# Patient Record
Sex: FEMALE | Race: BLACK OR AFRICAN AMERICAN | NOT HISPANIC OR LATINO | Employment: FULL TIME | URBAN - METROPOLITAN AREA
[De-identification: names, ages, dates, MRNs, and addresses within clinical notes are randomized per-mention and may not be internally consistent; named-entity substitution may affect disease eponyms.]

---

## 2019-12-13 ENCOUNTER — HISTORICAL (OUTPATIENT)
Dept: FAMILY MEDICINE | Facility: CLINIC | Age: 49
End: 2019-12-13

## 2019-12-13 LAB
ABS NEUT (OLG): 2.95 X10(3)/MCL (ref 2.1–9.2)
ALBUMIN SERPL-MCNC: 3.5 GM/DL (ref 3.4–5)
ALBUMIN/GLOB SERPL: 1 RATIO (ref 1.1–2)
ALP SERPL-CCNC: 78 UNIT/L (ref 45–117)
ALT SERPL-CCNC: 17 UNIT/L (ref 12–78)
APPEARANCE, UA: ABNORMAL
AST SERPL-CCNC: 11 UNIT/L (ref 15–37)
BACTERIA #/AREA URNS AUTO: ABNORMAL /HPF
BASOPHILS # BLD AUTO: 0 X10(3)/MCL (ref 0–0.2)
BASOPHILS NFR BLD AUTO: 1 %
BILIRUB SERPL-MCNC: 0.4 MG/DL (ref 0.2–1)
BILIRUB UR QL STRIP: NEGATIVE
BILIRUBIN DIRECT+TOT PNL SERPL-MCNC: <0.1 MG/DL (ref 0–0.2)
BILIRUBIN DIRECT+TOT PNL SERPL-MCNC: >0.3 MG/DL
BUN SERPL-MCNC: 43 MG/DL (ref 7–18)
CALCIUM SERPL-MCNC: 8.5 MG/DL (ref 8.5–10.1)
CHLORIDE SERPL-SCNC: 106 MMOL/L (ref 98–107)
CHOLEST SERPL-MCNC: 150 MG/DL
CHOLEST/HDLC SERPL: 2.8 {RATIO} (ref 0–4.4)
CO2 SERPL-SCNC: 30 MMOL/L (ref 21–32)
COLOR UR: ABNORMAL
CREAT SERPL-MCNC: 1.9 MG/DL (ref 0.6–1.3)
EOSINOPHIL # BLD AUTO: 0.3 X10(3)/MCL (ref 0–0.9)
EOSINOPHIL NFR BLD AUTO: 6 %
ERYTHROCYTE [DISTWIDTH] IN BLOOD BY AUTOMATED COUNT: 13 % (ref 11.5–14.5)
EST. AVERAGE GLUCOSE BLD GHB EST-MCNC: 120 MG/DL
GLOBULIN SER-MCNC: 3.5 GM/ML (ref 2.3–3.5)
GLUCOSE (UA): NEGATIVE
GLUCOSE SERPL-MCNC: 84 MG/DL (ref 74–106)
HBA1C MFR BLD: 5.8 % (ref 4.2–6.3)
HCT VFR BLD AUTO: 42 % (ref 35–46)
HDLC SERPL-MCNC: 53 MG/DL (ref 40–59)
HGB BLD-MCNC: 13 GM/DL (ref 12–16)
HGB UR QL STRIP: NEGATIVE
HYALINE CASTS #/AREA URNS LPF: ABNORMAL /LPF
IMM GRANULOCYTES # BLD AUTO: 0.02 10*3/UL
IMM GRANULOCYTES NFR BLD AUTO: 0 %
KETONES UR QL STRIP: NEGATIVE
LDLC SERPL CALC-MCNC: 77 MG/DL
LEUKOCYTE ESTERASE UR QL STRIP: 25 LEU/UL
LYMPHOCYTES # BLD AUTO: 0.9 X10(3)/MCL (ref 0.6–4.6)
LYMPHOCYTES NFR BLD AUTO: 18 %
MCH RBC QN AUTO: 26.9 PG (ref 26–34)
MCHC RBC AUTO-ENTMCNC: 31 GM/DL (ref 31–37)
MCV RBC AUTO: 86.8 FL (ref 80–100)
MONOCYTES # BLD AUTO: 0.6 X10(3)/MCL (ref 0.1–1.3)
MONOCYTES NFR BLD AUTO: 12 %
NEUTROPHILS # BLD AUTO: 2.95 X10(3)/MCL (ref 2.1–9.2)
NEUTROPHILS NFR BLD AUTO: 62 %
NITRITE UR QL STRIP: NEGATIVE
PH UR STRIP: 6.5 [PH] (ref 4.5–8)
PLATELET # BLD AUTO: 265 X10(3)/MCL (ref 130–400)
PMV BLD AUTO: 10.2 FL (ref 7.4–10.4)
POTASSIUM SERPL-SCNC: 4 MMOL/L (ref 3.5–5.1)
PROT SERPL-MCNC: 7 GM/DL (ref 6.4–8.2)
PROT UR QL STRIP: 30 MG/DL
RBC # BLD AUTO: 4.84 X10(6)/MCL (ref 4–5.2)
RBC #/AREA URNS AUTO: ABNORMAL /HPF
SODIUM SERPL-SCNC: 140 MMOL/L (ref 136–145)
SP GR UR STRIP: 1.01 (ref 1–1.03)
SQUAMOUS #/AREA URNS LPF: >100 /LPF
T4 FREE SERPL-MCNC: 1.13 NG/DL (ref 0.76–1.46)
TRIGL SERPL-MCNC: 99 MG/DL
TSH SERPL-ACNC: 2.73 MIU/L (ref 0.36–3.74)
UROBILINOGEN UR STRIP-ACNC: NORMAL
VLDLC SERPL CALC-MCNC: 20 MG/DL
WBC # SPEC AUTO: 4.8 X10(3)/MCL (ref 4.5–11)
WBC #/AREA URNS AUTO: ABNORMAL /HPF

## 2020-01-14 ENCOUNTER — HISTORICAL (OUTPATIENT)
Dept: RADIOLOGY | Facility: HOSPITAL | Age: 50
End: 2020-01-14

## 2020-01-14 LAB
ALBUMIN SERPL-MCNC: 3.6 GM/DL (ref 3.4–5)
ALBUMIN/GLOB SERPL: 1 RATIO (ref 1.1–2)
ALP SERPL-CCNC: 100 UNIT/L (ref 45–117)
ALT SERPL-CCNC: 16 UNIT/L (ref 12–78)
APPEARANCE, UA: CLEAR
AST SERPL-CCNC: 12 UNIT/L (ref 15–37)
BACTERIA #/AREA URNS AUTO: ABNORMAL /HPF
BILIRUB SERPL-MCNC: 0.3 MG/DL (ref 0.2–1)
BILIRUB UR QL STRIP: NEGATIVE
BILIRUBIN DIRECT+TOT PNL SERPL-MCNC: <0.1 MG/DL (ref 0–0.2)
BILIRUBIN DIRECT+TOT PNL SERPL-MCNC: ABNORMAL MG/DL
BUN SERPL-MCNC: 31 MG/DL (ref 7–18)
CALCIUM SERPL-MCNC: 8.8 MG/DL (ref 8.5–10.1)
CHLORIDE SERPL-SCNC: 106 MMOL/L (ref 98–107)
CO2 SERPL-SCNC: 31 MMOL/L (ref 21–32)
COLOR UR: ABNORMAL
CREAT SERPL-MCNC: 1.7 MG/DL (ref 0.6–1.3)
CREAT UR-MCNC: 80 MG/DL
GLOBULIN SER-MCNC: 3.6 GM/ML (ref 2.3–3.5)
GLUCOSE (UA): NEGATIVE
GLUCOSE SERPL-MCNC: 82 MG/DL (ref 74–106)
HGB UR QL STRIP: 0.03 MG/DL
HYALINE CASTS #/AREA URNS LPF: ABNORMAL /LPF
KETONES UR QL STRIP: NEGATIVE
LEUKOCYTE ESTERASE UR QL STRIP: NEGATIVE
NITRITE UR QL STRIP: NEGATIVE
PH UR STRIP: 5.5 [PH] (ref 4.5–8)
POTASSIUM SERPL-SCNC: 4.6 MMOL/L (ref 3.5–5.1)
PROT SERPL-MCNC: 7.2 GM/DL (ref 6.4–8.2)
PROT UR QL STRIP: 70 MG/DL
PROT UR STRIP-MCNC: 74.7 MG/DL
PROT/CREAT UR-RTO: 933.8 MG/GM
RBC #/AREA URNS AUTO: ABNORMAL /HPF
SODIUM SERPL-SCNC: 139 MMOL/L (ref 136–145)
SP GR UR STRIP: 1.01 (ref 1–1.03)
SQUAMOUS #/AREA URNS LPF: ABNORMAL /LPF
URATE SERPL-MCNC: 7.6 MG/DL (ref 2.6–6)
UROBILINOGEN UR STRIP-ACNC: NORMAL
WBC #/AREA URNS AUTO: ABNORMAL /HPF

## 2020-03-03 ENCOUNTER — HISTORICAL (OUTPATIENT)
Dept: RADIOLOGY | Facility: HOSPITAL | Age: 50
End: 2020-03-03

## 2020-06-04 ENCOUNTER — HISTORICAL (OUTPATIENT)
Dept: LAB | Facility: HOSPITAL | Age: 50
End: 2020-06-04

## 2020-06-04 LAB
ABS NEUT (OLG): 2.69 X10(3)/MCL (ref 2.1–9.2)
ALBUMIN SERPL-MCNC: 3.8 GM/DL (ref 3.4–5)
ALBUMIN/GLOB SERPL: 1 RATIO (ref 1.1–2)
ALP SERPL-CCNC: 99 UNIT/L (ref 45–117)
ALT SERPL-CCNC: 17 UNIT/L (ref 12–78)
APPEARANCE, UA: CLEAR
AST SERPL-CCNC: 13 UNIT/L (ref 15–37)
BACTERIA #/AREA URNS AUTO: ABNORMAL /HPF
BASOPHILS # BLD AUTO: 0 X10(3)/MCL (ref 0–0.2)
BASOPHILS NFR BLD AUTO: 1 %
BILIRUB SERPL-MCNC: 0.3 MG/DL (ref 0.2–1)
BILIRUB UR QL STRIP: NEGATIVE
BILIRUBIN DIRECT+TOT PNL SERPL-MCNC: <0.1 MG/DL (ref 0–0.2)
BILIRUBIN DIRECT+TOT PNL SERPL-MCNC: ABNORMAL MG/DL
BUN SERPL-MCNC: 32 MG/DL (ref 7–18)
CALCIUM SERPL-MCNC: 9.2 MG/DL (ref 8.5–10.1)
CHLORIDE SERPL-SCNC: 110 MMOL/L (ref 98–107)
CHOLEST SERPL-MCNC: 200 MG/DL
CHOLEST/HDLC SERPL: 3.7 {RATIO} (ref 0–4.4)
CO2 SERPL-SCNC: 28 MMOL/L (ref 21–32)
COLOR UR: ABNORMAL
CREAT SERPL-MCNC: 1.7 MG/DL (ref 0.6–1.3)
CREAT UR-MCNC: 96 MG/DL
EOSINOPHIL # BLD AUTO: 0.2 X10(3)/MCL (ref 0–0.9)
EOSINOPHIL NFR BLD AUTO: 5 %
ERYTHROCYTE [DISTWIDTH] IN BLOOD BY AUTOMATED COUNT: 13 % (ref 11.5–14.5)
EST. AVERAGE GLUCOSE BLD GHB EST-MCNC: 100 MG/DL
GLOBULIN SER-MCNC: 4 GM/ML (ref 2.3–3.5)
GLUCOSE (UA): NEGATIVE
GLUCOSE SERPL-MCNC: 78 MG/DL (ref 74–106)
HAV IGM SERPL QL IA: NONREACTIVE
HBA1C MFR BLD: 5.1 % (ref 4.2–6.3)
HBV CORE IGM SERPL QL IA: NONREACTIVE
HBV SURFACE AG SERPL QL IA: NONREACTIVE
HCT VFR BLD AUTO: 44.2 % (ref 35–46)
HCV AB SERPL QL IA: NONREACTIVE
HDLC SERPL-MCNC: 54 MG/DL (ref 40–59)
HGB BLD-MCNC: 13.8 GM/DL (ref 12–16)
HGB UR QL STRIP: 0.06 MG/DL
HIV 1+2 AB+HIV1 P24 AG SERPL QL IA: NONREACTIVE
HYALINE CASTS #/AREA URNS LPF: ABNORMAL /LPF
IMM GRANULOCYTES # BLD AUTO: 0.01 10*3/UL
IMM GRANULOCYTES NFR BLD AUTO: 0 %
KETONES UR QL STRIP: NEGATIVE
LDLC SERPL CALC-MCNC: 113 MG/DL
LEUKOCYTE ESTERASE UR QL STRIP: NEGATIVE
LYMPHOCYTES # BLD AUTO: 1.1 X10(3)/MCL (ref 0.6–4.6)
LYMPHOCYTES NFR BLD AUTO: 24 %
MCH RBC QN AUTO: 27.1 PG (ref 26–34)
MCHC RBC AUTO-ENTMCNC: 31.2 GM/DL (ref 31–37)
MCV RBC AUTO: 86.7 FL (ref 80–100)
MONOCYTES # BLD AUTO: 0.4 X10(3)/MCL (ref 0.1–1.3)
MONOCYTES NFR BLD AUTO: 9 %
NEUTROPHILS # BLD AUTO: 2.69 X10(3)/MCL (ref 2.1–9.2)
NEUTROPHILS NFR BLD AUTO: 60 %
NITRITE UR QL STRIP: NEGATIVE
PH UR STRIP: 6 [PH] (ref 4.5–8)
PLATELET # BLD AUTO: 287 X10(3)/MCL (ref 130–400)
PMV BLD AUTO: 11.1 FL (ref 7.4–10.4)
POTASSIUM SERPL-SCNC: 4.4 MMOL/L (ref 3.5–5.1)
PROT SERPL-MCNC: 7.8 GM/DL (ref 6.4–8.2)
PROT UR QL STRIP: 200 MG/DL
PROT UR STRIP-MCNC: 185.2 MG/DL
PROT/CREAT UR-RTO: 1929.2 MG/GM
RBC # BLD AUTO: 5.1 X10(6)/MCL (ref 4–5.2)
RBC #/AREA URNS AUTO: ABNORMAL /HPF
SERINE PROT 3-ARUP: 6 AU/ML
SODIUM SERPL-SCNC: 142 MMOL/L (ref 136–145)
SP GR UR STRIP: 1.01 (ref 1–1.03)
SQUAMOUS #/AREA URNS LPF: ABNORMAL /LPF
TRIGL SERPL-MCNC: 164 MG/DL
URATE SERPL-MCNC: 6.6 MG/DL (ref 2.6–6)
UROBILINOGEN UR STRIP-ACNC: NORMAL
VLDLC SERPL CALC-MCNC: 33 MG/DL
WBC # SPEC AUTO: 4.4 X10(3)/MCL (ref 4.5–11)
WBC #/AREA URNS AUTO: ABNORMAL /HPF

## 2020-06-07 LAB — FINAL CULTURE: NORMAL

## 2020-08-06 ENCOUNTER — HISTORICAL (OUTPATIENT)
Dept: ADMINISTRATIVE | Facility: HOSPITAL | Age: 50
End: 2020-08-06

## 2020-08-06 LAB
ABS NEUT (OLG): 2.15 X10(3)/MCL (ref 2.1–9.2)
ALBUMIN SERPL-MCNC: 3.9 GM/DL (ref 3.4–5)
ALBUMIN/GLOB SERPL: 1 RATIO (ref 1.1–2)
ALP SERPL-CCNC: 96 UNIT/L (ref 45–117)
ALT SERPL-CCNC: 21 UNIT/L (ref 12–78)
AST SERPL-CCNC: 18 UNIT/L (ref 15–37)
BASOPHILS # BLD AUTO: 0 X10(3)/MCL (ref 0–0.2)
BASOPHILS NFR BLD AUTO: 1 %
BILIRUB SERPL-MCNC: 0.4 MG/DL (ref 0.2–1)
BILIRUBIN DIRECT+TOT PNL SERPL-MCNC: 0.1 MG/DL (ref 0–0.2)
BILIRUBIN DIRECT+TOT PNL SERPL-MCNC: 0.3 MG/DL
BUN SERPL-MCNC: 41 MG/DL (ref 7–18)
CALCIUM SERPL-MCNC: 9.4 MG/DL (ref 8.5–10.1)
CHLORIDE SERPL-SCNC: 111 MMOL/L (ref 98–107)
CO2 SERPL-SCNC: 27 MMOL/L (ref 21–32)
CREAT SERPL-MCNC: 2.1 MG/DL (ref 0.6–1.3)
CRP SERPL-MCNC: <0.3 MG/DL
EOSINOPHIL # BLD AUTO: 0.2 X10(3)/MCL (ref 0–0.9)
EOSINOPHIL NFR BLD AUTO: 6 %
ERYTHROCYTE [DISTWIDTH] IN BLOOD BY AUTOMATED COUNT: 13.1 % (ref 11.5–14.5)
ERYTHROCYTE [SEDIMENTATION RATE] IN BLOOD: 7 MM/HR (ref 0–20)
GLOBULIN SER-MCNC: 3.9 GM/ML (ref 2.3–3.5)
GLUCOSE SERPL-MCNC: 85 MG/DL (ref 74–106)
HCT VFR BLD AUTO: 43.7 % (ref 35–46)
HGB BLD-MCNC: 13.7 GM/DL (ref 12–16)
IMM GRANULOCYTES # BLD AUTO: 0.01 10*3/UL
IMM GRANULOCYTES NFR BLD AUTO: 0 %
LYMPHOCYTES # BLD AUTO: 1.1 X10(3)/MCL (ref 0.6–4.6)
LYMPHOCYTES NFR BLD AUTO: 28 %
MCH RBC QN AUTO: 27.2 PG (ref 26–34)
MCHC RBC AUTO-ENTMCNC: 31.4 GM/DL (ref 31–37)
MCV RBC AUTO: 86.7 FL (ref 80–100)
MONOCYTES # BLD AUTO: 0.3 X10(3)/MCL (ref 0.1–1.3)
MONOCYTES NFR BLD AUTO: 8 %
NEUTROPHILS # BLD AUTO: 2.15 X10(3)/MCL (ref 2.1–9.2)
NEUTROPHILS NFR BLD AUTO: 56 %
PLATELET # BLD AUTO: 244 X10(3)/MCL (ref 130–400)
PMV BLD AUTO: 10.7 FL (ref 7.4–10.4)
POTASSIUM SERPL-SCNC: 4.9 MMOL/L (ref 3.5–5.1)
PROT SERPL-MCNC: 7.8 GM/DL (ref 6.4–8.2)
RBC # BLD AUTO: 5.04 X10(6)/MCL (ref 4–5.2)
SODIUM SERPL-SCNC: 142 MMOL/L (ref 136–145)
TSH SERPL-ACNC: 3.53 MIU/L (ref 0.36–3.74)
URATE SERPL-MCNC: 5.3 MG/DL (ref 2.6–6)
WBC # SPEC AUTO: 3.8 X10(3)/MCL (ref 4.5–11)

## 2020-08-12 ENCOUNTER — HISTORICAL (OUTPATIENT)
Dept: RADIOLOGY | Facility: HOSPITAL | Age: 50
End: 2020-08-12

## 2020-08-25 ENCOUNTER — HISTORICAL (OUTPATIENT)
Dept: ADMINISTRATIVE | Facility: HOSPITAL | Age: 50
End: 2020-08-25

## 2020-08-25 LAB
ALBUMIN SERPL-MCNC: 4.1 GM/DL (ref 3.4–5)
ALBUMIN/GLOB SERPL: 1.1 RATIO (ref 1.1–2)
ALP SERPL-CCNC: 105 UNIT/L (ref 45–117)
ALT SERPL-CCNC: 20 UNIT/L (ref 12–78)
AST SERPL-CCNC: 16 UNIT/L (ref 15–37)
BILIRUB SERPL-MCNC: 0.4 MG/DL (ref 0.2–1)
BILIRUBIN DIRECT+TOT PNL SERPL-MCNC: 0.2 MG/DL
BILIRUBIN DIRECT+TOT PNL SERPL-MCNC: 0.2 MG/DL (ref 0–0.2)
BUN SERPL-MCNC: 24 MG/DL (ref 7–18)
CALCIUM SERPL-MCNC: 9.5 MG/DL (ref 8.5–10.1)
CHLORIDE SERPL-SCNC: 109 MMOL/L (ref 98–107)
CO2 SERPL-SCNC: 28 MMOL/L (ref 21–32)
CREAT SERPL-MCNC: 1.8 MG/DL (ref 0.6–1.3)
GLOBULIN SER-MCNC: 3.9 GM/ML (ref 2.3–3.5)
GLUCOSE SERPL-MCNC: 83 MG/DL (ref 74–106)
POTASSIUM SERPL-SCNC: 4.5 MMOL/L (ref 3.5–5.1)
PROT SERPL-MCNC: 8 GM/DL (ref 6.4–8.2)
SODIUM SERPL-SCNC: 143 MMOL/L (ref 136–145)
URATE SERPL-MCNC: 7.3 MG/DL (ref 2.6–6)

## 2020-10-19 ENCOUNTER — HISTORICAL (OUTPATIENT)
Dept: FAMILY MEDICINE | Facility: CLINIC | Age: 50
End: 2020-10-19

## 2020-10-19 LAB
CHOLEST SERPL-MCNC: 160 MG/DL
CHOLEST/HDLC SERPL: 3.2 {RATIO} (ref 0–4.4)
HDLC SERPL-MCNC: 50 MG/DL (ref 40–59)
LDLC SERPL CALC-MCNC: 87 MG/DL
TRIGL SERPL-MCNC: 117 MG/DL
VLDLC SERPL CALC-MCNC: 23 MG/DL

## 2020-12-28 ENCOUNTER — HISTORICAL (OUTPATIENT)
Dept: ADMINISTRATIVE | Facility: HOSPITAL | Age: 50
End: 2020-12-28

## 2020-12-28 LAB
ABS NEUT (OLG): 1.92 X10(3)/MCL (ref 2.1–9.2)
ALBUMIN SERPL-MCNC: 4 GM/DL (ref 3.5–5)
ALBUMIN/GLOB SERPL: 1.2 RATIO (ref 1.1–2)
ALP SERPL-CCNC: 87 UNIT/L (ref 40–150)
ALT SERPL-CCNC: 14 UNIT/L (ref 0–55)
APPEARANCE, UA: CLEAR
AST SERPL-CCNC: 15 UNIT/L (ref 5–34)
BACTERIA #/AREA URNS AUTO: ABNORMAL /HPF
BASOPHILS # BLD AUTO: 0 X10(3)/MCL (ref 0–0.2)
BASOPHILS NFR BLD AUTO: 1 %
BILIRUB SERPL-MCNC: 0.3 MG/DL
BILIRUB UR QL STRIP: NEGATIVE
BILIRUBIN DIRECT+TOT PNL SERPL-MCNC: 0.1 MG/DL (ref 0–0.5)
BILIRUBIN DIRECT+TOT PNL SERPL-MCNC: 0.2 MG/DL (ref 0–0.8)
BUN SERPL-MCNC: 35 MG/DL (ref 9.8–20.1)
CALCIUM SERPL-MCNC: 9.4 MG/DL (ref 8.4–10.2)
CHLORIDE SERPL-SCNC: 106 MMOL/L (ref 98–107)
CO2 SERPL-SCNC: 27 MMOL/L (ref 22–29)
COLOR UR: ABNORMAL
CREAT SERPL-MCNC: 1.89 MG/DL (ref 0.55–1.02)
CREAT UR-MCNC: 103.8 MG/DL (ref 45–106)
DEPRECATED CALCIDIOL+CALCIFEROL SERPL-MC: 54.1 NG/ML (ref 30–80)
EOSINOPHIL # BLD AUTO: 0.2 X10(3)/MCL (ref 0–0.9)
EOSINOPHIL NFR BLD AUTO: 7 %
ERYTHROCYTE [DISTWIDTH] IN BLOOD BY AUTOMATED COUNT: 12.6 % (ref 11.5–14.5)
EST CREAT CLEARANCE SER (OHS): 52.76 ML/MIN
GLOBULIN SER-MCNC: 3.3 GM/DL (ref 2.4–3.5)
GLUCOSE (UA): NEGATIVE
GLUCOSE SERPL-MCNC: 81 MG/DL (ref 74–100)
HCT VFR BLD AUTO: 44.2 % (ref 35–46)
HGB BLD-MCNC: 13.7 GM/DL (ref 12–16)
HGB UR QL STRIP: 0.03 MG/DL
HYALINE CASTS #/AREA URNS LPF: ABNORMAL /LPF
KETONES UR QL STRIP: NEGATIVE
LEUKOCYTE ESTERASE UR QL STRIP: NEGATIVE
LYMPHOCYTES # BLD AUTO: 1 X10(3)/MCL (ref 0.6–4.6)
LYMPHOCYTES NFR BLD AUTO: 28 %
MAGNESIUM SERPL-MCNC: 2.35 MG/DL (ref 1.6–2.6)
MCH RBC QN AUTO: 26.9 PG (ref 26–34)
MCHC RBC AUTO-ENTMCNC: 31 GM/DL (ref 31–37)
MCV RBC AUTO: 86.7 FL (ref 80–100)
MONOCYTES # BLD AUTO: 0.4 X10(3)/MCL (ref 0.1–1.3)
MONOCYTES NFR BLD AUTO: 12 %
NEUTROPHILS # BLD AUTO: 1.92 X10(3)/MCL (ref 2.1–9.2)
NEUTROPHILS NFR BLD AUTO: 52 %
NITRITE UR QL STRIP: NEGATIVE
PH UR STRIP: 5.5 [PH] (ref 4.5–8)
PHOSPHATE SERPL-MCNC: 2.9 MG/DL (ref 2.3–4.7)
PLATELET # BLD AUTO: 297 X10(3)/MCL (ref 130–400)
PMV BLD AUTO: 11.4 FL (ref 7.4–10.4)
POTASSIUM SERPL-SCNC: 4 MMOL/L (ref 3.5–5.1)
PROT SERPL-MCNC: 7.3 GM/DL (ref 6.4–8.3)
PROT UR QL STRIP: 100 MG/DL
PROT UR STRIP-MCNC: 96.9 MG/DL
PROT/CREAT UR-RTO: 933.5 MG/GM
PTH-INTACT SERPL-MCNC: 117.1 PG/ML (ref 18.4–80.1)
RBC # BLD AUTO: 5.1 X10(6)/MCL (ref 4–5.2)
RBC #/AREA URNS AUTO: ABNORMAL /HPF
SODIUM SERPL-SCNC: 143 MMOL/L (ref 136–145)
SP GR UR STRIP: 1.01 (ref 1–1.03)
SQUAMOUS #/AREA URNS LPF: ABNORMAL /LPF
URATE SERPL-MCNC: 9.5 MG/DL (ref 2.6–6)
UROBILINOGEN UR STRIP-ACNC: NORMAL
WBC # SPEC AUTO: 3.7 X10(3)/MCL (ref 4.5–11)
WBC #/AREA URNS AUTO: ABNORMAL /HPF

## 2021-06-16 ENCOUNTER — HISTORICAL (OUTPATIENT)
Dept: FAMILY MEDICINE | Facility: CLINIC | Age: 51
End: 2021-06-16

## 2021-06-16 LAB
ABS NEUT (OLG): 2.75 X10(3)/MCL (ref 2.1–9.2)
ALBUMIN SERPL-MCNC: 3.9 GM/DL (ref 3.5–5)
ALBUMIN/GLOB SERPL: 1.3 RATIO (ref 1.1–2)
ALP SERPL-CCNC: 101 UNIT/L (ref 40–150)
ALT SERPL-CCNC: 10 UNIT/L (ref 0–55)
APPEARANCE, UA: CLEAR
AST SERPL-CCNC: 16 UNIT/L (ref 5–34)
BACTERIA #/AREA URNS AUTO: ABNORMAL /HPF
BASOPHILS # BLD AUTO: 0 X10(3)/MCL (ref 0–0.2)
BASOPHILS NFR BLD AUTO: 1 %
BILIRUB SERPL-MCNC: 0.3 MG/DL
BILIRUB UR QL STRIP: NEGATIVE
BILIRUBIN DIRECT+TOT PNL SERPL-MCNC: 0.1 MG/DL (ref 0–0.5)
BILIRUBIN DIRECT+TOT PNL SERPL-MCNC: 0.2 MG/DL (ref 0–0.8)
BUN SERPL-MCNC: 27.4 MG/DL (ref 9.8–20.1)
CALCIUM SERPL-MCNC: 9.6 MG/DL (ref 8.4–10.2)
CHLORIDE SERPL-SCNC: 111 MMOL/L (ref 98–107)
CHOLEST SERPL-MCNC: 149 MG/DL
CHOLEST/HDLC SERPL: 4 {RATIO} (ref 0–5)
CO2 SERPL-SCNC: 26 MMOL/L (ref 22–29)
COLOR UR: ABNORMAL
CREAT SERPL-MCNC: 1.61 MG/DL (ref 0.55–1.02)
CREAT UR-MCNC: 65.7 MG/DL (ref 45–106)
EOSINOPHIL # BLD AUTO: 0.4 X10(3)/MCL (ref 0–0.9)
EOSINOPHIL NFR BLD AUTO: 8 %
ERYTHROCYTE [DISTWIDTH] IN BLOOD BY AUTOMATED COUNT: 14 % (ref 11.5–14.5)
GLOBULIN SER-MCNC: 3 GM/DL (ref 2.4–3.5)
GLUCOSE (UA): NEGATIVE
GLUCOSE SERPL-MCNC: 88 MG/DL (ref 74–100)
HCT VFR BLD AUTO: 41.2 % (ref 35–46)
HDLC SERPL-MCNC: 34 MG/DL (ref 35–60)
HGB BLD-MCNC: 12.9 GM/DL (ref 12–16)
HGB UR QL STRIP: NEGATIVE
HYALINE CASTS #/AREA URNS LPF: ABNORMAL /LPF
IMM GRANULOCYTES # BLD AUTO: 0.01 10*3/UL
IMM GRANULOCYTES NFR BLD AUTO: 0 %
KETONES UR QL STRIP: NEGATIVE
LDLC SERPL CALC-MCNC: 93 MG/DL (ref 50–140)
LEUKOCYTE ESTERASE UR QL STRIP: NEGATIVE
LYMPHOCYTES # BLD AUTO: 1 X10(3)/MCL (ref 0.6–4.6)
LYMPHOCYTES NFR BLD AUTO: 22 %
MCH RBC QN AUTO: 27.3 PG (ref 26–34)
MCHC RBC AUTO-ENTMCNC: 31.3 GM/DL (ref 31–37)
MCV RBC AUTO: 87.1 FL (ref 80–100)
MONOCYTES # BLD AUTO: 0.4 X10(3)/MCL (ref 0.1–1.3)
MONOCYTES NFR BLD AUTO: 10 %
NEUTROPHILS # BLD AUTO: 2.75 X10(3)/MCL (ref 2.1–9.2)
NEUTROPHILS NFR BLD AUTO: 59 %
NITRITE UR QL STRIP: NEGATIVE
NRBC BLD AUTO-RTO: 0 % (ref 0–0.2)
PH UR STRIP: 5.5 [PH] (ref 4.5–8)
PLATELET # BLD AUTO: 254 X10(3)/MCL (ref 130–400)
PMV BLD AUTO: 11.3 FL (ref 7.4–10.4)
POTASSIUM SERPL-SCNC: 4.4 MMOL/L (ref 3.5–5.1)
PROT SERPL-MCNC: 6.9 GM/DL (ref 6.4–8.3)
PROT UR QL STRIP: 30 MG/DL
PROT UR STRIP-MCNC: 49 MG/DL
PROT/CREAT UR-RTO: 745.8 MG/GM CR
RBC # BLD AUTO: 4.73 X10(6)/MCL (ref 4–5.2)
RBC #/AREA URNS AUTO: ABNORMAL /HPF
SODIUM SERPL-SCNC: 142 MMOL/L (ref 136–145)
SP GR UR STRIP: 1.01 (ref 1–1.03)
SQUAMOUS #/AREA URNS LPF: ABNORMAL /LPF
TRIGL SERPL-MCNC: 111 MG/DL (ref 37–140)
TSH SERPL-ACNC: 2.53 UIU/ML (ref 0.35–4.94)
UROBILINOGEN UR STRIP-ACNC: NORMAL
VLDLC SERPL CALC-MCNC: 22 MG/DL
WBC # SPEC AUTO: 4.6 X10(3)/MCL (ref 4.5–11)
WBC #/AREA URNS AUTO: ABNORMAL /HPF

## 2021-12-15 ENCOUNTER — HISTORICAL (OUTPATIENT)
Dept: RADIOLOGY | Facility: HOSPITAL | Age: 51
End: 2021-12-15

## 2022-04-10 ENCOUNTER — HISTORICAL (OUTPATIENT)
Dept: ADMINISTRATIVE | Facility: HOSPITAL | Age: 52
End: 2022-04-10
Payer: COMMERCIAL

## 2022-04-26 VITALS
BODY MASS INDEX: 29.98 KG/M2 | HEIGHT: 69 IN | SYSTOLIC BLOOD PRESSURE: 145 MMHG | OXYGEN SATURATION: 99 % | WEIGHT: 202.38 LBS | DIASTOLIC BLOOD PRESSURE: 88 MMHG

## 2022-05-03 NOTE — HISTORICAL [facility] CERNER
This is a historical note converted from Louise. Formatting and pictures may have been removed.  Please reference Louise for original formatting and attached multimedia. Chief Complaint  HTN, CKD, MIN. PLANTAR FASCIITIS, MUSCLE STRAIN  History of Present Illness  Pt is a 50?year old Female?with a? past medical history of HTN, CKD 3, ?new onset cardiac murmur (12/12/19), and LBBB. Pt state plantar fascitis has improved with exercises, ice therapy, medrol dose pack?and Tylenol and hand pain has also improved with pain level 3/10. Pt states she has little tingling in the left shoulder scapular area but overall much better. Discussed labs from 8/6/2020 with the patient. ?Patient has been referred to more physical therapy for plantar fasciitis and hand?pain?and she has been referred to podiatrist?Dr. Victoria?for plantar fasciitis.Pt had MRI today for left sided weakness. MRI results pending. Pt states she no longer has the left sided weakness.  ?   HTN: Adheres to low sodium diet and medications-? diltiazem ER?180 mg po BID and losartan 50 mg po BID? BP is elevated on last visit and today. Renal had tried to discontinue clonidine abruptly in 6/2020 for hydralazine but pt states this was unsuccessful (see CKD3 below). ?Patient states?since?this episode, her blood pressure has?consistently been elevated. CERNER Message sent to MD on 8/24/2020 by [facility] Nephrology NP Bhavna stating she is starting the patient on amlodipine 5 mg and? furosemide 20 mg po daily with the discontinuation of triamterene which was stolen along with all of patients other medications and is expensive.? Pt has not taken either of the medications nor has she taken the triamterene.?She is tapering off clonidine 0.1 mg taking 1/2 tab po BID and hydralazine 10 mg po BID on week 2 of clonidine taper. BP ranges  -136/ 72 -84;?  -145/ 76-90 ( outlier  and ), and bedtime? 111-134/ 71-82.??She denies?blurred vision, headache,?chest pain,  shortness of breath, dyspnea on exertion, PND, peripheral edema?or orthopnea. Pt was advised to continue the taper and start furosemide because of peripheral edema until notified by [facility] LPN about the use of amlodipine. Message was sent to [facility] Nephrology NP Bhavna during the visit with options to start furosemide and continue the taper with titration on of hydralazine (if needed). Awaiting a response.  ?  Cardiac murmur: Pt is asymptomatic. ?Reviewed and discussed with patient the?results of her SANDIE?with left ventricular ejection fraction of 50%. ?Referral for [facility]?is in pending acceptance?status.?  ?   CKD 3:??GFR (8/6/2020): 32 with a BUN/creatinine 41/2.0. Reviewed note nephrology note from 6/5/2020: Nephrology tried to discontinue clonidine as patient was not tolerating therapy well and hydralazine 25 mg by mouth twice a day was added to control hypertension because patients chronic kidney disease stage III is due to hypertensive nephropathy.? Patient states this was unsuccessful.? Clonidine had been stopped abruptly per patient and hydralazine added which produced severe headaches and after 3 days of headaches, the clonidine was restarted and hydralazine was discontinued.  ?  ?   Pt starts PT for planar fascitis?on tomorrow. Tylenol prn pain . Pt has purchased new shoes with arch support. She is also doing stretching exercises and Ice bottle exercises to alleviate plantar fascitis.  ?   Patient denies chest pain, shortness of breath,?dyspnea on exertion, palpitations, peripheral edema,?abdominal pain, nausea, vomiting,?diarrhea,?constipation, fatigue, fever, chills,?dysuria,? hematuria, melena,?or hematochezia.  ?  ?  Review of Systems  GENERAL:?Negative for abnormal weight loss or weight gain, fatigue, fever, or chills. Negative except for stated in HPI.  HEENT:??Negative for head trauma,?blurred vision, rhinorrhea, ?nasal congestion, sore throat or hearing deficit. Negative except for stated in  HPI.  CARDIOVASCULAR: Negative for? chest pain, palpitations, VILLASENOR, PND, orthopnea, peripheral edema or syncope. Negative except for stated in HPI.  RESPIRATORY: Negative for SOB, VILLASENOR, cough or?wheezing.? Negative except for stated in HPI.  GASTROINTESTINAL: Negative for abdominal pain, nausea,??vomiting, diarrhea, constipation, heartburn, ?hematochezia? or melena.? Negative except for stated in HPI.  GENITOURINARY: Negative for dysuria, hematuria, urinary frequency, urinary urgency, urinary hesitancy or flank pain. Negative except for stated in HPI.  ENDOCRINE: Negative for fatigue, heat intolerance, cold intolerance, polyuria, polydipsia, or polyphagia.? Negative except for stated in HPI.  PSYCHOLOGICAL: Negative for depression, anxiety, suicidal or homicidal ideations, hallucinations??or?florin.? Negative except for?stated in HPI.  MUSCULOSKELETAL: .? Negative except for?stated in HPI.  INTEGUMENT: Negative for rash?or lesions. Negative except for stated in HPI.  NEUROLOGY: Negative for headaches, dizziness,??vertigo?or gait disturbances. Negative except for stated in HPI.  ?  ?  Physical Exam  Vitals & Measurements  T:?36.7? ?C (Oral)? HR:?71(Peripheral)? RR:?18? BP:?132/84? SpO2:?96%?  HT:?175.00?cm? WT:?93.800?kg? BMI:?30.63?  GENERAL:?Alert and oriented to person, place, time and situation,?NAD  HEENT: NCAT, Pupils equally round and reactive to light accommodation,?normal sclera, ?moist mucous membranes,?oropharynx normal,?normal nasal turbinates/ nares,??TM clear bilaterally, neck?supple,?thyroid normal,?no lymphadenopathy.  CARDIOVASCULAR:?Regular rateand?rhythm,? S1 and S2 normal;?no murmurs, no rubs, or no gallops; no carotid bruit.  RESPIRATORY:??Lungs clear to auscultation bilaterally;?no wheezes,?no rhonchi,?or? no crackles  ABDOMEN: Soft/ Nontender/ Nondistended; Bowel Sounds x4 - normoactive; no abdominal pulsatile mass, no masses, no hernias  EXTREMITY:? No clubbing, no cyanosis and?1+ to 2 +  peripheral edema; Dorsalis pedis and tibial ?pulses 2+  MUSCULOSKELETAL: FROM of Upper and Lower extremities; Strength 5/5 of Upper and Lower extremities  PSYCHOLOGICAL: Good judgement and insight; normal affect and mood.  NEUROLOGICAL: Normal gait and ?normal sensation;?no focal deficits; Cranial Nerves 2 -12 grossly intact  ?  Assessment/Plan  1.?HTN - Hypertension?I10  Chronic issue/ uncontrolled  Goal BP? for renal patient <130/80.? National Goal <140/90.  Continue to?monitor twice a day (AM & PM) BP and bring in log at next visit  Continue low sodium eating habit of ?less than 2 grams/ day/Dash eating habit  Encourage exercise for 30 minutes 5 days/ week (total 150 minutes/week)  Continue clonidine taper plan::  Weeks 1 through 2: Start clonidine 0.1 mg taking half a tab p.o. twice daily and start hydralazine 10 mg p.o. twice daily (first portion of taper started today)  Weeks 3-4: Start clonidine 0.1 mg taking half a tab p.o. on Mondays, Wednesdays, Fridays, Saturdays and increase hydralazine to 25 mg p.o. twice daily (if needed).  Start furosemide 20 mg po daily (per [facility])  -Discontinue Triamterene 50 mg po BID ( was discontinued prior to last visit)  Continue current medication:  - diltiazem ER?180 mg po BID.  -losartan 50 mg po BID  RTC in 2 weeks to monitor BP on taper and? for any side effects of clonidine withdrawal  Pt to call if BP>140/90 consistently for side effects of clonidine withdrawal occur  Patient expressed verbal understanding and agreement with the above.?  Refills given  Refills given- hydralazine, clonidine, losartan, clonidine  Ordered:  cloNIDine, See Instructions, Clonidine 0.1 mg taper: Weeks 1 -2: Take 0.5 tab po BID Weeks 3-4: Take 0.5 tabs po M,W,F, Saturday, # 30 tab(s), 0 Refill(s), Pharmacy: [id number], 175, cm, Height/Length Dosing, 08/25/20 11:30:00 CDT, 93.8, kg, Weight...  diltiazem, 180 mg = 1 cap(s), Oral, BID, # 180 cap(s), 3 Refill(s), Pharmacy:  [id number], 175, cm, Height/Length Dosing, 08/25/20 11:30:00 CDT, 93.8, kg, Weight Dosing, 08/25/20 11:30:00 CDT  losartan, 50 mg = 1 tab(s), Oral, BID, # 180 tab(s), 2 Refill(s), Pharmacy: [facility], 175, cm, Height/Length Dosing, 08/25/20 11:30:00 CDT, 93.8, kg, Weight Dosing, 08/25/20 11:30:00 CDT  ?  2.?Cardiac murmur, unspecified?R01.1  With?left bundle branch block  Chronic issue/ stable  Pt is asymptomatic  Continue BP control (BP <130/80) and ?LDL<100  EKG: Sinus bradycardia with LBBB; 48 BPM 12/26/19  TTE (1/14/2020: LVEF approximately 50%, trace mitral regurgitation, trace aortic regurgitation present, trace tricuspid regurgitation, trace pulmonic regurgitation; normal left and right atrium size; normal right ventricular size with preserved RV function.  Cardiology referral submitted 12/26/2019?and currently in pending status.  ?  3.?Chronic kidney disease (CKD), stage III (moderate)?N18.3  Chronic issue/slightly worsening  CKD is likely hypertensive nephropathy- will attempt better control of HTN  GFR (8/6/2020): 32 with a BUN/creatinine 41/2.0  Follow low sodium diet (less than 2 grams a day)  Control high blood pressure ( goal BP < 130/80, please record BP at home every day and bring log to next office visit)  Exercise at least 30 minutes a day, 5 days a week.  Maintain healthy weight.  Stay well hydrated with water  Do not take NSAIDs (Ibuprofen, Naproxen, Aleve, Advil, Toradol, Mobic), may take only Tylenol as needed for pain/headaches.  Keep?follow up appointment?with?[facility]  ??????  ?  4.?Plantar fasciitis?M72.2  New problem/ improving  Discussed with patient plantar fasciitis, conservative treatment, and medication treatment  Patient has stage?III kidney disease and NSAIDs are contraindicated.  Demonstrated and?discussed plantar?fasciitis exercises  Continue Tylenol 1 g p.o. 3 times daily as needed pain  Ambulatory referral for podiatry and physical therapy(submitted  8/12/2020 for previous visit). PT starts 8/26/2020.  Labs ordered see below  ?   5. Gout- refill given for allopurinol.Chronic issue/ stable  ?  Orders:  allopurinol, 100 mg = 1 tab(s), Oral, Daily, X 90 day(s), # 90 tab(s), 2 Refill(s), Pharmacy: [facility], 175, cm, Height/Length Dosing, 08/25/20 11:30:00 CDT, 93.8, kg, Weight Dosing, 08/25/20 11:30:00 CDT  furosemide, 20 mg = 1 tab(s), Oral, Daily, as needed ofr swelling, # 90 tab(s), 0 Refill(s), Pharmacy: [facility], 175, cm, Height/Length Dosing, 08/25/20 11:30:00 CDT, 93.8, kg, Weight Dosing, 08/25/20 11:30:00 CDT  hydrALAZINE, 10 mg = 1 tab(s), Oral, BID, # 60 tab(s), 1 Refill(s), Pharmacy: [id number], 175, cm, Height/Length Dosing, 08/25/20 11:30:00 CDT, 93.8, kg, Weight Dosing, 08/25/20 11:30:00 CDT  Occupational Therapy Eval and Treat OP, 08/26/20 10:00:00 CDT, ADL, Stop date 08/26/20 10:00:00 CDT   Problem List/Past Medical History  Ongoing  Cardiac murmur, unspecified  Chronic kidney disease (CKD), stage III (moderate)  Essential hypertension  HTN - Hypertension  Obesity  Obesity  Plantar fasciitis  Historical  Pregnant  Pregnant  Procedure/Surgical History  Tonsillotomy (1984)  Bone spur  bone spur  Tubal ligation   Medications  allopurinol 100 mg oral tablet, 100 mg= 1 tab(s), Oral, Daily, 2 refills  amlodipine 5 mg oral tablet, 5 mg= 1 tab(s), Oral, Daily  cloniDINE 0.1 mg oral tablet, See Instructions  diltiazem 180 mg/24 hours oral CAPsule, extended release, 180 mg= 1 cap(s), Oral, BID, 3 refills  furosemide 20 mg oral tablet, 20 mg= 1 tab(s), Oral, Daily  hydrALAZINE 10 mg oral tablet, 10 mg= 1 tab(s), Oral, BID, 1 refills  ketorolac, 60 mg= 2 mL, IM, Once,? ?Not taking  losartan 50 mg oral tablet, 50 mg= 1 tab(s), Oral, BID, 2 refills  traMADol, 50 mg= 1 tab(s), Oral, Once  Allergies  No Known Allergies  Social History  Abuse/Neglect  No, No, Yes, 08/25/2020  Alcohol  Current, Wine, 1-2 times per year, Alcohol use  interferes with work or home: No. Others hurt by drinking: No. Household alcohol concerns: No., 06/05/2020  Employment/School  Employed, 01/31/2020  Exercise  Exercise duration: 0., 08/06/2020  Home/Environment  Lives with Children., 12/12/2019    Never in , 08/06/2020  Nutrition/Health  Regular, Good, 12/12/2019  Sexual  Sexually active: No., 12/12/2019  Spiritual/Cultural  Hoahaoism, 01/31/2020  Substance Use  Never, 06/05/2020  Tobacco  Former smoker, quit more than 30 days ago, No, 08/25/2020  Family History  Hypertension.: Mother and Father.  Renal failure syndrome.: Brother.  Stroke: Mother and Father.  Immunizations  Vaccine Date Status   tetanus/diphtheria/pertussis, acel(Tdap) 12/12/2019 Given   Health Maintenance  Health Maintenance  ???Pending?(in the next year)  ??? ??Due?  ??? ? ? ?Cervical Cancer Screening due??08/25/20??and every?  ??? ? ? ?Colorectal Screening due??08/25/20??and every?  ??? ? ? ?Influenza Vaccine due??08/25/20??and every?  ??? ? ? ?Zoster Vaccine due??08/25/20??and every?  ??? ??Due In Future?  ??? ? ? ?Hypertension Management-Education not due until??12/12/20??and every 1??year(s)  ??? ? ? ?Obesity Screening not due until??01/01/21??and every 1??year(s)  ??? ? ? ?Alcohol Misuse Screening not due until??01/02/21??and every 1??year(s)  ??? ? ? ?Diabetes Screening not due until??08/06/21??and every 1??year(s)  ??? ? ? ?Hypertension Management-BMP not due until??08/06/21??and every 1??year(s)  ???Satisfied?(in the past 1 year)  ??? ??Satisfied?  ??? ? ? ?ADL Screening on??08/25/20.??Satisfied by Heather Byrne LPN  ??? ? ? ?Alcohol Misuse Screening on??08/06/20.??Satisfied by Heather Byrne LPN  ??? ? ? ?Blood Pressure Screening on??08/25/20.??Satisfied by Heather Byrne LPN  ??? ? ? ?Body Mass Index Check on??08/25/20.??Satisfied by Heather Byrne LPN  ??? ? ? ?Breast Cancer Screening on??09/13/19.??Satisfied by Grazyna López  ??? ? ? ?Depression Screening  on??08/25/20.??Satisfied by Heather Byrne LPN  ??? ? ? ?Diabetes Screening on??08/06/20.??Satisfied by Tony Bradley Jr.  ??? ? ? ?Hypertension Management-Blood Pressure on??08/25/20.??Satisfied by Heather Byrne LPN  ??? ? ? ?Lipid Screening on??06/04/20.??Satisfied by Maryana Arechiga  ??? ? ? ?Obesity Screening on??08/25/20.??Satisfied by Heather Byrne LPN  ??? ? ? ?Tetanus Vaccine on??12/12/19.??Satisfied by Maria E Reyes LPN  ??? ??Refused?  ??? ? ? ?Zoster Vaccine on??08/25/20.??Recorded by Heather Byrne LPN??Reason: Patient Refuses  ?

## 2022-05-03 NOTE — HISTORICAL [facility] CERNER
This is a historical note converted from Louise. Formatting and pictures may have been removed.  Please reference Louise for original formatting and attached multimedia. Chief Complaint  Pain feet L > R  Hands stiffen, burn, swollen  Pain R shoulder  History of Present Illness  Pt is a??49 Years?old?Female?with a? past medical history of HTN, CKD 3, and new onset cardiac murmur (12/12/19).  ?  Presented to the ED on 7/26/2020 with complaints of left heel pain/foot swollen on plantar surfaces.? Patient was prescribed tramadol by ER and diagnosed with hypertension in in her fasciitis.? X-ray (7/26/2020) depicted no acute fracture or dislocation identified and heel spur.  ?  Acute issue:  Plantar Fasciitis: Pain feet L > R;? pt has been doing plantar fascitis exercises without no relief. Tramadol no relief. Pt has been walking on her toes. She bought shoes for the condition recommended by ER physician. 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 Pt was given ibuprofen by ER. NSAIDs contraindicated. pain 10/10.  ?  B/l hand stiffness: Duration 2-3 wks; worse in the morning; warm water soaks help; pt unable to twist open soda tops? on the left hand. Left hand dominant.  ?   Left scapular muscle pain:?Duration: 2 months ?pt states feel like a sticking pain.  ?   Chronic issues:  HTN: Adheres to low sodium diet and medications- Triamterene 50 mg po BID, Clonidine 0.2 mg po daily, and diltiazem ER?180 mg po BID.? BP range 138-140/ 70 -80. Highest was  about 3 wks ago.  ?   Cardiac murmur: Pt is asymptomatic. ?Reviewed and discussed with patient the?results of her SANDIE?with left ventricular ejection fraction of 50%. ?Referral for [facility]?is in pending acceptance?status.?  ?   CKD 3:??GFR (7/26/2020): 39 with a BUN/creatinine 33/1.75. Pt avoids NSAIDs.??Reviewed [facility] neph[facility] note form 1/16/2020 with the patient. Uric acid level was 7.5 and allopurinol was added by [facility] prophylactically  for gout.  ?   Patient denies chest pain, shortness of breath,?dyspnea on exertion, palpitations, peripheral edema,?abdominal pain, nausea, vomiting,?diarrhea,?constipation, fatigue, fever, chills,?dysuria,? hematuria, melena,?or hematochezia.  ?  ?  Reviewed labs from 7/26/2020 ED visit and from 6/4/2020  GFR (7/26/2020): 39 with a BUN/creatinine 33/1.75  FLP (6/4/2020): Total cholesterol 200, HDL 54, triglyceride 164, LDL  113-patient started on fish oil 1000 mg p.o. twice daily  ?  Review of Systems  GENERAL:?Negative for abnormal weight loss or weight gain, fatigue, fever, or chills. Negative except for stated in HPI.  HEENT:??Negative for head trauma,?blurred vision, rhinorrhea, ?nasal congestion, sore throat or hearing deficit. Negative except for stated in HPI.  CARDIOVASCULAR: Negative for? chest pain, palpitations, VILLASENOR, PND, orthopnea, peripheral edema or syncope. Negative except for stated in HPI.  RESPIRATORY: Negative for SOB, VILLASENOR, cough or?wheezing.? Negative except for stated in HPI.  GASTROINTESTINAL: Negative for abdominal pain, nausea,??vomiting, diarrhea, constipation, heartburn, ?hematochezia? or melena.? Negative except for stated in HPI.  GENITOURINARY: Negative for dysuria, hematuria, urinary frequency, urinary urgency, urinary hesitancy or flank pain. Negative except for stated in HPI.  ENDOCRINE: Negative for fatigue, heat intolerance, cold intolerance, polyuria, polydipsia, or polyphagia.? Negative except for stated in HPI.  PSYCHOLOGICAL: Negative for depression, anxiety, suicidal or homicidal ideations, hallucinations??or?florin.? Negative except for?stated in HPI.  MUSCULOSKELETAL: Negative except for?stated in HPI.  INTEGUMENT: Negative for rash?or lesions. Negative except for stated in HPI.  NEUROLOGY: Negative for headaches, dizziness, numbness, tingling,?vertigo?or gait disturbances. Negative except for stated in HPI.  ?  ?  Physical Exam  Vitals & Measurements  T:?36.7? ?C (Oral)?  HR:?60(Peripheral)? RR:?18? BP:?166/98? SpO2:?100%?  HT:?175.00?cm? WT:?92.500?kg? BMI:?30.2?  GENERAL:?Alert and oriented to person, place, time and situation,?NAD  HEENT: NCAT, Pupils equally round and reactive to light accommodation,?normal sclera, ?moist mucous membranes,?oropharynx normal,?normal nasal turbinates/ nares,??TM clear bilaterally, neck?supple,?thyroid normal,?no lymphadenopathy.  CARDIOVASCULAR:?Regular rateand?rhythm,? S1 and S2 normal;?no murmurs, no rubs, or no gallops; no carotid bruit.  RESPIRATORY:??Lungs clear to auscultation bilaterally;?no wheezes,?no rhonchi,?or? no crackles  ABDOMEN: Soft/ Nontender/ Nondistended; Bowel Sounds x4 - normoactive; no abdominal pulsatile mass, no masses, no hernias  EXTREMITY:? No clubbing, no cyanosis and?no peripheral edema; Dorsalis pedis and tibial ?pulses 2+  MUSCULOSKELETAL: FROM of Upper and Lower extremities; Strength 5/5 of RUE/RLE and 4/5 weakness LUE/ LLE; TTP with warmth and swelling of left heel and increased pain on? dorsiflexion and palpation of the soles of the feet; swelling of the bilateral?MCP?joints?with decreased  on the left side.  PSYCHOLOGICAL: Good judgement and insight; normal affect and mood.  NEUROLOGICAL: Normal gait and ?normal sensation;?no focal deficits- no slurred speech, no pronator drift, no facial droop; Cranial Nerves 2 -12 grossly intact  ?  Assessment/Plan  1.?HTN - Hypertension?I10  Chronic issue/ uncontrolled  Goal BP? for renal patient <130/80.? National Goal <140/90. Goal Met. ?/87  Continue to?monitor BP daily and bring in log at next visit  Continue low sodium eating habit of ?less than 2 grams/ day/Dash eating habit  Encourage exercise for 30 minutes 5 days/ week (total 150 minutes/week)  Continue current medication.  Continue:  -Triamterene 50 mg po BID  - Clonidine 0.2 mg po dailily  - diltiazem ER?180 mg po BID.  -losartan 50 mg po daily  CBC with diff , CMP, and TSH ordered  Ordered:  CBC w/ Auto  Diff, Routine collect, *Est. 08/06/20 3:00:00 CDT, Blood, Order for future visit, *Est. Stop date 08/06/20 3:00:00 CDT, Lab Collect, Weakness of one side of body  HTN - Hypertension  Chronic kidney disease (CKD), stage III (moderate), 08/06/20 11:28:00 CDT  Clinic Follow up, *Est. 08/13/20 3:00:00 CDT, F2F, Order for future visit, HTN - Hypertension  Plantar fasciitis, bilateral  Weakness of one side of body  Hand pain  Muscle strain  Chronic kidney disease (CKD), stage III (moderate), [facility]  Comprehensive Metabolic Panel, Routine collect, *Est. 08/06/20 3:00:00 CDT, Blood, Order for future visit, *Est. Stop date 08/06/20 3:00:00 CDT, Lab Collect, Weakness of one side of body  HTN - Hypertension  Chronic kidney disease (CKD), stage III (moderate), 08/06/20 11:28:00 CDT  Office/Outpatient Visit Level 4 Established [id number] PC, HTN - Hypertension  Plantar fasciitis, bilateral  Weakness of one side of body  Hand pain  Muscle strain  Chronic kidney disease (CKD), stage III (moderate), [facility], 08/06/20 11:37:00 CDT  Thyroid Stimulating Hormone, Routine collect, *Est. 08/06/20 3:00:00 CDT, Blood, Order for future visit, *Est. Stop date 08/06/20 3:00:00 CDT, Lab Collect, Weakness of one side of body  HTN - Hypertension  Chronic kidney disease (CKD), stage III (moderate), 08/06/20 11:28:00 CDT  ?  2.?Cardiac murmur, unspecified?R01.1  With?left bundle branch block  Chronic issue/ stable  Continue BP control (BP <130/80) and ?LDL<100  EKG: Sinus bradycardia with LBBB; 48 BPM 12/26/19  TTE (1/14/2020: LVEF approximately 50%, trace mitral regurgitation, trace aortic regurgitation present, trace tricuspid regurgitation, trace pulmonic regurgitation; normal left and right atrium size; normal right ventricular size with preserved RV function.  Cardiology referral submitted 12/26/2019?and currently in pending status.  ?  3.?Chronic kidney disease (CKD), stage III (moderate)?N18.3  Chronic  issue/ worsening?now stage 4  GFR increased slightly to 41 (1/14/2020) with BUN/creatinine decreasing to 31/1.70 ?  Follow low sodium diet (less than 2 grams a day)  Control high blood pressure ( goal BP < 130/80, please record BP at home every day and bring log to next office visit)  Exercise at least 30 minutes a day, 5 days a week.  Maintain healthy weight.  Stay well hydrated with water  Do not take NSAIDs (Ibuprofen, Naproxen, Aleve, Advil, Toradol, Mobic), may take only Tylenol as needed for pain/headaches.  Reviewed [facility] note with the patient from 1/16/2020: Nephrology stated that the etiology of the CKD is likely hypertensive nephropathy and further studies will be done  Follow up with [facility] ( next appointment)  Handout? on CKD and foods for CKD given  Ordered see below  Ordered:  CBC w/ Auto Diff, Routine collect, *Est. 08/06/20 3:00:00 CDT, Blood, Order for future visit, *Est. Stop date 08/06/20 3:00:00 CDT, Lab Collect, Weakness of one side of body  HTN - Hypertension  Chronic kidney disease (CKD), stage III (moderate), 08/06/20 11:28:00 CDT  Clinic Follow up, *Est. 08/13/20 3:00:00 CDT, F2F, Order for future visit, HTN - Hypertension  Plantar fasciitis, bilateral  Weakness of one side of body  Hand pain  Muscle strain  Chronic kidney disease (CKD), stage III (moderate), [facility]  Comprehensive Metabolic Panel, Routine collect, *Est. 08/06/20 3:00:00 CDT, Blood, Order for future visit, *Est. Stop date 08/06/20 3:00:00 CDT, Lab Collect, Weakness of one side of body  HTN - Hypertension  Chronic kidney disease (CKD), stage III (moderate), 08/06/20 11:28:00 CDT  Office/Outpatient Visit Level 4 Established [id number] PC, HTN - Hypertension  Plantar fasciitis, bilateral  Weakness of one side of body  Hand pain  Muscle strain  Chronic kidney disease (CKD), stage III (moderate), [facility], 08/06/20 11:37:00 CDT  Thyroid Stimulating Hormone, Routine collect, *Est.  08/06/20 3:00:00 CDT, Blood, Order for future visit, *Est. Stop date 08/06/20 3:00:00 CDT, Lab Collect, Weakness of one side of body  HTN - Hypertension  Chronic kidney disease (CKD), stage III (moderate), 08/06/20 11:28:00 CDT  ?  4.?Plantar fasciitis, bilateral?M72.2  New problem/ requiring further work up  Discussed with patient plantar fasciitis, conservative treatment, and medication treatment  Patient has stage?IV kidney disease and NSAIDs are contraindicated.  Demonstrated and?discussed plantar?fasciitis exercises  Start Tylenol 1 g p.o. 3 times daily as needed pain  Ambulatory referral for podiatry and physical therapy submitted  Labs ordered see below  Ordered:  Clinic Follow up, *Est. 08/13/20 3:00:00 CDT, F2F, Order for future visit, HTN - Hypertension  Plantar fasciitis, bilateral  Weakness of one side of body  Hand pain  Muscle strain  Chronic kidney disease (CKD), stage III (moderate), [facility]  CRP, Routine collect, *Est. 08/06/20 3:00:00 CDT, Blood, Order for future visit, *Est. Stop date 08/06/20 3:00:00 CDT, Lab Collect, Hand pain  Plantar fasciitis, bilateral, 08/06/20 11:28:00 CDT  Office/Outpatient Visit Level 4 Established [id number] PC, HTN - Hypertension  Plantar fasciitis, bilateral  Weakness of one side of body  Hand pain  Muscle strain  Chronic kidney disease (CKD), stage III (moderate), [facility], 08/06/20 11:37:00 CDT  Sedimentation Rate, Routine collect, *Est. 08/06/20 3:00:00 CDT, Blood, Order for future visit, *Est. Stop date 08/06/20 3:00:00 CDT, Lab Collect, Hand pain  Plantar fasciitis, bilateral, 08/06/20 11:28:00 CDT  Uric Acid, Routine collect, *Est. 08/06/20 3:00:00 CDT, Blood, Order for future visit, *Est. Stop date 08/06/20 3:00:00 CDT, Lab Collect, Hand pain  Plantar fasciitis, bilateral, 08/06/20 11:28:00 CDT  ?  5.?Weakness of one side of body?R53.1  New problem/requiring further work-up  Concern for CVA with left sided weakness starting 2  -3 wks ago  MRI of the brain without contrast ordered  Labs ordered see below  Ordered:  CBC w/ Auto Diff, Routine collect, *Est. 08/06/20 3:00:00 CDT, Blood, Order for future visit, *Est. Stop date 08/06/20 3:00:00 CDT, Lab Collect, Weakness of one side of body  HTN - Hypertension  Chronic kidney disease (CKD), stage III (moderate), 08/06/20 11:28:00 CDT  Clinic Follow up, *Est. 08/13/20 3:00:00 CDT, F2F, Order for future visit, HTN - Hypertension  Plantar fasciitis, bilateral  Weakness of one side of body  Hand pain  Muscle strain  Chronic kidney disease (CKD), stage III (moderate), [facility]  Comprehensive Metabolic Panel, Routine collect, *Est. 08/06/20 3:00:00 CDT, Blood, Order for future visit, *Est. Stop date 08/06/20 3:00:00 CDT, Lab Collect, Weakness of one side of body  HTN - Hypertension  Chronic kidney disease (CKD), stage III (moderate), 08/06/20 11:28:00 CDT  MRI Brain W/O Contrast, Routine, *Est. 08/10/20 3:00:00 CDT, Other (please specify), Neuro deficit, acute, persistent or progressing, None, Ambulatory, Rad Type, Order for future visit, Weakness of one side of body, Schedule this test, [facility], *Est....  Office/Outpatient Visit Level 4 Established [id number] PC, HTN - Hypertension  Plantar fasciitis, bilateral  Weakness of one side of body  Hand pain  Muscle strain  Chronic kidney disease (CKD), stage III (moderate), [facility], 08/06/20 11:37:00 CDT  Thyroid Stimulating Hormone, Routine collect, *Est. 08/06/20 3:00:00 CDT, Blood, Order for future visit, *Est. Stop date 08/06/20 3:00:00 CDT, Lab Collect, Weakness of one side of body  HTN - Hypertension  Chronic kidney disease (CKD), stage III (moderate), 08/06/20 11:28:00 CDT  ?  6.?Hand pain?M79.643  New problem/requiring further work-up  Start Tylenol 1 g p.o. 3 times daily as needed pain  NSAIDs contraindicated  Labs ordered see below  Ordered:  Clinic Follow up, *Est. 08/13/20 3:00:00 CDT,  F2F, Order for future visit, HTN - Hypertension  Plantar fasciitis, bilateral  Weakness of one side of body  Hand pain  Muscle strain  Chronic kidney disease (CKD), stage III (moderate), [facility]  CRP, Routine collect, *Est. 08/06/20 3:00:00 CDT, Blood, Order for future visit, *Est. Stop date 08/06/20 3:00:00 CDT, Lab Collect, Hand pain  Plantar fasciitis, bilateral, 08/06/20 11:28:00 CDT  Office/Outpatient Visit Level 4 Established [id number] PC, HTN - Hypertension  Plantar fasciitis, bilateral  Weakness of one side of body  Hand pain  Muscle strain  Chronic kidney disease (CKD), stage III (moderate), [facility], 08/06/20 11:37:00 CDT  Sedimentation Rate, Routine collect, *Est. 08/06/20 3:00:00 CDT, Blood, Order for future visit, *Est. Stop date 08/06/20 3:00:00 CDT, Lab Collect, Hand pain  Plantar fasciitis, bilateral, 08/06/20 11:28:00 CDT  Uric Acid, Routine collect, *Est. 08/06/20 3:00:00 CDT, Blood, Order for future visit, *Est. Stop date 08/06/20 3:00:00 CDT, Lab Collect, Hand pain  Plantar fasciitis, bilateral, 08/06/20 11:28:00 CDT  ?  7.?Muscle strain?T14.8XXA  Location: Left shoulder?blade  Start tizanidine?4 mg p.o. twice daily  Start Tylenol 1 g p.o. 3 times daily as needed pain  ?  Ordered:  tiZANidine, 4 mg = 2 tab(s), Oral, BID, may cause drowsiness, # 20 tab(s), 0 Refill(s), Pharmacy: [id number], 175, cm, Height/Length Dosing, 08/06/20 10:22:00 CDT, 92.5, kg, Weight Dosing, 08/06/20 10:22:00 CDT  Clinic Follow up, *Est. 08/13/20 3:00:00 CDT, F2F, Order for future visit, HTN - Hypertension  Plantar fasciitis, bilateral  Weakness of one side of body  Hand pain  Muscle strain  Chronic kidney disease (CKD), stage III (moderate), [facility]  Office/Outpatient Visit Level 4 Established [id number] PC, HTN - Hypertension  Plantar fasciitis, bilateral  Weakness of one side of body  Hand pain  Muscle strain  Chronic kidney disease (CKD), stage III  (moderate), [facility], 08/06/20 11:37:00 CDT  ?  RTC in 7 days for follow up on left side weakness, plantar fascitis,? hand pain and muscle strain; results of MRI.   Problem List/Past Medical History  Ongoing  Cardiac murmur, unspecified  Chronic kidney disease (CKD), stage III (moderate)  Essential hypertension  HTN - Hypertension  Obesity  Historical  Pregnant  Pregnant  Procedure/Surgical History  Tonsillotomy (1984)  Bone spur  bone spur  Tubal ligation   Medications  allopurinol 100 mg oral tablet, 100 mg= 1 tab(s), Oral, Daily, 1 refills  cloniDINE 0.1 mg oral tablet, 0.1 mg= 1 tab(s), Oral, BID, 1 refills  diltiazem 180 mg/24 hours oral CAPsule, extended release, 180 mg= 1 cap(s), Oral, BID, 3 refills  ketorolac, 60 mg= 2 mL, IM, Once,? ?Not taking  losartan 50 mg oral tablet, 50 mg= 1 tab(s), Oral, BID, 3 refills  tiZANidine 2 mg oral tablet, 4 mg= 2 tab(s), Oral, BID  traMADol, 50 mg= 1 tab(s), Oral, Once  triamterene 50 mg oral capsule, 50 mg= 1 cap(s), Oral, BID, 1 refills  Allergies  No Known Allergies  Social History  Abuse/Neglect  No, No, Yes, 08/06/2020  Alcohol  Current, Wine, 1-2 times per year, Alcohol use interferes with work or home: No. Others hurt by drinking: No. Household alcohol concerns: No., 06/05/2020  Employment/School  Employed, 01/31/2020  Exercise  Exercise duration: 0., 08/06/2020  Home/Environment  Lives with Children., 12/12/2019    Never in , 08/06/2020  Nutrition/Health  Regular, Good, 12/12/2019  Sexual  Sexually active: No., 12/12/2019  Spiritual/Cultural  Syn[employer], 01/31/2020  Substance Use  Never, 06/05/2020  Tobacco  Former smoker, quit more than 30 days ago, N/A, 08/06/2020  Family History  Hypertension.: Mother and Father.  Renal failure syndrome.: Brother.  Stroke: Mother and Father.  Immunizations  Vaccine Date Status   tetanus/diphtheria/pertussis, acel(Tdap) 12/12/2019 Given   Health Maintenance  Health Maintenance  ???Pending?(in the next  year)  ??? ??Due?  ??? ? ? ?Cervical Cancer Screening due??08/06/20??and every?  ??? ? ? ?Colorectal Screening due??08/06/20??and every?  ??? ? ? ?Influenza Vaccine due??08/06/20??and every?  ??? ? ? ?Zoster Vaccine due??08/06/20??and every?  ??? ??Due In Future?  ??? ? ? ?Hypertension Management-Education not due until??12/12/20??and every 1??year(s)  ??? ? ? ?Obesity Screening not due until??01/01/21??and every 1??year(s)  ??? ? ? ?Alcohol Misuse Screening not due until??01/02/21??and every 1??year(s)  ??? ? ? ?ADL Screening not due until??06/05/21??and every 1??year(s)  ??? ? ? ?Diabetes Screening not due until??07/26/21??and every 1??year(s)  ??? ? ? ?Hypertension Management-BMP not due until??07/26/21??and every 1??year(s)  ???Satisfied?(in the past 1 year)  ??? ??Satisfied?  ??? ? ? ?ADL Screening on??06/05/20.??Satisfied by Carrie Bueno  ??? ? ? ?Alcohol Misuse Screening on??08/06/20.??Satisfied by Heather Byrne LPN  ??? ? ? ?Blood Pressure Screening on??08/06/20.??Satisfied by Heather Byrne LPN  ??? ? ? ?Body Mass Index Check on??08/06/20.??Satisfied by Heather Byrne LPN  ??? ? ? ?Breast Cancer Screening on??09/13/19.??Satisfied by Grazyna López  ??? ? ? ?Depression Screening on??08/06/20.??Satisfied by Heather Byrne LPN  ??? ? ? ?Diabetes Screening on??07/26/20.??Satisfied by Belem Hayes  ??? ? ? ?Hypertension Management-Blood Pressure on??08/06/20.??Satisfied by Heather Byrne LPN  ??? ? ? ?Lipid Screening on??06/04/20.??Satisfied by Maryana Arechiga  ??? ? ? ?Obesity Screening on??08/06/20.??Satisfied by Heather Byrne LPN  ??? ? ? ?Tetanus Vaccine on??12/12/19.??Satisfied by Maria E Reyes LPN  ??? ??Refused?  ??? ? ? ?Zoster Vaccine on??08/06/20.??Recorded by Heather Byrne LPN??Reason: Patient Refuses  ?      Pt was also prescribed a Medrol dose pack to [facility] for b/l plantar fascitis and hand pain.

## 2022-05-03 NOTE — HISTORICAL [facility]
This is a historical note converted from [employer]. Formatting and pictures may have been removed.  Please reference C[employer]ike for original formatting and attached multimedia. Chief Complaint  RTC, c/o dyspnea ambulating  History of Present Illness  This is a 50-year-old -American female with past medical history of CKD stage III, hypertension, hyperuricemia, and obesity.? Presents today for follow-up appointment in nephrology clinic, reports compliance with medication regimen, states she was able to?wean off?clonidine completely. ?Does report?dyspnea?on exertion occasionally. ?Denies swelling. ?No history of?tobacco abuse or asthma.  Review of Systems  12 point review of systems conducted, negative except as stated in the history of present illness.  Physical Exam  Vitals & Measurements  T:?36.7? ?C (Oral)? HR:?71(Peripheral)? RR:?18? BP:?135/90?  HT:?176.50?cm? WT:?94.900?kg? BMI:?30.46?  General appearance: Appears in no acute distress.?  Skin: No rashes, capillary refill <3 sec. Good turgor.?  HEENT: NC/AT. PERRLA, EOMI, no scleral icterus. No thyromegaly, JVD, neck supple.?  Chest: Equal expansion, clear to auscultation bilaterally, respirations unlabored.?  Heart: S1-S2, regular rate and rhythm, no murmurs, pulses palpable throughout.?  Abdomen: Benign.?  Genitourinary: Deferred  Extremities: No edema, no cyanosis or clubbing, pulses equal and palpable throughout.?  Neurological: No focal deficits  Assessment/Plan  1.?CKD stage G3b/A3, GFR 30-44 and albumin creatinine ratio >300 mg/g?N18.3  Hypertensive nephropathy.? Blood pressure is at goal. ?Continue RAASi?therapy and risk factor management.  Continue renal sparing activities:  ?   -Follow low sodium diet (2 grams a day)  -Control high blood pressure (?goal BP < 130/80, please record BP at home every day and bring log to next office visit)  -Exercise at least 30 minutes a day, 5 days a week.  -Maintain healthy weight.  -Decrease or stop alcohol use  -Do  not smoke  -Stay well hydrated  -Receive Pneumovax, Flu, and HBV vaccines if indicated.  -Do not take NSAIDs (Ibuprofen, Naproxen, Aleve, Advil, Toradol, Mobic), may take only Tylenol as needed for pain/headaches.  -Take cholesterol-lowering medications as prescribed (LDL goal <100)  ?   Handout on treatment options for kidney disease provided.  F/U with PCP as scheduled  [facility] with routine labs in?6 months  Ordered:  1160F- Medication reconciliation completed during visit, CKD stage G3b/A3, GFR 30-44 and albumin creatinine ratio >300 mg/g  HTN - Hypertension  Gout  BMI 30.0-30.9,adult, [facility], 12/28/20 10:34:00 CST  Clinic Follow up, *Est. 06/28/21 3:00:00 CDT, CKD, Follow up with HARVEY Huggins in 6 months, Order for future visit, CKD stage G3b/A3, GFR 30-44 and albumin creatinine ratio >300 mg/g, [facility]  Office/Outpatient Visit Level 4 Established [id number] PC, CKD stage G3b/A3, GFR 30-44 and albumin creatinine ratio >300 mg/g  HTN - Hypertension  Gout  BMI 30.0-30.9,adult, [facility], 12/28/20 10:34:00 CST  ?  2.?HTN - Hypertension?I10  Blood pressure is at goal. ?Continue current antihypertensive regimen.  Ordered:  1160F- Medication reconciliation completed during visit, CKD stage G3b/A3, GFR 30-44 and albumin creatinine ratio >300 mg/g  HTN - Hypertension  Gout  BMI 30.0-30.9,adult, [facility], 12/28/20 10:34:00 CST  Office/Outpatient Visit Level 4 Established [id number] PC, CKD stage G3b/A3, GFR 30-44 and albumin creatinine ratio >300 mg/g  HTN - Hypertension  Gout  BMI 30.0-30.9,adult, [facility], 12/28/20 10:34:00 CST  ?  3.?Gout?M10.9  Uric acid level is above goal. ?Will increase allopurinol to 200 mg by mouth daily.  Ordered:  1160F- Medication reconciliation completed during visit, CKD stage G3b/A3, GFR 30-44 and albumin creatinine ratio >300 mg/g  HTN - Hypertension  Gout  BMI 30.0-30.9,adult, [facility], 12/28/20 10:34:00  CST  Office/Outpatient Visit Level 4 Established [id number] PC, CKD stage G3b/A3, GFR 30-44 and albumin creatinine ratio >300 mg/g  HTN - Hypertension  Gout  BMI 30.0-30.9,adult, [facility], 12/28/20 10:34:00 CST  ?  4.?BMI 30.0-30.9,adult?Z68.30  Lifestyle/dietary interventions discussed: counseled on weight loss using portion control, non-sedentary lifestyle, low-carb, low-fat, 1800-calorie ADA diet.?  Ordered:  1160F- Medication reconciliation completed during visit, CKD stage G3b/A3, GFR 30-44 and albumin creatinine ratio >300 mg/g  HTN - Hypertension  Gout  BMI 30.0-30.9,adult, [facility], 12/28/20 10:34:00 CST  Office/Outpatient Visit Level 4 Established [id number] PC, CKD stage G3b/A3, GFR 30-44 and albumin creatinine ratio >300 mg/g  HTN - Hypertension  Gout  BMI 30.0-30.9,adult, [facility], 12/28/20 10:34:00 CST  ?  5.?Shortness of breath?R06.02  Will obtain echocardiogram. ?Follow-up with results by phone.  ?   Problem List/Past Medical History  Ongoing  Cardiac murmur, unspecified  Chronic kidney disease (CKD), stage III (moderate)  Essential hypertension  HTN - Hypertension  Obesity  Obesity  Plantar fasciitis  Historical  Pregnant  Pregnant  Procedure/Surgical History  Tonsillotomy (1984)  Bone spur  bone spur  Tubal ligation   Medications  allopurinol 100 mg oral tablet, 100 mg= 1 tab(s), Oral, Daily, 2 refills  amlodipine 10 mg oral tablet, 10 mg= 1 tab(s), Oral, Daily, 1 refills  cloniDINE 0.1 mg oral tablet, See Instructions,? ?Still taking, not as prescribed: TAKES IF BP IS OVER 150/100  diltiazem 180 mg/24 hours oral CAPsule, extended release, 180 mg= 1 cap(s), Oral, BID, 3 refills  furosemide 20 mg oral tablet, See Instructions  hydrALAZINE 10 mg oral tablet, See Instructions, 5 refills  losartan 50 mg oral tablet, 50 mg= 1 tab(s), Oral, BID, 2 refills  traMADol, 50 mg= 1 tab(s), Oral, Once  Allergies  No Known Allergies  Social History  Abuse/Neglect  No, No, Yes,  12/28/2020  Alcohol  Current, Wine, 1-2 times per year, Alcohol use interferes with work or home: No. Others hurt by drinking: No. Household alcohol concerns: No., 12/28/2020  Employment/School  Employed, 01/31/2020  Exercise  Exercise duration: 30. Exercise frequency: Daily. Exercise type: Walking., 12/28/2020  Financial/Legal Situation  None, 11/25/2020  Home/Environment  Lives with Children., 12/12/2019    Never in , 08/06/2020  Nutrition/Health  Low fat, Low sodium, Good, 12/28/2020  Sexual  Sexually active: No., 12/12/2019  Spiritual/Cultural  [employer], 12/28/2020  Substance Use  Never, 06/05/2020  Tobacco  Former smoker, quit more than 30 days ago, N/A, 12/28/2020  Family History  Hypertension.: Mother and Father.  Renal failure syndrome.: Brother.  Stroke: Mother and Father.  Immunizations  Vaccine Date Status   tetanus/diphtheria/pertussis, acel(Tdap) 12/12/2019 Given   Health Maintenance  Health Maintenance  ???Pending?(in the next year)  ??? ??OverDue  ??? ? ? ?Influenza Vaccine due??10/01/20??and every 1??day(s)  ??? ? ? ?Hypertension Management-Education due??12/12/20??and every 1??year(s)  ??? ??Due?  ??? ? ? ?Cervical Cancer Screening due??12/28/20??Unknown Frequency  ??? ? ? ?Colorectal Screening due??12/28/20??Unknown Frequency  ??? ? ? ?Zoster Vaccine due??12/28/20??Unknown Frequency  ??? ??Due In Future?  ??? ? ? ?Obesity Screening not due until??01/01/21??and every 1??year(s)  ??? ? ? ?Alcohol Misuse Screening not due until??01/02/21??and every 1??year(s)  ??? ? ? ?Depression Screening not due until??08/25/21??and every 1??year(s)  ??? ? ? ?Diabetes Screening not due until??08/25/21??and every 1??year(s)  ??? ? ? ?Hypertension Management-BMP not due until??08/25/21??and every 1??year(s)  ??? ? ? ?Breast Cancer Screening not due until??09/12/21??and every 2??year(s)  ???Satisfied?(in the past 1 year)  ??? ??Satisfied?  ??? ? ? ?ADL Screening on??12/28/20.??Satisfied by Myles  Carrie  ??? ? ? ?Alcohol Misuse Screening on??08/06/20.??Satisfied by Heather Byrne LPN  ??? ? ? ?Blood Pressure Screening on??12/28/20.??Satisfied by Carrie Bueno  ??? ? ? ?Body Mass Index Check on??12/28/20.??Satisfied by Carrie Bueno  ??? ? ? ?Depression Screening on??08/25/20.??Satisfied by Heather Byrne LPN  ??? ? ? ?Diabetes Screening on??08/25/20.??Satisfied by Silke Reeves  ??? ? ? ?Hypertension Management-Blood Pressure on??12/28/20.??Satisfied by Carrie Bueno  ??? ? ? ?Influenza Vaccine on??12/28/20.??Satisfied by Carrie Bueno  ??? ? ? ?Lipid Screening on??10/19/20.??Satisfied by Douglas Orozco  ??? ? ? ?Obesity Screening on??12/28/20.??Satisfied by Carrie Bueno  ??? ??Refused?  ??? ? ? ?Zoster Vaccine on??08/25/20.??Recorded by Heather Byrne LPN??Reason: Patient Refuses  ?  Lab Results  Test Name Test Result Date/Time   Sodium Lvl 143 mmol/L 12/28/2020 11:03 CST   Potassium Lvl 4.0 mmol/L 12/28/2020 11:03 CST   Chloride 106 mmol/L 12/28/2020 11:03 CST   CO2 27 mmol/L 12/28/2020 11:03 CST   Calcium Lvl 9.4 mg/dL 12/28/2020 11:03 CST   Magnesium Lvl 2.35 mg/dL 12/28/2020 11:03 CST   Glucose Lvl 81 mg/dL 12/28/2020 11:03 CST   BUN 35.0 mg/dL (High) 12/28/2020 11:03 CST   Creatinine 1.89 mg/dL (High) 12/28/2020 11:03 CST   eGFR-AA 36 (Low) 12/28/2020 11:03 CST   Bili Total 0.3 mg/dL 12/28/2020 11:03 CST   Bili Direct 0.1 mg/dL 12/28/2020 11:03 CST   Bili Indirect 0.20 mg/dL 12/28/2020 11:03 CST   AST 15 unit/L 12/28/2020 11:03 CST   ALT 14 unit/L 12/28/2020 11:03 CST   Alk Phos 87 unit/L 12/28/2020 11:03 CST   Total Protein 7.3 gm/dL 12/28/2020 11:03 CST   Albumin Lvl 4.0 gm/dL 12/28/2020 11:03 CST   Globulin 3.3 gm/dL 12/28/2020 11:03 CST   A/G Ratio 1.2 ratio 12/28/2020 11:03 CST   Phosphorus 2.9 mg/dL 12/28/2020 11:03 CST   Uric Acid 9.5 mg/dL (High) 12/28/2020 11:03 CST   Vit D 25 OH 54.1 ng/mL 12/28/2020 11:03 CST   PTH Intact 117.1 pg/mL (High) 12/28/2020 11:03 CST   U  Prot/Creat 933.5 mg/gm (High) 12/28/2020 10:59 CST   WBC 3.7 x10(3)/mcL (Low) 12/28/2020 11:03 CST   RBC 5.10 x10(6)/mcL 12/28/2020 11:03 CST   Hgb 13.7 gm/dL 12/28/2020 11:03 CST   Hct 44.2 % 12/28/2020 11:03 CST   Platelet 297 x10(3)/mcL 12/28/2020 11:03 CST   UA Appear Clear 12/28/2020 10:59 CST   UA Color LIGHT YELLOW 12/28/2020 10:59 CST   UA Spec Grav 1.010 12/28/2020 10:59 CST   UA Bili Negative 12/28/2020 10:59 CST   UA pH 5.5 12/28/2020 10:59 CST   UA Urobilinogen Normal 12/28/2020 10:59 CST   UA Blood 0.03 (Abnormal) 12/28/2020 10:59 CST   UA Glucose Negative 12/28/2020 10:59 CST   UA Ketones Negative 12/28/2020 10:59 CST   UA Protein 100 (Abnormal) 12/28/2020 10:59 CST   UA Nitrite Negative 12/28/2020 10:59 CST   UA Leuk Est Negative 12/28/2020 10:59 CST   UA WBC Interp 3-5 (Abnormal) 12/28/2020 10:59 CST   UA RBC Interp 0-2 12/28/2020 10:59 CST   UA Bact Interp Rare 12/28/2020 10:59 CST   UA Squam Epi Interp  (Abnormal) 12/28/2020 10:59 CST   UA Hyal Cast Interp 0-2 (Abnormal) 12/28/2020 10:59 CST   Diagnostic Results  (03/03/2020 10:00 CST US Retroperitoneum Limited)  Grayscale and color Doppler images of the kidneys were obtained. The  right kidney measures 9 cm and the left kidney measures 9 cm in  length. There is heterogeneous renal parenchymal echogenicity with  loss of corticomedullary differentiation. There is no hydronephrosis.  There is a 2 cm cyst of the left kidney.?  ?  Impression  Findings suggestive of medical renal disease.  ? [1]     [1]?US Retroperitoneum Limited; Carlos ESCOBAR, Douglas Harley 03/03/2020 10:00 CST

## 2022-06-24 RX ORDER — DILTIAZEM HYDROCHLORIDE 180 MG/1
1 CAPSULE, EXTENDED RELEASE ORAL 2 TIMES DAILY
COMMUNITY
Start: 2021-07-27 | End: 2022-06-24 | Stop reason: SDUPTHER

## 2022-06-24 RX ORDER — ALLOPURINOL 100 MG/1
2 TABLET ORAL DAILY
COMMUNITY
Start: 2021-12-20 | End: 2022-06-24 | Stop reason: SDUPTHER

## 2022-06-24 RX ORDER — HYDRALAZINE HYDROCHLORIDE 10 MG/1
1 TABLET, FILM COATED ORAL 2 TIMES DAILY
COMMUNITY
Start: 2021-08-16 | End: 2022-06-24 | Stop reason: SDUPTHER

## 2022-06-24 NOTE — TELEPHONE ENCOUNTER
----- Message from Ashley Elder sent at 6/24/2022  1:44 PM CDT -----  Regarding: Refills  Provider: DR PEREZ  [facility]:  [facility]  Last Visit:  Next Visit: 8/12/2022  Patient's Contact Number:    1. Name of Medication: Cartia  Dosage: 180 mg  Comments:    2. Name of Medication: Hydralazine HCL  Dosage: 10 mg  Comments:    3. Name of Medication: Allopurinol  Dosage: 100 mg  Comments    4. Name of Medication:   Dosage:  Comments:    5. Name of Medication:  Dosage:  Comments:

## 2022-08-08 RX ORDER — DILTIAZEM HYDROCHLORIDE 180 MG/1
180 CAPSULE, EXTENDED RELEASE ORAL 2 TIMES DAILY
Qty: 30 CAPSULE | Refills: 0 | OUTPATIENT
Start: 2022-08-08

## 2022-08-08 RX ORDER — ALLOPURINOL 100 MG/1
200 TABLET ORAL DAILY
Qty: 60 TABLET | Refills: 1 | Status: SHIPPED | OUTPATIENT
Start: 2022-08-08 | End: 2022-08-16 | Stop reason: SDUPTHER

## 2022-08-08 RX ORDER — DILTIAZEM HYDROCHLORIDE 180 MG/1
180 CAPSULE, EXTENDED RELEASE ORAL 2 TIMES DAILY
Qty: 60 CAPSULE | Refills: 1 | Status: SHIPPED | OUTPATIENT
Start: 2022-08-08 | End: 2022-08-16

## 2022-08-08 RX ORDER — ALLOPURINOL 100 MG/1
200 TABLET ORAL DAILY
Qty: 30 TABLET | Refills: 1 | OUTPATIENT
Start: 2022-08-08

## 2022-08-08 RX ORDER — HYDRALAZINE HYDROCHLORIDE 10 MG/1
10 TABLET, FILM COATED ORAL 2 TIMES DAILY
Qty: 60 TABLET | Refills: 2 | Status: SHIPPED | OUTPATIENT
Start: 2022-08-08 | End: 2022-08-16 | Stop reason: SDUPTHER

## 2022-08-08 RX ORDER — HYDRALAZINE HYDROCHLORIDE 10 MG/1
10 TABLET, FILM COATED ORAL 2 TIMES DAILY
Qty: 30 TABLET | Refills: 0 | OUTPATIENT
Start: 2022-08-08

## 2022-08-16 ENCOUNTER — OFFICE VISIT (OUTPATIENT)
Dept: FAMILY MEDICINE | Facility: CLINIC | Age: 52
End: 2022-08-16
Payer: COMMERCIAL

## 2022-08-16 VITALS
WEIGHT: 205 LBS | RESPIRATION RATE: 18 BRPM | HEART RATE: 65 BPM | OXYGEN SATURATION: 98 % | TEMPERATURE: 98 F | DIASTOLIC BLOOD PRESSURE: 89 MMHG | HEIGHT: 69 IN | BODY MASS INDEX: 30.36 KG/M2 | SYSTOLIC BLOOD PRESSURE: 145 MMHG

## 2022-08-16 DIAGNOSIS — E79.0 ELEVATED URIC ACID IN BLOOD: Primary | ICD-10-CM

## 2022-08-16 DIAGNOSIS — I10 PRIMARY HYPERTENSION: ICD-10-CM

## 2022-08-16 LAB — URATE SERPL-MCNC: 6 MG/DL (ref 2.6–6)

## 2022-08-16 PROCEDURE — 3077F PR MOST RECENT SYSTOLIC BLOOD PRESSURE >= 140 MM HG: ICD-10-PCS | Mod: CPTII,,, | Performed by: NURSE PRACTITIONER

## 2022-08-16 PROCEDURE — 99214 OFFICE O/P EST MOD 30 MIN: CPT | Mod: S$PBB,,, | Performed by: NURSE PRACTITIONER

## 2022-08-16 PROCEDURE — 99214 OFFICE O/P EST MOD 30 MIN: CPT | Mod: PBBFAC | Performed by: NURSE PRACTITIONER

## 2022-08-16 PROCEDURE — 1159F MED LIST DOCD IN RCRD: CPT | Mod: CPTII,,, | Performed by: NURSE PRACTITIONER

## 2022-08-16 PROCEDURE — 36415 COLL VENOUS BLD VENIPUNCTURE: CPT | Performed by: NURSE PRACTITIONER

## 2022-08-16 PROCEDURE — 84550 ASSAY OF BLOOD/URIC ACID: CPT | Performed by: NURSE PRACTITIONER

## 2022-08-16 PROCEDURE — 4010F ACE/ARB THERAPY RXD/TAKEN: CPT | Mod: CPTII,,, | Performed by: NURSE PRACTITIONER

## 2022-08-16 PROCEDURE — 1160F PR REVIEW ALL MEDS BY PRESCRIBER/CLIN PHARMACIST DOCUMENTED: ICD-10-PCS | Mod: CPTII,,, | Performed by: NURSE PRACTITIONER

## 2022-08-16 PROCEDURE — 3079F DIAST BP 80-89 MM HG: CPT | Mod: CPTII,,, | Performed by: NURSE PRACTITIONER

## 2022-08-16 PROCEDURE — 3077F SYST BP >= 140 MM HG: CPT | Mod: CPTII,,, | Performed by: NURSE PRACTITIONER

## 2022-08-16 PROCEDURE — 1159F PR MEDICATION LIST DOCUMENTED IN MEDICAL RECORD: ICD-10-PCS | Mod: CPTII,,, | Performed by: NURSE PRACTITIONER

## 2022-08-16 PROCEDURE — 4010F PR ACE/ARB THEARPY RXD/TAKEN: ICD-10-PCS | Mod: CPTII,,, | Performed by: NURSE PRACTITIONER

## 2022-08-16 PROCEDURE — 1160F RVW MEDS BY RX/DR IN RCRD: CPT | Mod: CPTII,,, | Performed by: NURSE PRACTITIONER

## 2022-08-16 PROCEDURE — 3008F BODY MASS INDEX DOCD: CPT | Mod: CPTII,,, | Performed by: NURSE PRACTITIONER

## 2022-08-16 PROCEDURE — 3079F PR MOST RECENT DIASTOLIC BLOOD PRESSURE 80-89 MM HG: ICD-10-PCS | Mod: CPTII,,, | Performed by: NURSE PRACTITIONER

## 2022-08-16 PROCEDURE — 99214 PR OFFICE/OUTPT VISIT, EST, LEVL IV, 30-39 MIN: ICD-10-PCS | Mod: S$PBB,,, | Performed by: NURSE PRACTITIONER

## 2022-08-16 PROCEDURE — 3008F PR BODY MASS INDEX (BMI) DOCUMENTED: ICD-10-PCS | Mod: CPTII,,, | Performed by: NURSE PRACTITIONER

## 2022-08-16 RX ORDER — LOSARTAN POTASSIUM 50 MG/1
50 TABLET ORAL 2 TIMES DAILY
Qty: 60 TABLET | Refills: 2 | Status: SHIPPED | OUTPATIENT
Start: 2022-08-16 | End: 2023-01-25

## 2022-08-16 RX ORDER — TIZANIDINE 4 MG/1
1 TABLET ORAL NIGHTLY
COMMUNITY
Start: 2022-01-27 | End: 2022-08-16

## 2022-08-16 RX ORDER — CLONIDINE HYDROCHLORIDE 0.1 MG/1
0.1 TABLET ORAL NIGHTLY
Qty: 90 TABLET | Refills: 0 | Status: SHIPPED | OUTPATIENT
Start: 2022-08-16 | End: 2023-02-08 | Stop reason: SDUPTHER

## 2022-08-16 RX ORDER — HYDRALAZINE HYDROCHLORIDE 10 MG/1
10 TABLET, FILM COATED ORAL 2 TIMES DAILY
Qty: 60 TABLET | Refills: 2 | Status: SHIPPED | OUTPATIENT
Start: 2022-08-16 | End: 2023-03-23

## 2022-08-16 RX ORDER — ALLOPURINOL 100 MG/1
200 TABLET ORAL DAILY
Qty: 60 TABLET | Refills: 3 | Status: SHIPPED | OUTPATIENT
Start: 2022-08-16 | End: 2023-03-23

## 2022-08-16 RX ORDER — LOSARTAN POTASSIUM 50 MG/1
1 TABLET ORAL 2 TIMES DAILY
COMMUNITY
Start: 2021-12-20 | End: 2022-08-16 | Stop reason: SDUPTHER

## 2022-08-16 RX ORDER — AMLODIPINE BESYLATE 10 MG/1
10 TABLET ORAL DAILY
Qty: 90 TABLET | Refills: 3 | Status: SHIPPED | OUTPATIENT
Start: 2022-08-16 | End: 2023-04-05 | Stop reason: SDUPTHER

## 2022-08-16 RX ORDER — CLONIDINE HYDROCHLORIDE 0.1 MG/1
1 TABLET ORAL 2 TIMES DAILY
COMMUNITY
Start: 2021-10-03 | End: 2022-08-16 | Stop reason: SDUPTHER

## 2022-08-16 RX ORDER — AMLODIPINE BESYLATE 10 MG/1
10 TABLET ORAL DAILY
COMMUNITY
Start: 2022-07-07 | End: 2022-08-16 | Stop reason: SDUPTHER

## 2022-08-16 NOTE — PROGRESS NOTES
Patient Name: Fe James   : 1970  MRN: [id number]     SUBJECTIVE:  Fe James is a 52 y.o. female here for Medication Refill and Foot Pain  .    52-year-old female presents to the clinic requesting medications and refill for her hypertension and gout.  And also reporte continuous left dorsal foot pain.  Patient states she is complying with her medications.  Patient state her blood pressure has been fluctuating.  Blood pressure log given to patient and will bring  with her in 4 weeks to clinic.  Regarding left foot pain patient has been seen multiple times regarding the issue, last uric acid level greater than 9 on 2020.  Uric acid level drawn in the clinic.  Patient currently on allopurinol 100 mg p.o. twice a day.    Patient currently not on any cholesterol lowering medication.    After reviewing patient chart history, patient has shown not to be compliant with medications nor clinic visits or cardiology and nephrology visits.     Patient to return in 4 weeks for wellness and fasting blood work , blood pressure log, nephrology referral as well as Cardiology.  Patient verbalized and agreed to plan of care.      2021 clinic visit was Dr. VALLE.    Patient is a 51year old Female with a  past medical history of HTN, CKD 3,  new onset cardiac murmur (19), and LBBB. Patient states that she is adhering to a low fat, low sodium, and low carbohydrate eating  HTN: Pt states her mail in pharmacy took her off her diltiazem since 2021.  Patient states that she recently restarted her diltiazem.  Currently on amlodipine 10 mg po daily,  losartan 50 mg po BID and hydralazine 10 mg po BID.  She is also on furosemide prn for peripheral edema.  Cardiac murmur/ LBBB: LVEF 50%. [facility] referral has been submitted months ago. Pt has intermittent VILLASENOR and when talking at times.  CKD3:  GFR (2021) 43 with BUN 27.4/ 1.6 Followed by [facility].  Left calf pain claudication vs suspected  "sciatica: Pt states she has had issues with constant sharp then aching pain in her calves after walking at work all day. Pain eases up when not ambulating but still has aching, burning pain at rest. Pt admits that calf pain is burning pain which patient states is the same pain elicited by straight leg raise test during the examination.  .  Right   sciatica: pt states sharp intermittent pain from buttock into rt hip with numbness and tingling into the the top of her foot.        12/15/2021 US lower extremity, venous insufficiency by Dr. Audi Bowden.    The left leg was evaluated for venous reflux in the supine and standing position.  A limited evaluation of the interrogated deep veins did not reveal a DVT.  No substantial reflux was identified in the interrogated portions of the left leg deep system or GSV. The SSV was not  definitively identified.        ALLERGIES: Review of patient's allergies indicates:  No Known Allergies      ROS:  Review of Systems   Musculoskeletal:        Left foot dorsal aspect pain   All other systems reviewed and are negative.        OBJECTIVE:  Vital signs  Vitals:    08/16/22 1346   BP: (!) 145/89   Pulse: 65   Resp: 18   Temp: 97.7 °F (36.5 °C)   TempSrc: Oral   SpO2: 98%   Weight: 93 kg (205 lb)   Height: 5' 9" (1.753 m)      Body mass index is 30.27 kg/m².    PHYSICAL EXAM:   Physical Exam  Vitals and nursing note reviewed.   Constitutional:       General: She is not in acute distress.     Appearance: Normal appearance. She is not ill-appearing, toxic-appearing or diaphoretic.   HENT:      Head: Normocephalic and atraumatic.   Eyes:      Extraocular Movements: Extraocular movements intact.      Pupils: Pupils are equal, round, and reactive to light.   Cardiovascular:      Rate and Rhythm: Normal rate and regular rhythm.      Pulses: Normal pulses.           Radial pulses are 2+ on the right side and 2+ on the left side.        Dorsalis pedis pulses are 2+ on the right side " and 2+ on the left side.        Posterior tibial pulses are 2+ on the right side and 2+ on the left side.      Heart sounds: Normal heart sounds.   Pulmonary:      Effort: Pulmonary effort is normal.      Breath sounds: Normal breath sounds. No wheezing or rhonchi.   Abdominal:      Palpations: Abdomen is soft.      Tenderness: There is no abdominal tenderness.   Musculoskeletal:         General: Tenderness (Left dorsal  with palpation, no crepitus, no deformity, no step-off.  No erythema, no wounds, no bleeding.) present. Normal range of motion.      Cervical back: Normal range of motion and neck supple. No rigidity or tenderness.      Right lower leg: No edema.      Left lower leg: No edema.   Lymphadenopathy:      Cervical: No cervical adenopathy.   Skin:     General: Skin is warm.      Capillary Refill: Capillary refill takes less than 2 seconds.      Findings: No bruising, erythema, lesion or rash.   Neurological:      General: No focal deficit present.      Mental Status: She is alert and oriented to person, place, and time. Mental status is at baseline.   Psychiatric:         Mood and Affect: Mood normal.         Behavior: Behavior normal.         Thought Content: Thought content normal.         Judgment: Judgment normal.          ASSESSMENT/PLAN:  1. Primary hypertension  Assessment & Plan:  Blood pressure medications refill initiated in the clinic.  Continue to follow low-sodium diet 2 g a day.  Blood pressure goal less than 130/80, record blood pressure at home every day and bring log to next office visit.  Exercise at least 30 minutes a day, 5 days a week as tolerated.  Maintain healthy weight.  Do not smoke.  Stay well hydrated with fluids specially water.  Do not take NSAIDs such as ibuprofen, naproxen, Aleve, Advil, Toradol, Mobic.  May take only Tylenol as needed for pain or headaches.      Orders:  -     amLODIPine (NORVASC) 10 MG tablet  -     cloNIDine (CATAPRES) 0.1 MG tablet  -      hydrALAZINE (APRESOLINE) 10 MG tablet  -     losartan (COZAAR) 50 MG tablet    2. Elevated uric acid in blood  Assessment & Plan:  Uric acid levels drawn in the lab, results pending, will notify patient of results and adjust medications as needed.  Continue allopurinol 100 mg p.o. twice a day.  Purine diet discussed.  Stay hydrated with fluids specially water.  Patient will return to clinic in 4 weeks for follow-up.    Orders:  -     Uric Acid         RESULTS:  Recent Results (from the past 1008 hour(s))   Uric Acid    Collection Time: 08/16/22  2:20 PM   Result Value Ref Range    Uric Acid 6.0 2.6 - 6.0 mg/dL         Follow Up:  Follow up in about 4 weeks (around 9/13/2022).      Previous medical history/lab work/radiology reviewed and considered during medical management decisions.   Medication list reviewed and medication reconciliation performed.  Patient was provided  and care about his/her current diagnosis (es) and medications including risk/benefit and side effects/adverse events, over the counter medication uses/doses, home self-care and contact precautions,  and red flags and indications for when to seek immediate medical attention.   Patient was advised to continue compliance with current medication list and medical recommendations.  Patient dvised continued compliance with recommended eating habits/ diets for medical conditions and exercise 150 minutes/ week (if possible) for medical condition (s).  Educational handouts and instructions on selected disease management in AVS (After Visit Summary).    All of the patient's questions were answered to patient's satisfaction.   The patient was receptive, expressed verbal understanding and agreement the above plan.        This note was created with the assistance of a voice recognition software or phone dictation. There may be transcription errors as a result of using this technology however minimal. Effort has been made to assure accuracy of  transcription but any obvious errors or omissions should be clarified with the author of the document

## 2022-08-16 NOTE — PROGRESS NOTES
PLEASE CALL PATIENTS WITH RESULTS, uric acid within normal limits below 7.  Continue allopurinol as directed.  Allopurinol medication refill has been sent To your preferred pharmacy.  Follow Purine diet.  See you in 4 weeks with fasting blood work.

## 2022-08-16 NOTE — ASSESSMENT & PLAN NOTE
Uric acid levels drawn in the lab, results pending, will notify patient of results and adjust medications as needed.  Continue allopurinol 100 mg p.o. twice a day.  Purine diet discussed.  Stay hydrated with fluids specially water.  Patient will return to clinic in 4 weeks for follow-up.

## 2022-08-16 NOTE — ASSESSMENT & PLAN NOTE
Blood pressure medications refill initiated in the clinic.  Continue to follow low-sodium diet 2 g a day.  Blood pressure goal less than 130/80, record blood pressure at home every day and bring log to next office visit.  Exercise at least 30 minutes a day, 5 days a week as tolerated.  Maintain healthy weight.  Do not smoke.  Stay well hydrated with fluids specially water.  Do not take NSAIDs such as ibuprofen, naproxen, Aleve, Advil, Toradol, Mobic.  May take only Tylenol as needed for pain or headaches.

## 2022-08-17 ENCOUNTER — TELEPHONE (OUTPATIENT)
Dept: FAMILY MEDICINE | Facility: CLINIC | Age: 52
End: 2022-08-17
Payer: COMMERCIAL

## 2022-08-17 NOTE — TELEPHONE ENCOUNTER
Spoke to patient regarding this information. Patient verbalized understanding. No additional questions at this time.        ----- Message from JOY Graves sent at 8/16/2022  3:24 PM CDT -----  PLEASE CALL PATIENTS WITH RESULTS, uric acid within normal limits below 7.  Continue allopurinol as directed.  Allopurinol medication refill has been sent To your preferred pharmacy.  Follow Purine diet.  See you in 4 weeks with fasting blood work.

## 2022-10-05 ENCOUNTER — OFFICE VISIT (OUTPATIENT)
Dept: FAMILY MEDICINE | Facility: CLINIC | Age: 52
End: 2022-10-05
Payer: COMMERCIAL

## 2022-10-05 VITALS
OXYGEN SATURATION: 98 % | SYSTOLIC BLOOD PRESSURE: 132 MMHG | TEMPERATURE: 98 F | BODY MASS INDEX: 30.66 KG/M2 | WEIGHT: 207 LBS | HEIGHT: 69 IN | DIASTOLIC BLOOD PRESSURE: 89 MMHG | HEART RATE: 70 BPM | RESPIRATION RATE: 18 BRPM

## 2022-10-05 DIAGNOSIS — Z00.00 WELLNESS EXAMINATION: Primary | ICD-10-CM

## 2022-10-05 LAB
ALBUMIN SERPL-MCNC: 4 GM/DL (ref 3.5–5)
ALBUMIN/GLOB SERPL: 1.2 RATIO (ref 1.1–2)
ALP SERPL-CCNC: 117 UNIT/L (ref 40–150)
ALT SERPL-CCNC: 15 UNIT/L (ref 0–55)
AST SERPL-CCNC: 20 UNIT/L (ref 5–34)
BILIRUB UR QL STRIP: NEGATIVE
BILIRUBIN DIRECT+TOT PNL SERPL-MCNC: 0.5 MG/DL
BUN SERPL-MCNC: 29.7 MG/DL (ref 9.8–20.1)
CALCIUM SERPL-MCNC: 9.7 MG/DL (ref 8.4–10.2)
CHLORIDE SERPL-SCNC: 108 MMOL/L (ref 98–107)
CHOLEST SERPL-MCNC: 163 MG/DL
CHOLEST/HDLC SERPL: 4 {RATIO} (ref 0–5)
CO2 SERPL-SCNC: 26 MMOL/L (ref 22–29)
CREAT SERPL-MCNC: 1.61 MG/DL (ref 0.55–1.02)
CREAT UR-MCNC: 101 MG/DL (ref 47–110)
ERYTHROCYTE [DISTWIDTH] IN BLOOD BY AUTOMATED COUNT: 13.1 % (ref 11.5–17)
EST. AVERAGE GLUCOSE BLD GHB EST-MCNC: 102.5 MG/DL
GFR SERPLBLD CREATININE-BSD FMLA CKD-EPI: 38 MLS/MIN/1.73/M2
GLOBULIN SER-MCNC: 3.4 GM/DL (ref 2.4–3.5)
GLUCOSE SERPL-MCNC: 82 MG/DL (ref 74–100)
GLUCOSE UR QL STRIP: NEGATIVE
HBA1C MFR BLD: 5.2 %
HCT VFR BLD AUTO: 46.5 % (ref 37–47)
HDLC SERPL-MCNC: 43 MG/DL (ref 35–60)
HGB BLD-MCNC: 14.3 GM/DL (ref 12–16)
KETONES UR QL STRIP: NEGATIVE
LDLC SERPL CALC-MCNC: 97 MG/DL (ref 50–140)
LEUKOCYTE ESTERASE UR QL STRIP: NEGATIVE
MCH RBC QN AUTO: 26.9 PG (ref 27–31)
MCHC RBC AUTO-ENTMCNC: 30.8 MG/DL (ref 33–36)
MCV RBC AUTO: 87.4 FL (ref 80–94)
MICROALBUMIN UR-MCNC: 474.6 UG/ML
MICROALBUMIN/CREAT RATIO PNL UR: 469.9 MG/GM CR (ref 0–30)
NRBC BLD AUTO-RTO: 0 %
PH, POC UA: 5.5
PLATELET # BLD AUTO: 286 X10(3)/MCL (ref 130–400)
PMV BLD AUTO: 11.2 FL (ref 7.4–10.4)
POC BLOOD, URINE: NEGATIVE
POC NITRATES, URINE: NEGATIVE
POTASSIUM SERPL-SCNC: 4.4 MMOL/L (ref 3.5–5.1)
PROT SERPL-MCNC: 7.4 GM/DL (ref 6.4–8.3)
PROT UR QL STRIP: POSITIVE
RBC # BLD AUTO: 5.32 X10(6)/MCL (ref 4.2–5.4)
SODIUM SERPL-SCNC: 143 MMOL/L (ref 136–145)
SP GR UR STRIP: 1.01 (ref 1–1.03)
TRIGL SERPL-MCNC: 117 MG/DL (ref 37–140)
TSH SERPL-ACNC: 1.58 UIU/ML (ref 0.35–4.94)
UROBILINOGEN UR STRIP-ACNC: 0.2 (ref 0.1–1.1)
VLDLC SERPL CALC-MCNC: 23 MG/DL
WBC # SPEC AUTO: 4.7 X10(3)/MCL (ref 4.5–11.5)

## 2022-10-05 PROCEDURE — 3079F DIAST BP 80-89 MM HG: CPT | Mod: CPTII,,, | Performed by: NURSE PRACTITIONER

## 2022-10-05 PROCEDURE — 84443 ASSAY THYROID STIM HORMONE: CPT | Performed by: NURSE PRACTITIONER

## 2022-10-05 PROCEDURE — 3075F SYST BP GE 130 - 139MM HG: CPT | Mod: CPTII,,, | Performed by: NURSE PRACTITIONER

## 2022-10-05 PROCEDURE — 4010F ACE/ARB THERAPY RXD/TAKEN: CPT | Mod: CPTII,,, | Performed by: NURSE PRACTITIONER

## 2022-10-05 PROCEDURE — 80053 COMPREHEN METABOLIC PANEL: CPT | Performed by: NURSE PRACTITIONER

## 2022-10-05 PROCEDURE — 1159F PR MEDICATION LIST DOCUMENTED IN MEDICAL RECORD: ICD-10-PCS | Mod: CPTII,,, | Performed by: NURSE PRACTITIONER

## 2022-10-05 PROCEDURE — 99396 PREV VISIT EST AGE 40-64: CPT | Mod: S$PBB,,, | Performed by: NURSE PRACTITIONER

## 2022-10-05 PROCEDURE — 99215 OFFICE O/P EST HI 40 MIN: CPT | Mod: PBBFAC | Performed by: NURSE PRACTITIONER

## 2022-10-05 PROCEDURE — 3008F BODY MASS INDEX DOCD: CPT | Mod: CPTII,,, | Performed by: NURSE PRACTITIONER

## 2022-10-05 PROCEDURE — 82043 UR ALBUMIN QUANTITATIVE: CPT | Performed by: NURSE PRACTITIONER

## 2022-10-05 PROCEDURE — 3066F PR DOCUMENTATION OF TREATMENT FOR NEPHROPATHY: ICD-10-PCS | Mod: CPTII,,, | Performed by: NURSE PRACTITIONER

## 2022-10-05 PROCEDURE — 36415 COLL VENOUS BLD VENIPUNCTURE: CPT | Performed by: NURSE PRACTITIONER

## 2022-10-05 PROCEDURE — 81003 URINALYSIS AUTO W/O SCOPE: CPT | Mod: PBBFAC | Performed by: NURSE PRACTITIONER

## 2022-10-05 PROCEDURE — 83036 HEMOGLOBIN GLYCOSYLATED A1C: CPT | Performed by: NURSE PRACTITIONER

## 2022-10-05 PROCEDURE — 3008F PR BODY MASS INDEX (BMI) DOCUMENTED: ICD-10-PCS | Mod: CPTII,,, | Performed by: NURSE PRACTITIONER

## 2022-10-05 PROCEDURE — 3062F POS MACROALBUMINURIA REV: CPT | Mod: CPTII,,, | Performed by: NURSE PRACTITIONER

## 2022-10-05 PROCEDURE — 4010F PR ACE/ARB THEARPY RXD/TAKEN: ICD-10-PCS | Mod: CPTII,,, | Performed by: NURSE PRACTITIONER

## 2022-10-05 PROCEDURE — 3075F PR MOST RECENT SYSTOLIC BLOOD PRESS GE 130-139MM HG: ICD-10-PCS | Mod: CPTII,,, | Performed by: NURSE PRACTITIONER

## 2022-10-05 PROCEDURE — 3062F PR POS MACROALBUMINURIA RESULT DOCUMENTED/REVIEW: ICD-10-PCS | Mod: CPTII,,, | Performed by: NURSE PRACTITIONER

## 2022-10-05 PROCEDURE — 85027 COMPLETE CBC AUTOMATED: CPT | Performed by: NURSE PRACTITIONER

## 2022-10-05 PROCEDURE — 1160F RVW MEDS BY RX/DR IN RCRD: CPT | Mod: CPTII,,, | Performed by: NURSE PRACTITIONER

## 2022-10-05 PROCEDURE — 80061 LIPID PANEL: CPT | Performed by: NURSE PRACTITIONER

## 2022-10-05 PROCEDURE — 3079F PR MOST RECENT DIASTOLIC BLOOD PRESSURE 80-89 MM HG: ICD-10-PCS | Mod: CPTII,,, | Performed by: NURSE PRACTITIONER

## 2022-10-05 PROCEDURE — 3066F NEPHROPATHY DOC TX: CPT | Mod: CPTII,,, | Performed by: NURSE PRACTITIONER

## 2022-10-05 PROCEDURE — 1159F MED LIST DOCD IN RCRD: CPT | Mod: CPTII,,, | Performed by: NURSE PRACTITIONER

## 2022-10-05 PROCEDURE — 99396 PR PREVENTIVE VISIT,EST,40-64: ICD-10-PCS | Mod: S$PBB,,, | Performed by: NURSE PRACTITIONER

## 2022-10-05 PROCEDURE — 1160F PR REVIEW ALL MEDS BY PRESCRIBER/CLIN PHARMACIST DOCUMENTED: ICD-10-PCS | Mod: CPTII,,, | Performed by: NURSE PRACTITIONER

## 2022-10-05 NOTE — ASSESSMENT & PLAN NOTE
Wellness exam initiated.  Will notify patient regarding blood work results.  Urgent Referral to Nephrology due to history of CKD stage 3.  And abnormal lab results.  Will send patient for our patient EKG with cardiac ultrasound, murmur noted during exam.  Instructed patient to stay hydrated with fluids and take medications as prescribed.  Stay active, lifestyle modification discussed.  Patient return to clinic in 6 months or sooner if needed.  Patient verbalized understanding and agreed to plan of care.

## 2022-10-05 NOTE — PROGRESS NOTES
Patient Name: Fe James   : 1970  MRN: [id number]     SUBJECTIVE:  Fe James is a 52 y.o. female here for Wellness  .  52-year-old female presents to clinic for wellness exam with fasting blood work.  Past medical history of HTN, CKD 3.  Patient state she has a history of murmur and kidney issues and she is supposed to be referred but no follow-up regarding these 2 issues.  Patient denies any complaints at this time.    Hypertension:  Patient state she is current on her blood pressure medication and she takes it regularly without any issues.  Denies any need for refills at this time.  Patient will call clinic for refills of needed.    Social Determinants of Health  Tobacco Use: Low Risk       Smoking Tobacco Use: Never      Smokeless Tobacco Use: Never      Passive Exposure: Not on file  Alcohol Use: Not At Risk      Frequency of Alcohol Consumption: Never      Average Number of Drinks: Patient does not drink      Frequency of Binge Drinking: Never  Financial Resource Strain: Low Risk       Difficulty of Paying Living Expenses: Not hard at all  Food Insecurity: No Food Insecurity      Worried About Running Out of Food in the Last Year: Never true      Ran Out of Food in the Last Year: Never true  Transportation Needs: No Transportation Needs      Lack of Transportation (Medical): No      Lack of Transportation (Non-Medical): No  Physical Activity: Inactive      Days of Exercise per Week: 0 days      Minutes of Exercise per Session: 0 min  Stress: No Stress Concern Present      Feeling of Stress : Not at all  Social Connections: Moderately Integrated      Frequency of Communication with Friends and Family: Twice a week      Frequency of Social Gatherings with Friends and Family: Once a week      Attends [employer] Services: More than 4 times per year      Active Member of Clubs or Organizations: Yes      Attends Club or Organization Meetings: Never      Marital Status:   Housing Stability: Low  Risk       Unable to Pay for Housing in the Last Year: No      Number of Places Lived in the Last Year: 1      Unstable Housing in the Last Year: No  Depression PHQ-4: Low Risk       Last PHQ Score: 0   Water:  Discussed with patient to stay hydrated with fluids, drinks 6-8 oz of water daily.  Caffeine:  Patient not much of a caffeine drinker, she might drink a Coke every once in a while.  Sleep:  Patient state she gets around 6-8 hours of sleep.  Exercise:  Patient does not follow an exercise regiment.  Instructed patient to start exercising at least 30 minutes a day up to 5 days a week as tolerated, to eat more fresh fruits and vegetables, follow a low-cholesterol, low-fat, low-salt diet.  Eat more organic chicken, turkey, fish, limit red meat to once a week.  Gun safety:  Patient keep firearm in a secure and safe place at home.  Car safety:  Always wears seatbelt.  Vision exam: UD 20/40, OD 20/30, OS 20/30 WITH CORRECTIVE LENSES.  Instructed patient to make a follow-up appointment with her ophthalmologist for annual checkup.  Dental exam:  Patient wearing braces, patient stated she is up-to-date with her dental appointments.  Care gaps:  Cervical cancer screening referral initiated, colorectal cancer screening referral initiated, mammogram for breast cancer screening initiated.  Patient declines immunization.  Cardiology:  Murmur noted during exam, cardiology referral initiated.  Nephrology:  Patient has history of CKD 3 but never followed up with a nephrologist.  Nephrology referral initiated.  BUN 29.7, creatinine 1.61, microalbumin 474.6, microalbumin/ creatinine ratio 469.9.    Patient denies chest pain, shortness of breath, dyspnea on exertion, palpitations, peripheral edema, abdominal pain, nausea, vomiting, diarrhea, constipation, fatigue, fever, chills, dysuria,  hematuria, melena, or hematochezia.             ALLERGIES: Review of patient's allergies indicates:  No Known Allergies      ROS:  Review of  "Systems   All other systems reviewed and are negative.      OBJECTIVE:  Vital signs  Vitals:    10/05/22 1330   BP: 132/89   Pulse: 70   Resp: 18   Temp: 97.9 °F (36.6 °C)   TempSrc: Oral   SpO2: 98%   Weight: 93.9 kg (207 lb)   Height: 5' 9" (1.753 m)      Body mass index is 30.57 kg/m².    PHYSICAL EXAM:   Physical Exam  Vitals and nursing note reviewed.   Constitutional:       General: She is awake. She is not in acute distress.     Appearance: Normal appearance. She is well-developed, well-groomed and overweight. She is not ill-appearing, toxic-appearing or diaphoretic.   HENT:      Head: Normocephalic and atraumatic.      Right Ear: Tympanic membrane, ear canal and external ear normal. There is no impacted cerumen.      Left Ear: Tympanic membrane, ear canal and external ear normal. There is no impacted cerumen.      Nose: Nose normal. No congestion or rhinorrhea.      Mouth/Throat:      Mouth: Mucous membranes are moist.      Pharynx: Oropharynx is clear. No oropharyngeal exudate or posterior oropharyngeal erythema.      Comments: Brace is noted, uvula midline.  Eyes:      General: Lids are normal. Gaze aligned appropriately.      Extraocular Movements: Extraocular movements intact.      Conjunctiva/sclera: Conjunctivae normal.      Pupils: Pupils are equal, round, and reactive to light.      Comments: Wearing corrective  UD 20/40  OD 20/30  OS 20/30   Neck:      Thyroid: No thyroid mass, thyromegaly or thyroid tenderness.      Vascular: No carotid bruit.      Trachea: Trachea normal.   Cardiovascular:      Rate and Rhythm: Normal rate and regular rhythm.      Pulses: Normal pulses.           Carotid pulses are 2+ on the right side and 2+ on the left side.       Radial pulses are 2+ on the right side and 2+ on the left side.        Femoral pulses are 2+ on the right side and 2+ on the left side.       Dorsalis pedis pulses are 2+ on the right side and 2+ on the left side.        Posterior tibial pulses are 2+ " on the right side and 2+ on the left side.      Heart sounds: Murmur heard.   Pulmonary:      Effort: Pulmonary effort is normal. No respiratory distress.      Breath sounds: Normal breath sounds and air entry. No wheezing or rhonchi.   Chest:      Chest wall: No tenderness.   Abdominal:      General: Bowel sounds are normal. There is no distension.      Palpations: Abdomen is soft. There is no mass.      Tenderness: There is no abdominal tenderness. There is no right CVA tenderness, left CVA tenderness, guarding or rebound. Negative signs include Hayes's sign, Rovsing's sign, McBurney's sign and obturator sign.      Hernia: No hernia is present.   Genitourinary:     Comments: Pap and mammogram referral initiated.  Exam deferred.  Musculoskeletal:         General: Normal range of motion.      Cervical back: Normal range of motion and neck supple. No rigidity or tenderness.      Right lower leg: No edema.      Left lower leg: No edema.      Right foot: Normal range of motion. No deformity or bunion.      Left foot: Normal range of motion. No deformity or bunion.   Feet:      Right foot:      Protective Sensation: 9 sites tested.  9 sites sensed.      Skin integrity: Skin integrity normal.      Toenail Condition: Right toenails are normal.      Left foot:      Protective Sensation: 9 sites tested.  9 sites sensed.      Skin integrity: Skin integrity normal.      Toenail Condition: Left toenails are normal.      Comments: Discussed foot care.  Wearing appropriate shoes.  Lymphadenopathy:      Head:      Right side of head: No submandibular or tonsillar adenopathy.      Left side of head: No submandibular or tonsillar adenopathy.      Cervical: No cervical adenopathy.      Right cervical: No superficial cervical adenopathy.     Left cervical: No superficial cervical adenopathy.      Upper Body:      Right upper body: No supraclavicular adenopathy.      Left upper body: No supraclavicular adenopathy.   Skin:      General: Skin is warm.      Capillary Refill: Capillary refill takes less than 2 seconds.      Findings: No rash.      Nails: There is no clubbing.   Neurological:      General: No focal deficit present.      Mental Status: She is alert and oriented to person, place, and time. Mental status is at baseline.      GCS: GCS eye subscore is 4. GCS verbal subscore is 5. GCS motor subscore is 6.      Cranial Nerves: Cranial nerves 2-12 are intact.      Sensory: Sensation is intact.      Motor: Motor function is intact.      Coordination: Coordination is intact.      Gait: Gait is intact.      Deep Tendon Reflexes:      Reflex Scores:       Tricep reflexes are 2+ on the right side and 2+ on the left side.       Bicep reflexes are 2+ on the right side and 2+ on the left side.       Brachioradialis reflexes are 2+ on the right side and 2+ on the left side.       Patellar reflexes are 2+ on the right side and 2+ on the left side.       Achilles reflexes are 2+ on the right side and 2+ on the left side.  Psychiatric:         Attention and Perception: Attention and perception normal.         Mood and Affect: Mood and affect normal.         Speech: Speech normal.         Behavior: Behavior normal. Behavior is cooperative.         Thought Content: Thought content normal.         Cognition and Memory: Cognition and memory normal.         Judgment: Judgment normal.        ASSESSMENT/PLAN:  1. Wellness examination  Assessment & Plan:  Wellness exam initiated.  Will notify patient regarding blood work results.  Urgent Referral to Nephrology due to history of CKD stage 3.  And abnormal lab results.  Will send patient for our patient EKG with cardiac ultrasound, murmur noted during exam.  Instructed patient to stay hydrated with fluids and take medications as prescribed.  Stay active, lifestyle modification discussed.  Patient return to clinic in 6 months or sooner if needed.  Patient verbalized understanding and agreed to plan of  care.    Orders:  -     CBC Without Differential  -     Comprehensive Metabolic Panel  -     TSH  -     Lipid Panel  -     Hemoglobin A1C  -     POCT Urinalysis, Dipstick, Automated, W/O Scope  -     Microalbumin/Creatinine Ratio, Urine  -     Ambulatory referral/consult to Gynecology; Future; Expected date: 10/12/2022  -     Mammo Digital Screening Bilat w/ Baldomero; Future; Expected date: 10/05/2022  -     Foot Exam Performed  -     Ambulatory referral/consult to Nephrology; Future; Expected date: 10/12/2022         RESULTS:  Recent Results (from the past 1008 hour(s))   CBC Without Differential    Collection Time: 10/05/22  2:16 PM   Result Value Ref Range    WBC 4.7 4.5 - 11.5 x10(3)/mcL    RBC 5.32 4.20 - 5.40 x10(6)/mcL    Hgb 14.3 12.0 - 16.0 gm/dL    Hct 46.5 37.0 - 47.0 %    Platelet 286 130 - 400 x10(3)/mcL    MCV 87.4 80.0 - 94.0 fL    MCH 26.9 (L) 27.0 - 31.0 pg    MCHC 30.8 (L) 33.0 - 36.0 mg/dL    RDW 13.1 11.5 - 17.0 %    MPV 11.2 (H) 7.4 - 10.4 fL    NRBC% 0.0 %   Comprehensive Metabolic Panel    Collection Time: 10/05/22  2:16 PM   Result Value Ref Range    Sodium Level 143 136 - 145 mmol/L    Potassium Level 4.4 3.5 - 5.1 mmol/L    Chloride 108 (H) 98 - 107 mmol/L    Carbon Dioxide 26 22 - 29 mmol/L    Glucose Level 82 74 - 100 mg/dL    Blood Urea Nitrogen 29.7 (H) 9.8 - 20.1 mg/dL    Creatinine 1.61 (H) 0.55 - 1.02 mg/dL    Calcium Level Total 9.7 8.4 - 10.2 mg/dL    Protein Total 7.4 6.4 - 8.3 gm/dL    Albumin Level 4.0 3.5 - 5.0 gm/dL    Globulin 3.4 2.4 - 3.5 gm/dL    Albumin/Globulin Ratio 1.2 1.1 - 2.0 ratio    Bilirubin Total 0.5 <=1.5 mg/dL    Alkaline Phosphatase 117 40 - 150 unit/L    Alanine Aminotransferase 15 0 - 55 unit/L    Aspartate Aminotransferase 20 5 - 34 unit/L    eGFR 38 mls/min/1.73/m2   TSH    Collection Time: 10/05/22  2:16 PM   Result Value Ref Range    Thyroid Stimulating Hormone 1.5820 0.3500 - 4.9400 uIU/mL   Lipid Panel    Collection Time: 10/05/22  2:16 PM   Result Value  Ref Range    Cholesterol Total 163 <=200 mg/dL    HDL Cholesterol 43 35 - 60 mg/dL    Triglyceride 117 37 - 140 mg/dL    Cholesterol/HDL Ratio 4 0 - 5    Very Low Density Lipoprotein 23     LDL Cholesterol 97.00 50.00 - 140.00 mg/dL   Hemoglobin A1C    Collection Time: 10/05/22  2:16 PM   Result Value Ref Range    Hemoglobin A1c 5.2 <=7.0 %    Estimated Average Glucose 102.5 mg/dL   Microalbumin/Creatinine Ratio, Urine    Collection Time: 10/05/22  2:16 PM   Result Value Ref Range    Urine Microalbumin 474.6 (H) <=30.0 ug/ml    Urine Creatinine 101.0 47.0 - 110.0 mg/dL    Microalbumin Creatinine Ratio 469.9 (H) 0.0 - 30.0 mg/gm Cr   POCT Urinalysis, Dipstick, Automated, W/O Scope    Collection Time: 10/05/22  2:52 PM   Result Value Ref Range    POC Blood, Urine Negative Negative    POC Bilirubin, Urine Negative Negative    POC Urobilinogen, Urine 0.2 0.1 - 1.1    POC Ketones, Urine Negative Negative    POC Protein, Urine Positive (A) Negative    POC Nitrates, Urine Negative Negative    POC Glucose, Urine Negative Negative    pH, UA 5.5     POC Specific Gravity, Urine 1.015 1.003 - 1.029    POC Leukocytes, Urine Negative Negative         Follow Up:  Follow up in about 6 months (around 4/5/2023).      Previous medical history/lab work/radiology reviewed and considered during medical management decisions.   Medication list reviewed and medication reconciliation performed.  Patient was provided  and care about his/her current diagnosis (es) and medications including risk/benefit and side effects/adverse events, over the counter medication uses/doses, home self-care and contact precautions,  and red flags and indications for when to seek immediate medical attention.   Patient was advised to continue compliance with current medication list and medical recommendations.  Patient dvised continued compliance with recommended eating habits/ diets for medical conditions and exercise 150 minutes/ week (if possible) for  medical condition (s).  Educational handouts and instructions on selected disease management in AVS (After Visit Summary).    All of the patient's questions were answered to patient's satisfaction.   The patient was receptive, expressed verbal understanding and agreement the above plan.     This note was created with the assistance of a voice recognition software or phone dictation. There may be transcription errors as a result of using this technology however minimal. Effort has been made to assure accuracy of transcription but any obvious errors or omissions should be clarified with the author of the document

## 2022-10-06 ENCOUNTER — TELEPHONE (OUTPATIENT)
Dept: FAMILY MEDICINE | Facility: CLINIC | Age: 52
End: 2022-10-06
Payer: COMMERCIAL

## 2022-10-06 DIAGNOSIS — R01.1 MURMUR: Primary | ICD-10-CM

## 2022-10-06 NOTE — TELEPHONE ENCOUNTER
----- Message from JOY Graves sent at 10/6/2022 11:55 AM CDT -----  Notify patient her kidney functions are elevated, stay hydrated with fluids specially water, nephrology referral has been initiated.  Regarding her diagnosis of murmur, instructe patient and EKG palpation will be ordered.  Also asked the patient if it is okay for me to refer her to see Dr. Nice at [facility], regarding her murmur.  Continue taking medications as prescribed.  Return to clinic as needed.

## 2022-10-06 NOTE — PROGRESS NOTES
Notify patient her kidney functions are elevated, stay hydrated with fluids specially water, nephrology referral has been initiated.  Regarding her diagnosis of murmur, instructe patient and EKG palpation will be ordered.  Also asked the patient if it is okay for me to refer her to see Dr. Nice at [facility], regarding her murmur.  Continue taking medications as prescribed.  Return to clinic as needed.

## 2022-10-18 DIAGNOSIS — R01.1 MURMUR: Primary | ICD-10-CM

## 2022-10-31 ENCOUNTER — HOSPITAL ENCOUNTER (OUTPATIENT)
Dept: RADIOLOGY | Facility: HOSPITAL | Age: 52
Discharge: HOME | End: 2022-10-31
Attending: NURSE PRACTITIONER
Payer: COMMERCIAL

## 2022-10-31 DIAGNOSIS — Z00.00 WELLNESS EXAMINATION: ICD-10-CM

## 2022-10-31 PROCEDURE — 77067 SCR MAMMO BI INCL CAD: CPT | Mod: 26,,, | Performed by: RADIOLOGY

## 2022-10-31 PROCEDURE — 77063 BREAST TOMOSYNTHESIS BI: CPT | Mod: TC

## 2022-10-31 PROCEDURE — 77063 BREAST TOMOSYNTHESIS BI: CPT | Mod: 26,,, | Performed by: RADIOLOGY

## 2022-10-31 PROCEDURE — 77067 SCR MAMMO BI INCL CAD: CPT | Mod: TC

## 2022-10-31 PROCEDURE — 77067 MAMMO DIGITAL SCREENING BILAT WITH TOMO: ICD-10-PCS | Mod: 26,,, | Performed by: RADIOLOGY

## 2022-10-31 PROCEDURE — 77063 MAMMO DIGITAL SCREENING BILAT WITH TOMO: ICD-10-PCS | Mod: 26,,, | Performed by: RADIOLOGY

## 2022-11-16 ENCOUNTER — TELEPHONE (OUTPATIENT)
Dept: FAMILY MEDICINE | Facility: CLINIC | Age: 52
End: 2022-11-16
Payer: COMMERCIAL

## 2022-11-16 DIAGNOSIS — R80.9 ALBUMINURIA: Primary | ICD-10-CM

## 2022-11-16 NOTE — PROGRESS NOTES
Please notify patient No suspicious mass, asymmetry, distortion, or calcification is identified.     IMPRESSION: NEGATIVE  Right Breast: Negative (BI-RADS 1)  Left Breast: Negative (BI-RADS 1)   Recommendations:  Recommend continued annual screening mammography (with Digital Breast Tomosynthesis), according to [employer] guidelines.

## 2022-11-16 NOTE — TELEPHONE ENCOUNTER
----- Message from JOY Graves sent at 11/16/2022  8:03 AM CST -----  Please notify patient No suspicious mass, asymmetry, distortion, or calcification is identified.     IMPRESSION: NEGATIVE  Right Breast: Negative (BI-RADS 1)  Left Breast: Negative (BI-RADS 1)   Recommendations:  Recommend continued annual screening mammography (with Digital Breast Tomosynthesis), according to [employer] guidelines.

## 2022-12-01 ENCOUNTER — DOCUMENTATION ONLY (OUTPATIENT)
Dept: NEPHROLOGY | Facility: CLINIC | Age: 52
End: 2022-12-01
Payer: COMMERCIAL

## 2022-12-01 NOTE — PROGRESS NOTES
Patient was no-show to clinic today. If patient calls back to reschedule, please schedule within 3-4 months.  Thank you

## 2023-01-09 PROBLEM — Z00.00 WELLNESS EXAMINATION: Status: RESOLVED | Noted: 2022-10-05 | Resolved: 2023-01-09

## 2023-02-08 DIAGNOSIS — I10 PRIMARY HYPERTENSION: ICD-10-CM

## 2023-02-08 RX ORDER — CLONIDINE HYDROCHLORIDE 0.1 MG/1
0.1 TABLET ORAL NIGHTLY
Qty: 90 TABLET | Refills: 0 | Status: SHIPPED | OUTPATIENT
Start: 2023-02-08 | End: 2023-04-05 | Stop reason: SDUPTHER

## 2023-02-08 NOTE — TELEPHONE ENCOUNTER
----- Message from Ashley Friedman sent at 2/8/2023  8:48 AM CST -----  Regarding: Refill  Provider: JOY Butler  [facility]  Last Visit:  Next Visit: 4/5/2023  Patient's Contact Number:    1. Name of Medication: Clonidine HCL  Dosage: 0.1 mg tab  Comments:    2. Name of Medication:  Dosage:  Comments:    3. Name of Medication:  Dosage:  Comments    4. Name of Medication:  Dosage:  Comments:    5. Name of Medication:  Dosage:  Comments:

## 2023-04-05 ENCOUNTER — OFFICE VISIT (OUTPATIENT)
Dept: FAMILY MEDICINE | Facility: CLINIC | Age: 53
End: 2023-04-05

## 2023-04-05 VITALS
WEIGHT: 210 LBS | TEMPERATURE: 98 F | BODY MASS INDEX: 31.1 KG/M2 | HEART RATE: 66 BPM | DIASTOLIC BLOOD PRESSURE: 73 MMHG | HEIGHT: 69 IN | OXYGEN SATURATION: 98 % | SYSTOLIC BLOOD PRESSURE: 111 MMHG | RESPIRATION RATE: 16 BRPM

## 2023-04-05 DIAGNOSIS — Z12.12 ENCOUNTER FOR COLORECTAL CANCER SCREENING: ICD-10-CM

## 2023-04-05 DIAGNOSIS — E79.0 ELEVATED URIC ACID IN BLOOD: ICD-10-CM

## 2023-04-05 DIAGNOSIS — I10 PRIMARY HYPERTENSION: Primary | ICD-10-CM

## 2023-04-05 DIAGNOSIS — R06.02 SOB (SHORTNESS OF BREATH) ON EXERTION: ICD-10-CM

## 2023-04-05 DIAGNOSIS — Z12.11 ENCOUNTER FOR COLORECTAL CANCER SCREENING: ICD-10-CM

## 2023-04-05 DIAGNOSIS — N18.32 STAGE 3B CHRONIC KIDNEY DISEASE: ICD-10-CM

## 2023-04-05 DIAGNOSIS — R01.1 MURMUR: ICD-10-CM

## 2023-04-05 PROBLEM — N18.30 STAGE 3 CHRONIC KIDNEY DISEASE: Status: ACTIVE | Noted: 2023-04-05

## 2023-04-05 PROCEDURE — 99214 PR OFFICE/OUTPT VISIT, EST, LEVL IV, 30-39 MIN: ICD-10-PCS | Mod: S$PBB,,, | Performed by: NURSE PRACTITIONER

## 2023-04-05 PROCEDURE — 99215 OFFICE O/P EST HI 40 MIN: CPT | Mod: PBBFAC | Performed by: NURSE PRACTITIONER

## 2023-04-05 PROCEDURE — 99214 OFFICE O/P EST MOD 30 MIN: CPT | Mod: S$PBB,,, | Performed by: NURSE PRACTITIONER

## 2023-04-05 RX ORDER — AMLODIPINE BESYLATE 10 MG/1
10 TABLET ORAL DAILY
Qty: 90 TABLET | Refills: 3 | Status: SHIPPED | OUTPATIENT
Start: 2023-04-05

## 2023-04-05 RX ORDER — CLONIDINE HYDROCHLORIDE 0.1 MG/1
0.1 TABLET ORAL DAILY PRN
Qty: 90 TABLET | Refills: 0 | Status: SHIPPED | OUTPATIENT
Start: 2023-04-05

## 2023-04-05 RX ORDER — ALLOPURINOL 100 MG/1
100 TABLET ORAL DAILY
Qty: 90 TABLET | Refills: 3 | Status: SHIPPED | OUTPATIENT
Start: 2023-04-05 | End: 2024-02-27

## 2023-04-05 RX ORDER — LOSARTAN POTASSIUM 50 MG/1
50 TABLET ORAL 2 TIMES DAILY
Qty: 180 TABLET | Refills: 3 | Status: SHIPPED | OUTPATIENT
Start: 2023-04-05 | End: 2023-10-26

## 2023-04-05 RX ORDER — LOSARTAN POTASSIUM 100 MG/1
100 TABLET ORAL DAILY
Qty: 90 TABLET | Refills: 3 | Status: SHIPPED | OUTPATIENT
Start: 2023-04-05 | End: 2023-04-05

## 2023-04-05 RX ORDER — HYDRALAZINE HYDROCHLORIDE 10 MG/1
10 TABLET, FILM COATED ORAL 2 TIMES DAILY
Qty: 180 TABLET | Refills: 1 | Status: SHIPPED | OUTPATIENT
Start: 2023-04-05 | End: 2023-08-10

## 2023-04-05 NOTE — ASSESSMENT & PLAN NOTE
Nephrology phone number provided to patient for her to call and schedule.  Instructed patient is very important for her to see Nephrology due to decreased kidney functions.  Questions solicited and answered, patient verbalized and agreed to plan of care .

## 2023-04-05 NOTE — PROGRESS NOTES
Patient Name: Fe James   : 1970  MRN: [id number]     SUBJECTIVE DATA:    CHIEF COMPLAINT:   Fe James is a 52 y.o. female who presents to clinic today with Follow-up (Routine follow up visit. Need refills on Rx. )        HPI:  52-year-old female presents to the clinic for routine follow-up and requesting medications refill.    Hypertension/murmur:  Current blood pressure 111/73.  Patient currently taking losartan 50 mg p.o. b.i.d., hydralazine 10 mg p.o. b.i.d., amlodipine 10 mg p.o. once daily, clonidine 0.1 mg as needed if systolic blood pressure above 170 or diastolic blood pressure above 100.  Patient states she has not received any phone call from Cardiology Dr. Nice regarding her murmur and short of breath on exertion.  From previous notes patient has a new onset of cardiac murmur diagnosed on 2019 and a left bundle branch block.  EKG was ordered from her last visit, patient never completed.  Will refer patient to your see Cardiology for follow-up although she was referred in the past for unknown reason she was not seen.  Patient denies any chest pain or short of breath at this time, denies any nausea or vomiting or near-syncope.  Medications refilled.  Instructed patient if symptoms worsens to go to nearest emergency department or call 911.  Questions solicited and answered, patient verbalized.    Nephrology: CKD3:  GFR (10/05/2022) 38 with BUN 29.4/ 1.6.  According to note patient has not answered her messages to schedule an appointment also a letter was sent to her house, patient denies any of the above happening.   Gout:  Discussed to continue to follow low purine diet, allopurinol 100 mg p.o. once daily has been sent to her preferred pharmacy.    Care gaps:  Patient has an appointment scheduled with gyn to complete her cervical cancer screening as well wellness exam.  Patient agreed for colonoscopy referral.  Patient to return in July for routine follow-up or sooner if  "needed.    Patient denies chest pain, shortness of breath, dyspnea on exertion, palpitations, peripheral edema, abdominal pain, nausea, vomiting, diarrhea, constipation, fatigue, fever, chills, dysuria,  hematuria,  or hematochezia.       HPI      ALLERGIES: Review of patient's allergies indicates:  No Known Allergies      ROS:  Review of Systems   Respiratory:  Positive for shortness of breath.    All other systems reviewed and are negative.      OBJECTIVE DATA:  Vital signs  Vitals:    04/05/23 0839   BP: 111/73   Pulse: 66   Resp: 16   Temp: 97.7 °F (36.5 °C)   SpO2: 98%   Weight: 95.3 kg (210 lb)   Height: 5' 9" (1.753 m)      Body mass index is 31.01 kg/m².    PHYSICAL EXAM:   Physical Exam  Vitals and nursing note reviewed.   Constitutional:       General: She is awake. She is not in acute distress.     Appearance: Normal appearance. She is well-developed, well-groomed and overweight. She is not ill-appearing, toxic-appearing or diaphoretic.   HENT:      Head: Normocephalic and atraumatic.      Right Ear: Tympanic membrane, ear canal and external ear normal.      Left Ear: Tympanic membrane, ear canal and external ear normal.      Nose: Nose normal. No congestion or rhinorrhea.      Mouth/Throat:      Lips: Pink.      Mouth: Mucous membranes are moist.      Pharynx: Oropharynx is clear. Uvula midline. No posterior oropharyngeal erythema.   Eyes:      General: Lids are normal.      Extraocular Movements: Extraocular movements intact.      Conjunctiva/sclera: Conjunctivae normal.      Pupils: Pupils are equal, round, and reactive to light.   Neck:      Trachea: Trachea and phonation normal.   Cardiovascular:      Rate and Rhythm: Normal rate and regular rhythm.      Pulses: Normal pulses.           Radial pulses are 2+ on the right side and 2+ on the left side.      Heart sounds: Murmur heard.   Pulmonary:      Effort: Pulmonary effort is normal. No respiratory distress.      Breath sounds: Normal breath sounds " and air entry. No stridor. No wheezing, rhonchi or rales.   Chest:      Chest wall: No tenderness.   Abdominal:      General: Bowel sounds are normal.      Palpations: Abdomen is soft.      Tenderness: There is no abdominal tenderness. There is no right CVA tenderness or left CVA tenderness.   Musculoskeletal:         General: Normal range of motion.      Cervical back: Normal range of motion and neck supple. No rigidity or tenderness.      Right lower leg: No edema.      Left lower leg: No edema.   Lymphadenopathy:      Cervical: No cervical adenopathy.   Skin:     General: Skin is warm and dry.      Capillary Refill: Capillary refill takes less than 2 seconds.      Findings: No rash.   Neurological:      General: No focal deficit present.      Mental Status: She is alert and oriented to person, place, and time. Mental status is at baseline.      GCS: GCS eye subscore is 4. GCS verbal subscore is 5. GCS motor subscore is 6.      Gait: Gait normal.   Psychiatric:         Attention and Perception: Attention and perception normal.         Mood and Affect: Mood and affect normal.         Speech: Speech normal.         Behavior: Behavior normal. Behavior is cooperative.         Thought Content: Thought content normal.         Cognition and Memory: Cognition and memory normal.         Judgment: Judgment normal.        ASSESSMENT/PLAN:  1. Primary hypertension  Assessment & Plan:  Medications refilled, take as directed.    Orders:  -     Discontinue: losartan (COZAAR) 100 MG tablet; Take 1 tablet (100 mg total) by mouth once daily.  Dispense: 90 tablet; Refill: 3  -     hydrALAZINE (APRESOLINE) 10 MG tablet; Take 1 tablet (10 mg total) by mouth 2 (two) times daily.  Dispense: 180 tablet; Refill: 1  -     losartan (COZAAR) 50 MG tablet; Take 1 tablet (50 mg total) by mouth 2 (two) times daily.  Dispense: 180 tablet; Refill: 3  -     cloNIDine (CATAPRES) 0.1 MG tablet; Take 1 tablet (0.1 mg total) by mouth daily as needed  (elevated blood pressure). If BP SYSTOLOIC 170 AND DYASTOLIC 100.  Dispense: 90 tablet; Refill: 0  -     amLODIPine (NORVASC) 10 MG tablet; Take 1 tablet (10 mg total) by mouth once daily.  Dispense: 90 tablet; Refill: 3  -     Ambulatory referral/consult to Cardiology; Future; Expected date: 04/12/2023    2. Stage 3b chronic kidney disease  Assessment & Plan:   Nephrology phone number provided to patient for her to call and schedule.  Instructed patient is very important for her to see Nephrology due to decreased kidney functions.  Questions solicited and answered, patient verbalized and agreed to plan of care .      3. SOB (shortness of breath) on exertion  -     Ambulatory referral/consult to Cardiology; Future; Expected date: 04/12/2023    4. Murmur  -     Ambulatory referral/consult to Cardiology; Future; Expected date: 04/12/2023    5. Elevated uric acid in blood  -     allopurinoL (ZYLOPRIM) 100 MG tablet; Take 1 tablet (100 mg total) by mouth once daily.  Dispense: 90 tablet; Refill: 3    6. Encounter for colorectal cancer screening  -     Ambulatory referral/consult to Gastroenterology; Future; Expected date: 04/12/2023           RESULTS:  No results found for this or any previous visit (from the past 1008 hour(s)).      Follow Up:  Follow up in about 4 months (around 7/28/2023).      Previous medical history/lab work/radiology reviewed and considered during medical management decisions.   Medication list reviewed and medication reconciliation performed.  Patient was provided  and care about his/her current diagnosis (es) and medications including risk/benefit and side effects/adverse events, over the counter medication uses/doses, home self-care and contact precautions,  and red flags and indications for when to seek immediate medical attention.   Patient was advised to continue compliance with current medication list and medical recommendations.  Patient dvised continued compliance with recommended  eating habits/ diets for medical conditions and exercise 150 minutes/ week (if possible) for medical condition (s).  Educational handouts and instructions on selected disease management in AVS (After Visit Summary).    All of the patient's questions were answered to patient's satisfaction.   The patient was receptive, expressed verbal understanding and agreement the above plan.       This note was created with the assistance of a voice recognition software or phone dictation. There may be transcription errors as a result of using this technology however minimal. Effort has been made to assure accuracy of transcription but any obvious errors or omissions should be clarified with the author of the document

## 2023-05-01 ENCOUNTER — APPOINTMENT (OUTPATIENT)
Dept: LAB | Facility: HOSPITAL | Age: 53
End: 2023-05-01
Attending: NURSE PRACTITIONER

## 2023-05-01 ENCOUNTER — TELEPHONE (OUTPATIENT)
Dept: NEPHROLOGY | Facility: CLINIC | Age: 53
End: 2023-05-01

## 2023-05-01 ENCOUNTER — OFFICE VISIT (OUTPATIENT)
Dept: NEPHROLOGY | Facility: CLINIC | Age: 53
End: 2023-05-01

## 2023-05-01 VITALS
OXYGEN SATURATION: 97 % | TEMPERATURE: 98 F | SYSTOLIC BLOOD PRESSURE: 129 MMHG | HEIGHT: 69 IN | WEIGHT: 213.19 LBS | BODY MASS INDEX: 31.58 KG/M2 | DIASTOLIC BLOOD PRESSURE: 82 MMHG | HEART RATE: 75 BPM | RESPIRATION RATE: 18 BRPM

## 2023-05-01 DIAGNOSIS — I10 PRIMARY HYPERTENSION: ICD-10-CM

## 2023-05-01 DIAGNOSIS — N18.32 CKD STAGE G3B/A3, GFR 30-44 AND ALBUMIN CREATININE RATIO >300 MG/G: Primary | ICD-10-CM

## 2023-05-01 PROBLEM — M72.2 PLANTAR FASCIITIS: Status: ACTIVE | Noted: 2023-05-01

## 2023-05-01 PROBLEM — E66.9 OBESITY: Status: ACTIVE | Noted: 2023-05-01

## 2023-05-01 PROBLEM — M54.30 SCIATICA: Status: ACTIVE | Noted: 2023-05-01

## 2023-05-01 PROCEDURE — 99203 OFFICE O/P NEW LOW 30 MIN: CPT | Mod: S$PBB,,, | Performed by: NURSE PRACTITIONER

## 2023-05-01 PROCEDURE — 99203 PR OFFICE/OUTPT VISIT, NEW, LEVL III, 30-44 MIN: ICD-10-PCS | Mod: S$PBB,,, | Performed by: NURSE PRACTITIONER

## 2023-05-01 PROCEDURE — 99214 OFFICE O/P EST MOD 30 MIN: CPT | Mod: PBBFAC | Performed by: NURSE PRACTITIONER

## 2023-05-01 NOTE — PROGRESS NOTES
"[facility]  Nephrology Clinic Note    Chief complaint: Chronic Kidney Disease (New pt, referred, did not do labs, dyspnea)    History of present illness:   Fe James is a 53 y.o. Black or  female with past medical history of chronic kidney disease, hypertension, hyperuricemia, and obesity. Presents to re-establish care in the nephrology clinic. Denies complaints.     Review of Systems  12 point review of systems conducted, negative except as stated in the history of present illness.    Allergies: Patient has No Known Allergies.     Past Medical History:  has a past medical history of CKD (chronic kidney disease) stage 3, GFR 30-59 ml/min, Gout, unspecified, HTN (hypertension), and Murmur, cardiac.    Procedure History:  has a past surgical history that includes Bilateral tubal ligation and Tonsillectomy.    Family History: family history includes Aneurysm in her sister; Down syndrome in her brother; Hypertension in her sister; Kidney disease in her brother and mother; No Known Problems in her brother; Stroke in her father and mother.    Social History:  reports that she has never smoked. She has never used smokeless tobacco. She reports that she does not currently use alcohol. She reports that she does not use drugs.    Physical exam  /82 (BP Location: Right arm, Patient Position: Sitting, BP Method: Large (Automatic))   Pulse 75   Temp 98.2 °F (36.8 °C) (Oral)   Resp 18   Ht 5' 8.9" (1.75 m)   Wt 96.7 kg (213 lb 3.2 oz)   SpO2 97%   BMI 31.58 kg/m²   General appearance: Patient is in no acute distress.  Skin: No rashes or wounds.  HEENT: PERRLA, EOMI, no scleral icterus, no JVD. Neck is supple.  Chest: Respirations are unlabored. Lungs sounds are clear.   Heart: S1, S2.   Abdomen: Benign.  : Deferred.  Extremities: No edema, peripheral pulses are palpable.   Neuro: No focal deficits.     Home Medications:    Current Outpatient Medications:     " allopurinoL (ZYLOPRIM) 100 MG tablet, Take 1 tablet (100 mg total) by mouth once daily., Disp: 90 tablet, Rfl: 3    amLODIPine (NORVASC) 10 MG tablet, Take 1 tablet (10 mg total) by mouth once daily., Disp: 90 tablet, Rfl: 3    cloNIDine (CATAPRES) 0.1 MG tablet, Take 1 tablet (0.1 mg total) by mouth daily as needed (elevated blood pressure). If BP SYSTOLOIC 170 AND DYASTOLIC 100., Disp: 90 tablet, Rfl: 0    hydrALAZINE (APRESOLINE) 10 MG tablet, Take 1 tablet (10 mg total) by mouth 2 (two) times daily., Disp: 180 tablet, Rfl: 1    losartan (COZAAR) 50 MG tablet, Take 1 tablet (50 mg total) by mouth 2 (two) times daily., Disp: 180 tablet, Rfl: 3    Laboratory data    Serum  Lab Results   Component Value Date    WBC 4.52 05/01/2023    HGB 14.3 05/01/2023    HCT 44.5 05/01/2023     05/01/2023     05/01/2023    K 4.4 05/01/2023    CHLORIDE 108 (H) 05/01/2023    CO2 26 05/01/2023    BUN 31.0 (H) 05/01/2023    CREATININE 1.96 (H) 05/01/2023    EGFRNORACEVR 30 05/01/2023    GLUCOSE 89 05/01/2023    CALCIUM 9.7 05/01/2023    ALKPHOS 117 05/01/2023    LABPROT 7.7 05/01/2023    ALBUMIN 4.3 05/01/2023    BILIDIR 0.1 06/16/2021    IBILI 0.20 06/16/2021    AST 13 05/01/2023    ALT 13 05/01/2023    MG 2.35 12/28/2020    PHOS 2.9 12/28/2020      Lab Results   Component Value Date    HGBA1C 5.2 10/05/2022    .1 (H) 12/28/2020    [id number] 54.1 12/28/2020    HIV Nonreactive 06/04/2020    HEPCAB Nonreactive 06/04/2020    HEPBSURFAG Nonreactive 06/04/2020     Urine:  Lab Results   Component Value Date    COLORUA Light-Yellow 05/01/2023    APPEARANCEUA Turbid (A) 05/01/2023    SGUA 1.013 05/01/2023    PHUA 5.5 05/01/2023    PROTEINUA 2+ (A) 05/01/2023    GLUCOSEUA Normal 05/01/2023    KETONESUA Negative 05/01/2023    BLOODUA 1+ (A) 05/01/2023    NITRITESUA Negative 05/01/2023    LEUKOCYTESUR 250 (A) 05/01/2023    RBCUA 0-5 05/01/2023    WBCUA 3-5 05/01/2023    BACTERIA Trace (A) 05/01/2023    SQEPUA Many  05/01/2023    HYALINECASTS 0-2 (A) 06/16/2021    CREATRANDUR 107.9 05/01/2023    PROTEINURINE 108.3 05/01/2023    UPROTCREA 1.0 05/01/2023         Imaging  US Retroperitoneal Limited 03/03/2020    Narrative  History  Chronic kidney disease.    Reference Study  None available    Findings  Grayscale and color Doppler images of the kidneys were obtained. The  right kidney measures 9 cm and the left kidney measures 9 cm in  length. There is heterogeneous renal parenchymal echogenicity with  loss of corticomedullary differentiation. There is no hydronephrosis.  There is a 2 cm cyst of the left kidney.    Impression  Findings suggestive of medical renal disease. Electronically Signed By: Douglas Gonzalez MD  Date/Time Signed: 03/03/2020 13:17      Impression and plan     CKD stage G3b/A3, GFR 30-44 and albumin creatinine ratio >300 mg/g    Possibly hypertensive kidney disease. Will begin cursory proteinuria evaluation to r/o alternative etiologies. Patient has family history of kidney disease.  Based on patient's medical history, family history,  and previous test results, their risk to test positive for inherited cause of CKD is increased.   After discussing the risks, benefits, and limitations of genetic testing, patient elected to proceed with [facility] panel. Based on the NCCN guidelines, patient meets the clinical criteria required for coverage of multi-gene panel test. The results could affect their clinical management recommendations.  A follow-up appointment to receive the results has been scheduled for 4 months from now. Medical management recommendations will be made based on the result of their genetic analysis.    Continue renal sparing activities:  -2 g a day dietary sodium restriction  -control high blood pressure (goal blood pressure is less than 130/80, patient was advised to check blood pressure once or twice a week and bring blood pressure logs to next office visit)  -exercise at least 30 minutes a  day, 5 days a week  -maintain healthy weight  -stay well hydrated (drink water only, avoid juices, sweet tea, and sodas)  -ask about staying up-to-date on vaccinations (flu vaccine, pneumonia vaccine, hepatitis B vaccine)  -avoid excessive use of NSAIDs (ibuprofen, naproxen, Aleve, Advil, Toradol, Mobic), take Tylenol as needed for headache or mild pain  -take cholesterol lowering medications if prescribed (LDL goal less than 100)    Follow-up with the primary care provider as scheduled.    Follow up in about 4 months (around 9/1/2023). in nephrology (kidney) clinic with routine labs        Primary hypertension  Blood pressure reading is at goal, continue current antihypertensive regimen and 2 g a day dietary sodium restriction.      BMI 31.0-31.9,adult  Lifestyle and dietary interventions discussed, patient counseled on weight loss using portion control, non- sedentary lifestyle, low-carbohydrate/low fat diet.    Orders Placed This Encounter   Procedures    Comprehensive Metabolic Panel     Standing Status:   Future     Standing Expiration Date:   9/20/2023    Protein/Creatinine Ratio, Urine     Standing Status:   Future     Standing Expiration Date:   9/20/2023     Order Specific Question:   Specimen Source     Answer:   Urine    Urinalysis, Reflex to Urine Culture     Standing Status:   Future     Standing Expiration Date:   9/20/2023     Order Specific Question:   Specimen Source     Answer:   Urine    Anti-Neutrophilic Cytoplasmic Antibody     Standing Status:   Future     Standing Expiration Date:   9/20/2023    Antinuclear Antibody (OREN), HEp-2, IgG     Standing Status:   Future     Standing Expiration Date:   9/20/2023    HIV 1/2 Ag/Ab (4th Gen)     Standing Status:   Future     Standing Expiration Date:   9/20/2023     Order Specific Question:   Release to patient     Answer:   Immediate    Hepatitis Panel, Acute     Standing Status:   Future     Standing Expiration Date:   9/20/2023    Immunofixation &  Protein Electrophoresis & Immunoglobulins     Standing Status:   Future     Standing Expiration Date:   9/20/2023 5/1/23  Jazmin Huggins NP  [facility]

## 2023-05-01 NOTE — TELEPHONE ENCOUNTER
----- Message from JOY Sánchez sent at 5/1/2023  1:54 PM CDT -----  Please let the patient know that kidney function is stable, no significant change since 2 years ago.  Continue current treatment plan.

## 2023-05-30 ENCOUNTER — PATIENT MESSAGE (OUTPATIENT)
Dept: ADMINISTRATIVE | Facility: HOSPITAL | Age: 53
End: 2023-05-30
Payer: COMMERCIAL

## 2023-06-19 NOTE — PROGRESS NOTES
CHIEF COMPLAINT:   Chief Complaint   Patient presents with    Follow-up     Murmur sob    Palpitations     Palpitations but not often severe sob                                                  HPI:  Fe James 53 y.o. female who presents to [facility] as an initial referral for shortness of breath. The patient reports ongoing shortness with exertion that has been ongoing for the past year with acute worsening on the past six months.  The patient reports that prior to a year ago, she was very active and was able to perform all her regular activities including outside work which includes mowing her lawn and weed eating, without experiencing any symptoms.  However, she now endorses shortness of breath  and fatigue with these activities and when ambulating less than 20 ft.  She reports rare episodes of left-sided chest tightness that occurs at random, with or without exertion.  She reports the chest pain has occurred approximately 2-3 times over the past 6 months and is very transient in nature.  She endorses intermittent palpitations in which she feels her heart race.  She reports the episodes may occur 2 to 3 times a week and may last up to a minute before spontaneously resolving.  She denies any orthopnea, PND, claudication symptoms or syncope.  She reports intermittent episodes of peripheral edema that occurs after being on her feet for extended period of time but notes resolution after elevating her legs.  The patient denies any previous cardiac history or evaluation.  She reports that both her parents  of CVAs.                                                                                                                                                                                           Social History:  Denies any Illicit drug, ETOH or tobacco use  Family History: Mom-  of massive CVA at age 56-; Dad-  of CVA at age 72, Brother- Downs Syndrome-enlarged heart: sister-  of  brain aneurysm at age 16                                                                                                                                                                                                                                                                                               CARDIAC TESTING:         Patient Active Problem List   Diagnosis    Primary hypertension    Elevated uric acid in blood    Heart murmur    Stage 3 chronic kidney disease    Encounter for colorectal cancer screening    Murmur    SOB (shortness of breath) on exertion    Obesity    Plantar fasciitis    Sciatica     Past Surgical History:   Procedure Laterality Date    BILATERAL TUBAL LIGATION      TONSILLECTOMY       Social History     Socioeconomic History    Marital status:     Highest education level: Some college, no degree   Occupational History    Occupation: stor manager at [employer]   Tobacco Use    Smoking status: Never    Smokeless tobacco: Never   Substance and Sexual Activity    Alcohol use: Not Currently    Drug use: Never    Sexual activity: Not Currently     Partners: Male     Social Determinants of Health     Financial Resource Strain: Low Risk     Difficulty of Paying Living Expenses: Not hard at all   Food Insecurity: No Food Insecurity    Worried About Running Out of Food in the Last Year: Never true    Ran Out of Food in the Last Year: Never true   Transportation Needs: No Transportation Needs    Lack of Transportation (Medical): No    Lack of Transportation (Non-Medical): No   Physical Activity: Inactive    Days of Exercise per Week: 0 days    Minutes of Exercise per Session: 0 min   Stress: No Stress Concern Present    Feeling of Stress : Not at all   Social Connections: Moderately Integrated    Frequency of Communication with Friends and Family: Twice a week    Frequency of Social Gatherings with Friends and Family: Once a week    Attends Orthodox Services: More than 4 times per  "year    Active Member of Clubs or Organizations: Yes    Attends Club or Organization Meetings: Never    Marital Status:    Housing Stability: Low Risk     Unable to Pay for Housing in the Last Year: No    Number of Places Lived in the Last Year: 1    Unstable Housing in the Last Year: No        Family History   Problem Relation Age of Onset    Kidney disease Mother     Stroke Mother     Stroke Father     Aneurysm Sister     Hypertension Sister     Kidney disease Brother     Down syndrome Brother     No Known Problems Brother      Review of patient's allergies indicates:  No Known Allergies      ROS:  Review of Systems   Constitutional: Negative.    HENT: Negative.     Eyes: Negative.    Respiratory:  Positive for shortness of breath.    Cardiovascular:  Positive for chest pain, palpitations and leg swelling.   Gastrointestinal: Negative.    Genitourinary: Negative.    Musculoskeletal: Negative.    Skin: Negative.    Neurological: Negative.    Endo/Heme/Allergies: Negative.    Psychiatric/Behavioral: Negative.                                                                                                                                                                                Negative except as stated in the history of present illness. See HPI for details.    PHYSICAL EXAM:  Visit Vitals  BP (!) 154/95 (BP Location: Left arm, Patient Position: Sitting, BP Method: Medium (Automatic))   Pulse 94   Temp 98.1 °F (36.7 °C)   Resp 18   Ht 5' 10" (1.778 m)   Wt 96.3 kg (212 lb 4.9 oz)   SpO2 99%   BMI 30.46 kg/m²       Physical Exam  HENT:      Head: Normocephalic.      Nose: Nose normal.   Eyes:      Pupils: Pupils are equal, round, and reactive to light.   Cardiovascular:      Rate and Rhythm: Normal rate and regular rhythm.      Heart sounds: Murmur heard.   Pulmonary:      Effort: Pulmonary effort is normal.   Abdominal:      General: Abdomen is flat. Bowel sounds are normal.      Palpations: Abdomen is " soft.   Musculoskeletal:         General: Normal range of motion.      Cervical back: Normal range of motion.   Skin:     General: Skin is warm.   Neurological:      General: No focal deficit present.      Mental Status: She is alert.   Psychiatric:         Mood and Affect: Mood normal.     Current Outpatient Medications   Medication Instructions    allopurinoL (ZYLOPRIM) 100 mg, Oral, Daily    amLODIPine (NORVASC) 10 mg, Oral, Daily    cloNIDine (CATAPRES) 0.1 mg, Oral, Daily PRN, If BP SYSTOLOIC 170 AND DYASTOLIC 100.    hydrALAZINE (APRESOLINE) 10 mg, Oral, 2 times daily    losartan (COZAAR) 50 mg, Oral, 2 times daily        All medications, laboratory studies, cardiac diagnostic imaging reviewed.     Lab Results   Component Value Date    LDL 97.00 10/05/2022    LDL 93.00 06/16/2021    TRIG 117 10/05/2022    TRIG 111 06/16/2021    CREATININE 1.96 (H) 05/01/2023    MG 2.35 12/28/2020    K 4.4 05/01/2023        ASSESSMENT/PLAN:  SOB  - Reports ongoing shortness of breath with minimal exertion, progressively worsened over the past six months   - Reports occasional peripheral edema that resolves with leg elevation  - RICHARDSON noted upon exam   - Obtain baseline echocardiogram to assess LV function and check for valvular abnormalities      Chest pain   - Reports rare episodes of left sided chest tightness that occurs at random. Reports occurred 2-3 times over past six months   - Endorses SOB with minimal exertion, including normal ADLs   - Continue amlodipine. Will add SL nitro prn CP   - Nuclear stress test for further evaluation of chest pain and shortness of breath with minimal exertion. May be anginal equivalent.  Has HTN, obesity      Palpitations   - Reports intermittent palpitations in which she feels her heart race. Reports may last up to minute, occurring 1-2x a week    - 7 day event monitor  to assess for any arrhthymias   - Advised to avoid/decrease caffeine intake and remain well hydrated   - EKG today      HTN  - Repeat BP above goal   - Continue current medications, losartan 50 mg BID, hydralazine 10 mg b.i.d., and amlodipine 10 mg for now.  Has prn clonidine per PCP. Instructed patient to monitor and log and call with blood pressure readings in 2 weeks.  If remains above goal, will plan to discontinue amlodipine and start nifedipine for better BP control  - Continue management per PCP       CKD  - Management per nephrology       EKG   Echocardiogram  Nuclear Stress Test   7 day event monitor   Nurse visit in 2 weeks for BP check (Call)  SL nitro prn CP   Follow-up in cardiology clinic in 3 months or sooner pending results  Follow-up with PCP/nephrology as directed  ED precautions

## 2023-06-26 ENCOUNTER — OFFICE VISIT (OUTPATIENT)
Dept: CARDIOLOGY | Facility: CLINIC | Age: 53
End: 2023-06-26
Payer: COMMERCIAL

## 2023-06-26 VITALS
TEMPERATURE: 98 F | HEART RATE: 94 BPM | WEIGHT: 212.31 LBS | BODY MASS INDEX: 30.39 KG/M2 | SYSTOLIC BLOOD PRESSURE: 144 MMHG | RESPIRATION RATE: 18 BRPM | OXYGEN SATURATION: 99 % | DIASTOLIC BLOOD PRESSURE: 72 MMHG | HEIGHT: 70 IN

## 2023-06-26 DIAGNOSIS — R01.1 MURMUR: ICD-10-CM

## 2023-06-26 DIAGNOSIS — R07.89 OTHER CHEST PAIN: Primary | ICD-10-CM

## 2023-06-26 DIAGNOSIS — I10 PRIMARY HYPERTENSION: ICD-10-CM

## 2023-06-26 DIAGNOSIS — R06.02 SOB (SHORTNESS OF BREATH) ON EXERTION: ICD-10-CM

## 2023-06-26 DIAGNOSIS — R00.2 PALPITATIONS: ICD-10-CM

## 2023-06-26 PROCEDURE — 1160F RVW MEDS BY RX/DR IN RCRD: CPT | Mod: CPTII,,, | Performed by: NURSE PRACTITIONER

## 2023-06-26 PROCEDURE — 3008F BODY MASS INDEX DOCD: CPT | Mod: CPTII,,, | Performed by: NURSE PRACTITIONER

## 2023-06-26 PROCEDURE — 1159F PR MEDICATION LIST DOCUMENTED IN MEDICAL RECORD: ICD-10-PCS | Mod: CPTII,,, | Performed by: NURSE PRACTITIONER

## 2023-06-26 PROCEDURE — 99214 OFFICE O/P EST MOD 30 MIN: CPT | Mod: S$PBB,,, | Performed by: NURSE PRACTITIONER

## 2023-06-26 PROCEDURE — 4010F ACE/ARB THERAPY RXD/TAKEN: CPT | Mod: CPTII,,, | Performed by: NURSE PRACTITIONER

## 2023-06-26 PROCEDURE — 3066F PR DOCUMENTATION OF TREATMENT FOR NEPHROPATHY: ICD-10-PCS | Mod: CPTII,,, | Performed by: NURSE PRACTITIONER

## 2023-06-26 PROCEDURE — 3066F NEPHROPATHY DOC TX: CPT | Mod: CPTII,,, | Performed by: NURSE PRACTITIONER

## 2023-06-26 PROCEDURE — 99215 OFFICE O/P EST HI 40 MIN: CPT | Mod: PBBFAC | Performed by: NURSE PRACTITIONER

## 2023-06-26 PROCEDURE — 3077F SYST BP >= 140 MM HG: CPT | Mod: CPTII,,, | Performed by: NURSE PRACTITIONER

## 2023-06-26 PROCEDURE — 93005 ELECTROCARDIOGRAM TRACING: CPT

## 2023-06-26 PROCEDURE — 4010F PR ACE/ARB THEARPY RXD/TAKEN: ICD-10-PCS | Mod: CPTII,,, | Performed by: NURSE PRACTITIONER

## 2023-06-26 PROCEDURE — 3077F PR MOST RECENT SYSTOLIC BLOOD PRESSURE >= 140 MM HG: ICD-10-PCS | Mod: CPTII,,, | Performed by: NURSE PRACTITIONER

## 2023-06-26 PROCEDURE — 3008F PR BODY MASS INDEX (BMI) DOCUMENTED: ICD-10-PCS | Mod: CPTII,,, | Performed by: NURSE PRACTITIONER

## 2023-06-26 PROCEDURE — 1160F PR REVIEW ALL MEDS BY PRESCRIBER/CLIN PHARMACIST DOCUMENTED: ICD-10-PCS | Mod: CPTII,,, | Performed by: NURSE PRACTITIONER

## 2023-06-26 PROCEDURE — 3078F DIAST BP <80 MM HG: CPT | Mod: CPTII,,, | Performed by: NURSE PRACTITIONER

## 2023-06-26 PROCEDURE — 1159F MED LIST DOCD IN RCRD: CPT | Mod: CPTII,,, | Performed by: NURSE PRACTITIONER

## 2023-06-26 PROCEDURE — 99214 PR OFFICE/OUTPT VISIT, EST, LEVL IV, 30-39 MIN: ICD-10-PCS | Mod: S$PBB,,, | Performed by: NURSE PRACTITIONER

## 2023-06-26 PROCEDURE — 3078F PR MOST RECENT DIASTOLIC BLOOD PRESSURE < 80 MM HG: ICD-10-PCS | Mod: CPTII,,, | Performed by: NURSE PRACTITIONER

## 2023-06-26 RX ORDER — NITROGLYCERIN 0.4 MG/1
0.4 TABLET SUBLINGUAL EVERY 5 MIN PRN
Qty: 25 TABLET | Refills: 3 | Status: SHIPPED | OUTPATIENT
Start: 2023-06-26 | End: 2024-06-25

## 2023-06-26 NOTE — PATIENT INSTRUCTIONS
EKG   Echocardiogram  Nuclear Stress Test   7 day event monitor   Nurse visit in 2 weeks for BP check (call)  SL nitro prn CP   Follow-up in cardiology clinic in 3 months or sooner pending results  Follow-up with PCP/nephrology as directed   ED precautions    16

## 2023-07-10 ENCOUNTER — PATIENT MESSAGE (OUTPATIENT)
Dept: ADMINISTRATIVE | Facility: HOSPITAL | Age: 53
End: 2023-07-10
Payer: COMMERCIAL

## 2023-07-12 ENCOUNTER — HOSPITAL ENCOUNTER (OUTPATIENT)
Dept: CARDIOLOGY | Facility: HOSPITAL | Age: 53
Discharge: HOME | End: 2023-07-12
Attending: NURSE PRACTITIONER
Payer: COMMERCIAL

## 2023-07-12 DIAGNOSIS — R00.2 PALPITATIONS: ICD-10-CM

## 2023-07-12 PROCEDURE — 93229 REMOTE 30 DAY ECG TECH SUPP: CPT

## 2023-07-27 ENCOUNTER — HOSPITAL ENCOUNTER (OUTPATIENT)
Dept: CARDIOLOGY | Facility: HOSPITAL | Age: 53
Discharge: HOME | End: 2023-07-27
Attending: NURSE PRACTITIONER
Payer: COMMERCIAL

## 2023-07-27 ENCOUNTER — HOSPITAL ENCOUNTER (OUTPATIENT)
Dept: RADIOLOGY | Facility: HOSPITAL | Age: 53
Discharge: HOME | End: 2023-07-27
Attending: NURSE PRACTITIONER
Payer: COMMERCIAL

## 2023-07-27 VITALS
RESPIRATION RATE: 20 BRPM | WEIGHT: 212.06 LBS | HEART RATE: 72 BPM | DIASTOLIC BLOOD PRESSURE: 106 MMHG | BODY MASS INDEX: 30.36 KG/M2 | SYSTOLIC BLOOD PRESSURE: 174 MMHG | HEIGHT: 70 IN

## 2023-07-27 VITALS
HEIGHT: 70 IN | SYSTOLIC BLOOD PRESSURE: 167 MMHG | WEIGHT: 212 LBS | DIASTOLIC BLOOD PRESSURE: 98 MMHG | BODY MASS INDEX: 30.35 KG/M2

## 2023-07-27 DIAGNOSIS — R00.2 PALPITATIONS: ICD-10-CM

## 2023-07-27 DIAGNOSIS — R07.89 OTHER CHEST PAIN: ICD-10-CM

## 2023-07-27 DIAGNOSIS — R06.02 SOB (SHORTNESS OF BREATH) ON EXERTION: ICD-10-CM

## 2023-07-27 LAB
AV INDEX (PROSTH): 1.01
AV MEAN GRADIENT: 3 MMHG
AV PEAK GRADIENT: 8 MMHG
AV VALVE AREA: 4.3 CM2
AV VELOCITY RATIO: 0.76
BSA FOR ECHO PROCEDURE: 2.18 M2
CV ECHO LV RWT: 0.64 CM
DOP CALC AO PEAK VEL: 1.39 M/S
DOP CALC AO VTI: 21.2 CM
DOP CALC LVOT AREA: 4.3 CM2
DOP CALC LVOT DIAMETER: 2.33 CM
DOP CALC LVOT PEAK VEL: 1.05 M/S
DOP CALC LVOT STROKE VOLUME: 91.2 CM3
DOP CALC MV VTI: 20.1 CM
DOP CALCLVOT PEAK VEL VTI: 21.4 CM
E WAVE DECELERATION TIME: 239.83 MSEC
E/A RATIO: 0.83
E/E' RATIO: 10.89 M/S
ECHO LV POSTERIOR WALL: 1.48 CM (ref 0.6–1.1)
EJECTION FRACTION: 65 %
FRACTIONAL SHORTENING: 37 % (ref 28–44)
INTERVENTRICULAR SEPTUM: 1.68 CM (ref 0.6–1.1)
LEFT ATRIUM SIZE: 4.63 CM
LEFT INTERNAL DIMENSION IN SYSTOLE: 2.91 CM (ref 2.1–4)
LEFT VENTRICLE DIASTOLIC VOLUME INDEX: 46.54 ML/M2
LEFT VENTRICLE DIASTOLIC VOLUME: 99.59 ML
LEFT VENTRICLE MASS INDEX: 146 G/M2
LEFT VENTRICLE SYSTOLIC VOLUME INDEX: 15.1 ML/M2
LEFT VENTRICLE SYSTOLIC VOLUME: 32.37 ML
LEFT VENTRICULAR INTERNAL DIMENSION IN DIASTOLE: 4.65 CM (ref 3.5–6)
LEFT VENTRICULAR MASS: 313.29 G
LV LATERAL E/E' RATIO: 9.8 M/S
LV SEPTAL E/E' RATIO: 12.25 M/S
LVOT MG: 2.42 MMHG
LVOT MV: 0.73 CM/S
MV MEAN GRADIENT: 1 MMHG
MV PEAK A VEL: 0.59 M/S
MV PEAK E VEL: 0.49 M/S
MV PEAK GRADIENT: 2 MMHG
MV STENOSIS PRESSURE HALF TIME: 69.55 MS
MV VALVE AREA BY CONTINUITY EQUATION: 4.54 CM2
MV VALVE AREA P 1/2 METHOD: 3.16 CM2
OHS LV EJECTION FRACTION SIMPSONS BIPLANE MOD: 6 %
PISA TR MAX VEL: 1.77 M/S
RA PRESSURE: 3 MMHG
RIGHT VENTRICULAR END-DIASTOLIC DIMENSION: 3.58 CM
TDI LATERAL: 0.05 M/S
TDI SEPTAL: 0.04 M/S
TDI: 0.05 M/S
TR MAX PG: 13 MMHG
TV REST PULMONARY ARTERY PRESSURE: 16 MMHG

## 2023-07-27 PROCEDURE — 78452 HT MUSCLE IMAGE SPECT MULT: CPT

## 2023-07-27 PROCEDURE — A9502 TC99M TETROFOSMIN: HCPCS

## 2023-07-27 PROCEDURE — 93306 TTE W/DOPPLER COMPLETE: CPT

## 2023-07-27 PROCEDURE — 93017 CV STRESS TEST TRACING ONLY: CPT

## 2023-07-27 PROCEDURE — 63600175 PHARM REV CODE 636 W HCPCS

## 2023-07-27 RX ORDER — REGADENOSON 0.08 MG/ML
INJECTION, SOLUTION INTRAVENOUS
Status: COMPLETED
Start: 2023-07-27 | End: 2023-07-27

## 2023-07-27 RX ADMIN — REGADENOSON 0.4 MG: 0.08 INJECTION, SOLUTION INTRAVENOUS at 08:07

## 2023-07-28 LAB
CV STRESS BASE HR: 71 BPM
DIASTOLIC BLOOD PRESSURE: 106 MMHG
EJECTION FRACTION- HIGH: 65 %
END DIASTOLIC INDEX-HIGH: 153 ML/M2
END DIASTOLIC INDEX-LOW: 93 ML/M2
END SYSTOLIC INDEX-HIGH: 71 ML/M2
END SYSTOLIC INDEX-LOW: 31 ML/M2
NUC REST DIASTOLIC VOLUME INDEX: 114
NUC REST EJECTION FRACTION: 51
NUC REST SYSTOLIC VOLUME INDEX: 56
NUC STRESS DIASTOLIC VOLUME INDEX: 118
NUC STRESS EJECTION FRACTION: 67 %
NUC STRESS SYSTOLIC VOLUME INDEX: 39
OHS CV CPX 85 PERCENT MAX PREDICTED HEART RATE MALE: 135
OHS CV CPX ESTIMATED METS: 2
OHS CV CPX MAX PREDICTED HEART RATE: 158
OHS CV CPX PATIENT IS FEMALE: 1
OHS CV CPX PATIENT IS MALE: 0
OHS CV CPX PEAK DIASTOLIC BLOOD PRESSURE: 107 MMHG
OHS CV CPX PEAK HEAR RATE: 126 BPM
OHS CV CPX PEAK RATE PRESSURE PRODUCT: NORMAL
OHS CV CPX PEAK SYSTOLIC BLOOD PRESSURE: 198 MMHG
OHS CV CPX PERCENT MAX PREDICTED HEART RATE ACHIEVED: 80
OHS CV CPX RATE PRESSURE PRODUCT PRESENTING: NORMAL
RETIRED EF AND QEF - SEE NOTES: 53 %
STRESS ECHO POST EXERCISE DUR MIN: 3 MINUTES
STRESS ECHO POST EXERCISE DUR SEC: 1 SECONDS
SYSTOLIC BLOOD PRESSURE: 174 MMHG

## 2023-08-04 NOTE — PROGRESS NOTES
CHIEF COMPLAINT:   Chief Complaint   Patient presents with    F/U visit for Stress test results     Patient c/o having SOB on exertion relieved with rest                                                  HPI:  Fe James 53 y.o. female with HTN and CKD who presents to clinic for results of testing.  The patient was last seen as an initial referral for exertional dyspnea that has been ongoing for the past year with acute worsening over the past 6 months.  Echocardiogram obtained on 23 revealed preserved EF of 65%, normal diastolic function and no significant valvular abnormalities.  Seven day event monitor obtained in 2023 revealed underlying normal sinus rhythm with no significant arrhythmias noted.  The patient had a nuclear stress test on 23 that was equivocal due to LBBB, with a mild-to-moderate intensity, small to moderate-sized, equivocal mostly fixed perfusion abnormality with some reversibility in the basal to mid septal wall. (See reports below).     Patient presents today with continued complaints of exertional dyspnea with minimal to moderate activity.  She also reports one episode of left-sided sticking sharp chest pain that was relieved with sublingual nitroglycerin.  However, she denies any further episodes.  She continues to endorse intermittent palpitations but notes some improvement.  She denies any orthopnea, peripheral edema, PND, claudication symptoms or syncope.  She remains compliant with her current medication is tolerating well.                                                                                                                                                                                        Social History:  Denies any Illicit drug, ETOH or tobacco use  Family History: Mom-  of massive CVA at age 56-; Dad-  of CVA at age 72, Brother- Downs Syndrome-enlarged heart: sister-  of brain aneurysm at age 16                                                                                                                                                                                                                                                                                                  CARDIAC TESTING:   Nuclear Stress Test 7.27.23    Equivocal myocardial perfusion scan.    There is a mild to moderate intensity, small to moderate sized, equivocal mostly fixed perfusion abnormality with some reversibilty in the basal to mid septal wall(s). This finding is equivocal due to LBBB.    There are no other significant perfusion abnormalities.    The gated perfusion images showed an ejection fraction of 51% at rest. The gated perfusion images showed an ejection fraction of 67% post stress. Normal ejection fraction is greater than 53%.    The patient reported no chest pain during the stress test.       ECHO 7.27.23  Concentric hypertrophy and normal systolic function.  The estimated ejection fraction is 65%.  Normal left ventricular diastolic function.  Normal right ventricular size.  Normal central venous pressure (3 mmHg).  The estimated PA systolic pressure is 16 mmHg.    7 day Event Monitor 7.3.23-7.9.23  Hancock present included: <1% Bradycardia, <1 Tachycardia (1 episode), <1% Supraventricular (2 episodes), 2.24% Ventricular (9 episodes, 94.66% NSR (3 episodes), 2.87% Other Hancock    Physician Interpretation  Underlying rhythm is sinus.  During symptoms of chest discomfort the patient is in sinus with HR 92-94.  During manually marked events (but no symptom reported) the patient is in sinus with HR .  No significant arrhythmias noted         Patient Active Problem List   Diagnosis    Primary hypertension    Elevated uric acid in blood    Heart murmur    Stage 3 chronic kidney disease    Encounter for colorectal cancer screening    Murmur    SOB (shortness of breath) on exertion    Obesity    Plantar fasciitis    Sciatica     Past Surgical History:   Procedure  Laterality Date    BILATERAL TUBAL LIGATION      TONSILLECTOMY       Social History     Socioeconomic History    Marital status:     Highest education level: Some college, no degree   Occupational History    Occupation: stor manager at [employer]   Tobacco Use    Smoking status: Never    Smokeless tobacco: Never   Substance and Sexual Activity    Alcohol use: Not Currently    Drug use: Never    Sexual activity: Not Currently     Partners: Male     Social Determinants of Health     Financial Resource Strain: Low Risk  (10/5/2022)    Overall Financial Resource Strain (CARDIA)     Difficulty of Paying Living Expenses: Not hard at all   Food Insecurity: No Food Insecurity (10/5/2022)    Hunger Vital Sign     Worried About Running Out of Food in the Last Year: Never true     Ran Out of Food in the Last Year: Never true   Transportation Needs: No Transportation Needs (10/5/2022)    PRAPARE - Transportation     Lack of Transportation (Medical): No     Lack of Transportation (Non-Medical): No   Physical Activity: Inactive (10/5/2022)    Exercise Vital Sign     Days of Exercise per Week: 0 days     Minutes of Exercise per Session: 0 min   Stress: No Stress Concern Present (10/5/2022)    [employer] [place] [employer] - Occupational Stress Questionnaire     Feeling of Stress : Not at all   Social Connections: Moderately Integrated (10/5/2022)    Social Connection and Isolation Panel [NHANES]     Frequency of Communication with Friends and Family: Twice a week     Frequency of Social Gatherings with Friends and Family: Once a week     Attends Oriental orthodox Services: More than 4 times per year     Active Member of Clubs or Organizations: Yes     Attends Club or Organization Meetings: Never     Marital Status:    Housing Stability: Low Risk  (10/5/2022)    Housing Stability Vital Sign     Unable to Pay for Housing in the Last Year: No     Number of Places Lived in the Last Year: 1     Unstable Housing in  "the Last Year: No        Family History   Problem Relation Age of Onset    Kidney disease Mother     Stroke Mother     Stroke Father     Aneurysm Sister     Hypertension Sister     Kidney disease Brother     Down syndrome Brother     No Known Problems Brother      Review of patient's allergies indicates:  No Known Allergies      ROS:  Review of Systems   Constitutional: Negative.    HENT: Negative.     Eyes: Negative.    Respiratory:  Positive for shortness of breath.    Cardiovascular:  Positive for chest pain, palpitations and leg swelling.   Gastrointestinal: Negative.    Genitourinary: Negative.    Musculoskeletal: Negative.    Skin: Negative.    Neurological: Negative.    Endo/Heme/Allergies: Negative.    Psychiatric/Behavioral: Negative.                                                                                                                                                                                  Negative except as stated in the history of present illness. See HPI for details.    PHYSICAL EXAM:  Visit Vitals  /89 (BP Location: Right arm, Patient Position: Sitting, BP Method: Large (Automatic))   Pulse 70   Temp 98 °F (36.7 °C) (Oral)   Resp 20   Ht 5' 9" (1.753 m)   Wt 98.7 kg (217 lb 9.5 oz)   SpO2 99%   BMI 32.13 kg/m²         Physical Exam  HENT:      Head: Normocephalic.      Nose: Nose normal.   Eyes:      Pupils: Pupils are equal, round, and reactive to light.   Cardiovascular:      Rate and Rhythm: Normal rate and regular rhythm.      Heart sounds: Murmur heard.   Pulmonary:      Effort: Pulmonary effort is normal.   Abdominal:      General: Abdomen is flat. Bowel sounds are normal.      Palpations: Abdomen is soft.   Musculoskeletal:         General: Normal range of motion.      Cervical back: Normal range of motion.   Skin:     General: Skin is warm.   Neurological:      General: No focal deficit present.      Mental Status: She is alert.   Psychiatric:         Mood and Affect: " Mood normal.       Current Outpatient Medications   Medication Instructions    allopurinoL (ZYLOPRIM) 100 mg, Oral, Daily    amLODIPine (NORVASC) 10 mg, Oral, Daily    cloNIDine (CATAPRES) 0.1 mg, Oral, Daily PRN, If BP SYSTOLOIC 170 AND DYASTOLIC 100.    hydrALAZINE (APRESOLINE) 10 mg, Oral, 2 times daily    losartan (COZAAR) 50 mg, Oral, 2 times daily    nitroGLYCERIN (NITROSTAT) 0.4 mg, Sublingual, Every 5 min PRN        All medications, laboratory studies, cardiac diagnostic imaging reviewed.     Lab Results   Component Value Date    LDL 97.00 10/05/2022    LDL 93.00 06/16/2021    TRIG 117 10/05/2022    TRIG 111 06/16/2021    CREATININE 1.96 (H) 05/01/2023    MG 2.35 12/28/2020    K 4.4 05/01/2023        ASSESSMENT/PLAN:  Equivocal stress test  Chest Pain   SOB with exertion   - mild to moderate intensity, small to moderate sized, equivocal mostly fixed perfusion abnormality with some reversibilty in the basal to mid septal wall(s). This finding is equivocal due to LBBB.(7.27.23)  - Continues to endorse shortness of breath with minimal to moderate exertion. - Reports one episode of left sided aching chest pain that was relieved with sublingual nitroglycerin.  Denies any other episodes   - Discussed Nuclear stress test finding with staff interventional cardiologist, Dr. Treviño with recommendations for medical management for now. Patient also recommended to have workup to evaluate for other causes of symptoms, such as PFTs. Will defer to PCP  - Continue amlodipine and SL nitro prn CP, Adding Coreg 3.125 mg BID  - Nurse visit in 3-4 weeks for BP and symptom check   - Strict ED precautions provided     SOB with exertion  - Reports ongoing shortness of breath with minimal to moderate exertion  - LVEF -65%, normal DD, and no significant valvular abnormalities per ECHO 7.27.23  - Consider PFTs, Defer to PCP     Palpitations   - Reports intermittent palpitations in which she feels her heart race.  - 7 day event  monitor- underlying normal sinus rhythm with no significant arrhythmias noted (July 2023)  - Adding Coreg 3.125 mg BID  - Advised to avoid/decrease caffeine intake and remain well hydrated        HTN  - /89 today. Home log reveals SBP ranging from 130s-150s at [place]  - Continue losartan 50 mg BID, hydralazine 10 mg b.i.d., and amlodipine 10 mg. Adding Coreg 3.125 mg BID for BP and symptom managment  - Has prn clonidine per PCP.   - Continue management per PCP       CKD  - Management per nephrology       Add Coreg 3.125 mg BID   Nurse visit in 3-4 weeks for BP and symptom check   Follow-up in cardiology clinic in 3 months or sooner if needed  Follow-up with PCP/nephrology as directed  ED precautions

## 2023-08-08 ENCOUNTER — OFFICE VISIT (OUTPATIENT)
Dept: CARDIOLOGY | Facility: CLINIC | Age: 53
End: 2023-08-08
Payer: COMMERCIAL

## 2023-08-08 VITALS
TEMPERATURE: 98 F | HEIGHT: 69 IN | BODY MASS INDEX: 32.24 KG/M2 | SYSTOLIC BLOOD PRESSURE: 130 MMHG | OXYGEN SATURATION: 99 % | DIASTOLIC BLOOD PRESSURE: 89 MMHG | WEIGHT: 217.63 LBS | RESPIRATION RATE: 20 BRPM | HEART RATE: 70 BPM

## 2023-08-08 DIAGNOSIS — R06.09 DOE (DYSPNEA ON EXERTION): Primary | ICD-10-CM

## 2023-08-08 DIAGNOSIS — I10 PRIMARY HYPERTENSION: ICD-10-CM

## 2023-08-08 DIAGNOSIS — N18.30 STAGE 3 CHRONIC KIDNEY DISEASE, UNSPECIFIED WHETHER STAGE 3A OR 3B CKD: ICD-10-CM

## 2023-08-08 DIAGNOSIS — R06.02 SOB (SHORTNESS OF BREATH) ON EXERTION: ICD-10-CM

## 2023-08-08 PROCEDURE — 99214 PR OFFICE/OUTPT VISIT, EST, LEVL IV, 30-39 MIN: ICD-10-PCS | Mod: S$PBB,,, | Performed by: NURSE PRACTITIONER

## 2023-08-08 PROCEDURE — 3066F NEPHROPATHY DOC TX: CPT | Mod: CPTII,,, | Performed by: NURSE PRACTITIONER

## 2023-08-08 PROCEDURE — 4010F PR ACE/ARB THEARPY RXD/TAKEN: ICD-10-PCS | Mod: CPTII,,, | Performed by: NURSE PRACTITIONER

## 2023-08-08 PROCEDURE — 1160F RVW MEDS BY RX/DR IN RCRD: CPT | Mod: CPTII,,, | Performed by: NURSE PRACTITIONER

## 2023-08-08 PROCEDURE — 1160F PR REVIEW ALL MEDS BY PRESCRIBER/CLIN PHARMACIST DOCUMENTED: ICD-10-PCS | Mod: CPTII,,, | Performed by: NURSE PRACTITIONER

## 2023-08-08 PROCEDURE — 3079F DIAST BP 80-89 MM HG: CPT | Mod: CPTII,,, | Performed by: NURSE PRACTITIONER

## 2023-08-08 PROCEDURE — 3008F BODY MASS INDEX DOCD: CPT | Mod: CPTII,,, | Performed by: NURSE PRACTITIONER

## 2023-08-08 PROCEDURE — 3075F PR MOST RECENT SYSTOLIC BLOOD PRESS GE 130-139MM HG: ICD-10-PCS | Mod: CPTII,,, | Performed by: NURSE PRACTITIONER

## 2023-08-08 PROCEDURE — 1159F PR MEDICATION LIST DOCUMENTED IN MEDICAL RECORD: ICD-10-PCS | Mod: CPTII,,, | Performed by: NURSE PRACTITIONER

## 2023-08-08 PROCEDURE — 3066F PR DOCUMENTATION OF TREATMENT FOR NEPHROPATHY: ICD-10-PCS | Mod: CPTII,,, | Performed by: NURSE PRACTITIONER

## 2023-08-08 PROCEDURE — 99214 OFFICE O/P EST MOD 30 MIN: CPT | Mod: PBBFAC | Performed by: NURSE PRACTITIONER

## 2023-08-08 PROCEDURE — 3008F PR BODY MASS INDEX (BMI) DOCUMENTED: ICD-10-PCS | Mod: CPTII,,, | Performed by: NURSE PRACTITIONER

## 2023-08-08 PROCEDURE — 99214 OFFICE O/P EST MOD 30 MIN: CPT | Mod: S$PBB,,, | Performed by: NURSE PRACTITIONER

## 2023-08-08 PROCEDURE — 3075F SYST BP GE 130 - 139MM HG: CPT | Mod: CPTII,,, | Performed by: NURSE PRACTITIONER

## 2023-08-08 PROCEDURE — 1159F MED LIST DOCD IN RCRD: CPT | Mod: CPTII,,, | Performed by: NURSE PRACTITIONER

## 2023-08-08 PROCEDURE — 4010F ACE/ARB THERAPY RXD/TAKEN: CPT | Mod: CPTII,,, | Performed by: NURSE PRACTITIONER

## 2023-08-08 PROCEDURE — 3079F PR MOST RECENT DIASTOLIC BLOOD PRESSURE 80-89 MM HG: ICD-10-PCS | Mod: CPTII,,, | Performed by: NURSE PRACTITIONER

## 2023-08-08 RX ORDER — METOPROLOL SUCCINATE 25 MG/1
12.5 TABLET, EXTENDED RELEASE ORAL DAILY
Qty: 15 TABLET | Refills: 11 | Status: SHIPPED | OUTPATIENT
Start: 2023-08-08 | End: 2023-08-08

## 2023-08-08 RX ORDER — CARVEDILOL 3.12 MG/1
3.12 TABLET ORAL 2 TIMES DAILY WITH MEALS
Qty: 60 TABLET | Refills: 11 | Status: SHIPPED | OUTPATIENT
Start: 2023-08-08 | End: 2023-10-12

## 2023-08-08 NOTE — PATIENT INSTRUCTIONS
Add Coreg 3.125 mg BID   Nurse visit in 3-4 weeks for BP and symptom check   Follow-up in cardiology clinic in 3 months or sooner if needed  Follow-up with PCP/nephrology as directed  ED precautions

## 2023-08-10 DIAGNOSIS — I10 PRIMARY HYPERTENSION: ICD-10-CM

## 2023-08-10 RX ORDER — HYDRALAZINE HYDROCHLORIDE 10 MG/1
10 TABLET, FILM COATED ORAL 2 TIMES DAILY
Qty: 180 TABLET | Refills: 3 | Status: SHIPPED | OUTPATIENT
Start: 2023-08-10 | End: 2023-10-26

## 2023-08-28 ENCOUNTER — TELEPHONE (OUTPATIENT)
Dept: NEPHROLOGY | Facility: CLINIC | Age: 53
End: 2023-08-28
Payer: COMMERCIAL

## 2023-08-30 ENCOUNTER — TELEPHONE (OUTPATIENT)
Dept: NEPHROLOGY | Facility: CLINIC | Age: 53
End: 2023-08-30
Payer: COMMERCIAL

## 2023-09-05 ENCOUNTER — OFFICE VISIT (OUTPATIENT)
Dept: NEPHROLOGY | Facility: CLINIC | Age: 53
End: 2023-09-05
Payer: COMMERCIAL

## 2023-09-05 VITALS
OXYGEN SATURATION: 98 % | TEMPERATURE: 98 F | WEIGHT: 218.81 LBS | DIASTOLIC BLOOD PRESSURE: 86 MMHG | RESPIRATION RATE: 18 BRPM | HEIGHT: 69 IN | SYSTOLIC BLOOD PRESSURE: 144 MMHG | BODY MASS INDEX: 32.41 KG/M2 | HEART RATE: 65 BPM

## 2023-09-05 DIAGNOSIS — I10 PRIMARY HYPERTENSION: ICD-10-CM

## 2023-09-05 DIAGNOSIS — N18.32 CKD STAGE G3B/A3, GFR 30-44 AND ALBUMIN CREATININE RATIO >300 MG/G: Primary | ICD-10-CM

## 2023-09-05 PROCEDURE — 3079F PR MOST RECENT DIASTOLIC BLOOD PRESSURE 80-89 MM HG: ICD-10-PCS | Mod: CPTII,,, | Performed by: NURSE PRACTITIONER

## 2023-09-05 PROCEDURE — 3008F PR BODY MASS INDEX (BMI) DOCUMENTED: ICD-10-PCS | Mod: CPTII,,, | Performed by: NURSE PRACTITIONER

## 2023-09-05 PROCEDURE — 3077F SYST BP >= 140 MM HG: CPT | Mod: CPTII,,, | Performed by: NURSE PRACTITIONER

## 2023-09-05 PROCEDURE — 3066F PR DOCUMENTATION OF TREATMENT FOR NEPHROPATHY: ICD-10-PCS | Mod: CPTII,,, | Performed by: NURSE PRACTITIONER

## 2023-09-05 PROCEDURE — 99215 OFFICE O/P EST HI 40 MIN: CPT | Mod: PBBFAC | Performed by: NURSE PRACTITIONER

## 2023-09-05 PROCEDURE — 99214 PR OFFICE/OUTPT VISIT, EST, LEVL IV, 30-39 MIN: ICD-10-PCS | Mod: S$PBB,,, | Performed by: NURSE PRACTITIONER

## 2023-09-05 PROCEDURE — 1159F MED LIST DOCD IN RCRD: CPT | Mod: CPTII,,, | Performed by: NURSE PRACTITIONER

## 2023-09-05 PROCEDURE — 1159F PR MEDICATION LIST DOCUMENTED IN MEDICAL RECORD: ICD-10-PCS | Mod: CPTII,,, | Performed by: NURSE PRACTITIONER

## 2023-09-05 PROCEDURE — 3066F NEPHROPATHY DOC TX: CPT | Mod: CPTII,,, | Performed by: NURSE PRACTITIONER

## 2023-09-05 PROCEDURE — 3079F DIAST BP 80-89 MM HG: CPT | Mod: CPTII,,, | Performed by: NURSE PRACTITIONER

## 2023-09-05 PROCEDURE — 4010F PR ACE/ARB THEARPY RXD/TAKEN: ICD-10-PCS | Mod: CPTII,,, | Performed by: NURSE PRACTITIONER

## 2023-09-05 PROCEDURE — 4010F ACE/ARB THERAPY RXD/TAKEN: CPT | Mod: CPTII,,, | Performed by: NURSE PRACTITIONER

## 2023-09-05 PROCEDURE — 99214 OFFICE O/P EST MOD 30 MIN: CPT | Mod: S$PBB,,, | Performed by: NURSE PRACTITIONER

## 2023-09-05 PROCEDURE — 3008F BODY MASS INDEX DOCD: CPT | Mod: CPTII,,, | Performed by: NURSE PRACTITIONER

## 2023-09-05 PROCEDURE — 3077F PR MOST RECENT SYSTOLIC BLOOD PRESSURE >= 140 MM HG: ICD-10-PCS | Mod: CPTII,,, | Performed by: NURSE PRACTITIONER

## 2023-09-05 RX ORDER — METOPROLOL SUCCINATE 25 MG/1
12.5 TABLET, EXTENDED RELEASE ORAL
COMMUNITY
Start: 2023-08-08 | End: 2023-09-05

## 2023-09-05 NOTE — PROGRESS NOTES
"[facility]  Nephrology Clinic Note    Chief complaint: Chronic Kidney Disease (RTC, took meds, dyspnea, lle lump with mild burning)    History of present illness:   Fe James is a 53 y.o. Black or  female with past medical history of chronic kidney disease, hypertension, hyperuricemia, and obesity. Presents for follow-up appointment, denies complaints.    Review of Systems  12 point review of systems conducted, negative except as stated in the history of present illness.    Allergies: Patient has No Known Allergies.     Past Medical History:  has a past medical history of CKD (chronic kidney disease) stage 3, GFR 30-59 ml/min, Gout, unspecified, HTN (hypertension), and Murmur, cardiac.    Procedure History:  has a past surgical history that includes Bilateral tubal ligation and Tonsillectomy.    Family History: family history includes Aneurysm in her sister; Down syndrome in her brother; Hypertension in her sister; Kidney disease in her brother and mother; No Known Problems in her brother; Stroke in her father and mother.    Social History:  reports that she has never smoked. She has never used smokeless tobacco. She reports that she does not currently use alcohol. She reports that she does not use drugs.    Physical exam  BP (!) 144/86 (BP Location: Right arm, Patient Position: Sitting, BP Method: Large (Manual))   Pulse 65   Temp 98 °F (36.7 °C) (Oral)   Resp 18   Ht 5' 8.9" (1.75 m)   Wt 99.2 kg (218 lb 12.8 oz)   SpO2 98%   BMI 32.41 kg/m²   General appearance: Patient is in no acute distress.  Skin: No rashes or wounds.  HEENT: PERRLA, EOMI, no scleral icterus, no JVD. Neck is supple.  Chest: Respirations are unlabored. Lungs sounds are clear.   Heart: S1, S2.   Abdomen: Benign.  : Deferred.  Extremities: No edema, peripheral pulses are palpable.   Neuro: No focal deficits.     Home Medications:    Current Outpatient Medications:     allopurinoL " (ZYLOPRIM) 100 MG tablet, Take 1 tablet (100 mg total) by mouth once daily., Disp: 90 tablet, Rfl: 3    amLODIPine (NORVASC) 10 MG tablet, Take 1 tablet (10 mg total) by mouth once daily., Disp: 90 tablet, Rfl: 3    cloNIDine (CATAPRES) 0.1 MG tablet, Take 1 tablet (0.1 mg total) by mouth daily as needed (elevated blood pressure). If BP SYSTOLOIC 170 AND DYASTOLIC 100., Disp: 90 tablet, Rfl: 0    hydrALAZINE (APRESOLINE) 10 MG tablet, TAKE ONE TABLET BY MOUTH TWICE DAILY, Disp: 180 tablet, Rfl: 3    losartan (COZAAR) 50 MG tablet, Take 1 tablet (50 mg total) by mouth 2 (two) times daily., Disp: 180 tablet, Rfl: 3    nitroGLYCERIN (NITROSTAT) 0.4 MG SL tablet, Place 1 tablet (0.4 mg total) under the tongue every 5 (five) minutes as needed., Disp: 25 tablet, Rfl: 3    carvediloL (COREG) 3.125 MG tablet, Take 1 tablet (3.125 mg total) by mouth 2 (two) times daily with meals. (Patient not taking: Reported on 9/5/2023), Disp: 60 tablet, Rfl: 11    metoprolol succinate (TOPROL-XL) 25 MG 24 hr tablet, Take 12.5 mg by mouth., Disp: , Rfl:     Laboratory data    Serum  Lab Results   Component Value Date    WBC 4.52 05/01/2023    HGB 14.3 05/01/2023    HCT 44.5 05/01/2023     05/01/2023     (H) 09/05/2023    K 4.2 09/05/2023    CHLORIDE 113 (H) 09/05/2023    CO2 25 09/05/2023    BUN 41.5 (H) 09/05/2023    CREATININE 2.17 (H) 09/05/2023    EGFRNORACEVR 26 09/05/2023    GLUCOSE 51 (L) 09/05/2023    CALCIUM 9.6 09/05/2023    ALKPHOS 112 09/05/2023    LABPROT 7.4 09/05/2023    ALBUMIN 3.9 09/05/2023    BILIDIR 0.1 06/16/2021    IBILI 0.20 06/16/2021    AST 13 09/05/2023    ALT 10 09/05/2023    MG 2.35 12/28/2020    PHOS 2.9 12/28/2020      Lab Results   Component Value Date    HGBA1C 5.2 10/05/2022    .1 (H) 12/28/2020    XQQBBJYJ23HF 54.1 12/28/2020    HIV Nonreactive 06/04/2020    HEPCAB Nonreactive 06/04/2020    HEPBSURFAG Nonreactive 06/04/2020     Urine:  Lab Results   Component Value Date    COLORUA  Light-Yellow 05/01/2023    APPEARANCEUA Turbid (A) 05/01/2023    SGUA 1.013 05/01/2023    PHUA 5.5 05/01/2023    PROTEINUA 2+ (A) 05/01/2023    GLUCOSEUA Normal 05/01/2023    KETONESUA Negative 05/01/2023    BLOODUA 1+ (A) 05/01/2023    NITRITESUA Negative 05/01/2023    LEUKOCYTESUR 250 (A) 05/01/2023    RBCUA 0-5 05/01/2023    WBCUA 3-5 05/01/2023    BACTERIA Trace (A) 05/01/2023    SQEPUA Many 05/01/2023    HYALINECASTS 0-2 (A) 06/16/2021    CREATRANDUR 107.9 05/01/2023    PROTEINURINE 108.3 05/01/2023    UPROTCREA 1.0 05/01/2023         Imaging  US Retroperitoneal Limited 03/03/2020  The right kidney measures 9 cm and the left kidney measures 9 cm in length. There is heterogeneous renal parenchymal echogenicity with loss of corticomedullary differentiation. There is no hydronephrosis. There is a 2 cm cyst of the left kidney.    Impression  Findings suggestive of medical renal disease.      Impression and plan     CKD stage G3b/A3, GFR 30-44 and albumin creatinine ratio >300 mg/g  -     Comprehensive Metabolic Panel; Future; Expected date: 10/23/2023  -     Protein/Creatinine Ratio, Urine; Future; Expected date: 10/23/2023  -     Urinalysis, Reflex to Urine Culture; Future; Expected date: 10/23/2023    Possibly hypertensive kidney disease.  Proteinuria evaluation is pending (OREN, ANCA, SPEP, HIV and hepatitis panel). Patient has family history of kidney disease.  Based on patient's medical history, family history, and previous test results, her risk to test positive for inherited cause of CKD is increased.   After discussing the risks, benefits, and limitations of genetic testing, patient elected to proceed with [facility] panel. Based on the NCCN guidelines, patient meets the clinical criteria required for coverage of multi-gene panel test. The results could affect her clinical management recommendations.  Continue risk factor management and periodic monitoring.   Follow up in about 8 weeks (around  10/31/2023).    Primary hypertension  Blood pressure reading is at goal, continue current antihypertensive regimen and 2 g a day dietary sodium restriction.      BMI 32.0-32.9,adult  Lifestyle and dietary interventions discussed, patient encouraged to maintain non-sedentary lifestyle and well-balanced diet.      9/5/2023  Jazmin Huggins NP  [facility] Nephrology

## 2023-09-18 ENCOUNTER — TELEPHONE (OUTPATIENT)
Dept: RESEARCH | Facility: HOSPITAL | Age: 53
End: 2023-09-18
Payer: COMMERCIAL

## 2023-09-18 ENCOUNTER — PATIENT MESSAGE (OUTPATIENT)
Dept: RESEARCH | Facility: HOSPITAL | Age: 53
End: 2023-09-18
Payer: COMMERCIAL

## 2023-09-18 NOTE — TELEPHONE ENCOUNTER
Date: 9-18-23  Time 8:26    [id number]  IRB: 2022.074   PI: Dr. Huey Wang  [facility] Clinical Trial    Subject: Pt. Recruitment phone call    Spoke with Mrs James today regarding her potential to qualify for the trial. She was interested in driving in to [place] for a screening visit to test for eligibility. [facility] ICF, Visit by Visit guide, and Pt information Brochure. Tentatively planned to full screen on Oct- @ 9 a.m.     -Efren Duff, CRC

## 2023-10-09 ENCOUNTER — OFFICE VISIT (OUTPATIENT)
Dept: GYNECOLOGY | Facility: CLINIC | Age: 53
End: 2023-10-09
Payer: COMMERCIAL

## 2023-10-09 VITALS
SYSTOLIC BLOOD PRESSURE: 147 MMHG | OXYGEN SATURATION: 100 % | HEART RATE: 61 BPM | DIASTOLIC BLOOD PRESSURE: 96 MMHG | RESPIRATION RATE: 20 BRPM | HEIGHT: 69 IN | WEIGHT: 217.19 LBS | TEMPERATURE: 98 F | BODY MASS INDEX: 32.17 KG/M2

## 2023-10-09 DIAGNOSIS — Z12.31 VISIT FOR SCREENING MAMMOGRAM: ICD-10-CM

## 2023-10-09 DIAGNOSIS — Z12.11 COLON CANCER SCREENING: ICD-10-CM

## 2023-10-09 DIAGNOSIS — Z12.4 PAP SMEAR FOR CERVICAL CANCER SCREENING: Primary | ICD-10-CM

## 2023-10-09 PROCEDURE — 4010F ACE/ARB THERAPY RXD/TAKEN: CPT | Mod: CPTII,,, | Performed by: NURSE PRACTITIONER

## 2023-10-09 PROCEDURE — 1159F MED LIST DOCD IN RCRD: CPT | Mod: CPTII,,, | Performed by: NURSE PRACTITIONER

## 2023-10-09 PROCEDURE — 3080F PR MOST RECENT DIASTOLIC BLOOD PRESSURE >= 90 MM HG: ICD-10-PCS | Mod: CPTII,,, | Performed by: NURSE PRACTITIONER

## 2023-10-09 PROCEDURE — 3077F SYST BP >= 140 MM HG: CPT | Mod: CPTII,,, | Performed by: NURSE PRACTITIONER

## 2023-10-09 PROCEDURE — 3008F BODY MASS INDEX DOCD: CPT | Mod: CPTII,,, | Performed by: NURSE PRACTITIONER

## 2023-10-09 PROCEDURE — 99214 OFFICE O/P EST MOD 30 MIN: CPT | Mod: PBBFAC | Performed by: NURSE PRACTITIONER

## 2023-10-09 PROCEDURE — 99396 PREV VISIT EST AGE 40-64: CPT | Mod: S$PBB,,, | Performed by: NURSE PRACTITIONER

## 2023-10-09 PROCEDURE — 99396 PR PREVENTIVE VISIT,EST,40-64: ICD-10-PCS | Mod: S$PBB,,, | Performed by: NURSE PRACTITIONER

## 2023-10-09 PROCEDURE — 3066F PR DOCUMENTATION OF TREATMENT FOR NEPHROPATHY: ICD-10-PCS | Mod: CPTII,,, | Performed by: NURSE PRACTITIONER

## 2023-10-09 PROCEDURE — 4010F PR ACE/ARB THEARPY RXD/TAKEN: ICD-10-PCS | Mod: CPTII,,, | Performed by: NURSE PRACTITIONER

## 2023-10-09 PROCEDURE — 3066F NEPHROPATHY DOC TX: CPT | Mod: CPTII,,, | Performed by: NURSE PRACTITIONER

## 2023-10-09 PROCEDURE — 3077F PR MOST RECENT SYSTOLIC BLOOD PRESSURE >= 140 MM HG: ICD-10-PCS | Mod: CPTII,,, | Performed by: NURSE PRACTITIONER

## 2023-10-09 PROCEDURE — 87624 HPV HI-RISK TYP POOLED RSLT: CPT

## 2023-10-09 PROCEDURE — 3008F PR BODY MASS INDEX (BMI) DOCUMENTED: ICD-10-PCS | Mod: CPTII,,, | Performed by: NURSE PRACTITIONER

## 2023-10-09 PROCEDURE — 88174 CYTOPATH C/V AUTO IN FLUID: CPT | Performed by: NURSE PRACTITIONER

## 2023-10-09 PROCEDURE — 1159F PR MEDICATION LIST DOCUMENTED IN MEDICAL RECORD: ICD-10-PCS | Mod: CPTII,,, | Performed by: NURSE PRACTITIONER

## 2023-10-09 PROCEDURE — 3080F DIAST BP >= 90 MM HG: CPT | Mod: CPTII,,, | Performed by: NURSE PRACTITIONER

## 2023-10-09 NOTE — PROGRESS NOTES
"  Subjective:       Patient ID: Fe James is a 53 y.o. female.    Chief Complaint:  Gynecologic Exam      History of Present Illness  The patient  here for annual exam. Pt is postmenopausal x3 years. Denies history of abnormal paps. Last pap 2-3 years ago in [place]. MG- 22 & BIRADS 1. Denies breast or urinary complaints. Denies pelvic pain, abnormal bleeding or discharge. Pt reports no STIs in the past and no concerns.   Denies tobacco use. Dep. screening 0. Denies fly hx of breast, ovarian, uterine or colon cancer. BP-147/96, took medications as prescribed, states r/t traffic.    GYN & OB History  No LMP recorded. Patient is postmenopausal.       Review of patient's allergies indicates:  No Known Allergies  Past Medical History:   Diagnosis Date    CKD (chronic kidney disease) stage 3, GFR 30-59 ml/min     Gout, unspecified     HTN (hypertension)     Murmur, cardiac      OB History    Para Term  AB Living   3 2     1     SAB IAB Ectopic Multiple Live Births       1          # Outcome Date GA Lbr Volodymyr/2nd Weight Sex Delivery Anes PTL Lv   3 Ectopic            2 Para            1 Para                 Review of Systems  Review of Systems    Negative except for pertinent findings for positives per HPI     Objective:    Physical Exam    BP (!) 147/96 (BP Location: Left arm, Patient Position: Sitting, BP Method: Medium (Automatic))   Pulse 61   Temp 98 °F (36.7 °C) (Oral)   Resp 20   Ht 5' 9" (1.753 m)   Wt 98.5 kg (217 lb 3.2 oz)   SpO2 100%   BMI 32.07 kg/m²   GENERAL: Well-developed female. No acute distress.    SKIN: Normal to inspection, warm and intact.  BREASTS: No rashes or erythema. No masses, lumps, discharge, tenderness.  ABDOMEN: Soft, non tender.  VULVA: General appearance normal; external genitalia with no lesions or erythema.  VAGINA: Mucosa/vaginal vault atrophic, no abnormal discharge or lesions.  CERVIX: Atrophic, no erythema or abnormal discharge.  BIMANUAL EXAM: " reveals a 12 week-sized uterus. The uterus is non tender. Desean adnexa reveal no tenderness.  PSYCHIATRIC: Patient is oriented to person, place, and time. Mood and affect are normal.    Assessment:       1. Pap smear for cervical cancer screening    2. Visit for screening mammogram    3. Colon cancer screening       Plan:   eF was seen today for gynecologic exam.    Diagnoses and all orders for this visit:    Pap smear for cervical cancer screening  -     Liquid-Based Pap Smear, Screening Screening    Visit for screening mammogram  -     Mammo Digital Screening Bilat w/ Baldomero; Future    Colon cancer screening  -     Cologuard Screening (Multitarget Stool DNA); Future  -     Cologuard Screening (Multitarget Stool DNA)    Pap today  MG ordered  Cologuard  Follow up in about 1 year (around 10/9/2024) for annual exam.

## 2023-10-10 LAB — PSYCHE PATHOLOGY RESULT: NORMAL

## 2023-10-12 ENCOUNTER — OFFICE VISIT (OUTPATIENT)
Dept: FAMILY MEDICINE | Facility: CLINIC | Age: 53
End: 2023-10-12
Payer: COMMERCIAL

## 2023-10-12 VITALS
DIASTOLIC BLOOD PRESSURE: 88 MMHG | RESPIRATION RATE: 18 BRPM | SYSTOLIC BLOOD PRESSURE: 136 MMHG | TEMPERATURE: 98 F | OXYGEN SATURATION: 97 % | HEIGHT: 69 IN | HEART RATE: 67 BPM | BODY MASS INDEX: 31.84 KG/M2 | WEIGHT: 215 LBS

## 2023-10-12 DIAGNOSIS — Z00.6 EXAMINATION OF PARTICIPANT IN CLINICAL TRIAL: ICD-10-CM

## 2023-10-12 DIAGNOSIS — N18.32 CKD STAGE G3B/A3, GFR 30-44 AND ALBUMIN CREATININE RATIO >300 MG/G: Primary | ICD-10-CM

## 2023-10-12 DIAGNOSIS — H61.22 IMPACTED CERUMEN OF LEFT EAR: ICD-10-CM

## 2023-10-12 DIAGNOSIS — I10 PRIMARY HYPERTENSION: ICD-10-CM

## 2023-10-12 DIAGNOSIS — F51.04 PSYCHOPHYSIOLOGICAL INSOMNIA: Primary | ICD-10-CM

## 2023-10-12 DIAGNOSIS — F41.9 ANXIETY AND DEPRESSION: ICD-10-CM

## 2023-10-12 DIAGNOSIS — F32.A ANXIETY AND DEPRESSION: ICD-10-CM

## 2023-10-12 PROCEDURE — 3079F PR MOST RECENT DIASTOLIC BLOOD PRESSURE 80-89 MM HG: ICD-10-PCS | Mod: CPTII,,, | Performed by: NURSE PRACTITIONER

## 2023-10-12 PROCEDURE — 3075F PR MOST RECENT SYSTOLIC BLOOD PRESS GE 130-139MM HG: ICD-10-PCS | Mod: CPTII,,, | Performed by: NURSE PRACTITIONER

## 2023-10-12 PROCEDURE — 3008F BODY MASS INDEX DOCD: CPT | Mod: CPTII,,, | Performed by: NURSE PRACTITIONER

## 2023-10-12 PROCEDURE — 1159F PR MEDICATION LIST DOCUMENTED IN MEDICAL RECORD: ICD-10-PCS | Mod: CPTII,,, | Performed by: NURSE PRACTITIONER

## 2023-10-12 PROCEDURE — 99214 PR OFFICE/OUTPT VISIT, EST, LEVL IV, 30-39 MIN: ICD-10-PCS | Mod: S$PBB,,, | Performed by: NURSE PRACTITIONER

## 2023-10-12 PROCEDURE — 1160F PR REVIEW ALL MEDS BY PRESCRIBER/CLIN PHARMACIST DOCUMENTED: ICD-10-PCS | Mod: CPTII,,, | Performed by: NURSE PRACTITIONER

## 2023-10-12 PROCEDURE — 99214 OFFICE O/P EST MOD 30 MIN: CPT | Mod: S$PBB,,, | Performed by: NURSE PRACTITIONER

## 2023-10-12 PROCEDURE — 3008F PR BODY MASS INDEX (BMI) DOCUMENTED: ICD-10-PCS | Mod: CPTII,,, | Performed by: NURSE PRACTITIONER

## 2023-10-12 PROCEDURE — 3075F SYST BP GE 130 - 139MM HG: CPT | Mod: CPTII,,, | Performed by: NURSE PRACTITIONER

## 2023-10-12 PROCEDURE — 4010F ACE/ARB THERAPY RXD/TAKEN: CPT | Mod: CPTII,,, | Performed by: NURSE PRACTITIONER

## 2023-10-12 PROCEDURE — 1159F MED LIST DOCD IN RCRD: CPT | Mod: CPTII,,, | Performed by: NURSE PRACTITIONER

## 2023-10-12 PROCEDURE — 4010F PR ACE/ARB THEARPY RXD/TAKEN: ICD-10-PCS | Mod: CPTII,,, | Performed by: NURSE PRACTITIONER

## 2023-10-12 PROCEDURE — 3066F PR DOCUMENTATION OF TREATMENT FOR NEPHROPATHY: ICD-10-PCS | Mod: CPTII,,, | Performed by: NURSE PRACTITIONER

## 2023-10-12 PROCEDURE — 99215 OFFICE O/P EST HI 40 MIN: CPT | Mod: PBBFAC | Performed by: NURSE PRACTITIONER

## 2023-10-12 PROCEDURE — 1160F RVW MEDS BY RX/DR IN RCRD: CPT | Mod: CPTII,,, | Performed by: NURSE PRACTITIONER

## 2023-10-12 PROCEDURE — 3079F DIAST BP 80-89 MM HG: CPT | Mod: CPTII,,, | Performed by: NURSE PRACTITIONER

## 2023-10-12 PROCEDURE — 3066F NEPHROPATHY DOC TX: CPT | Mod: CPTII,,, | Performed by: NURSE PRACTITIONER

## 2023-10-12 RX ORDER — HYDROXYZINE PAMOATE 25 MG/1
25 CAPSULE ORAL NIGHTLY PRN
Qty: 30 CAPSULE | Refills: 0 | Status: SHIPPED | OUTPATIENT
Start: 2023-10-12 | End: 2023-10-26 | Stop reason: DRUGHIGH

## 2023-10-12 NOTE — ASSESSMENT & PLAN NOTE
Left ear cerumen impaction removed.  Brown thick cerumen.  TM intact, no bulging, no erythema, no retraction or drainage.

## 2023-10-12 NOTE — ASSESSMENT & PLAN NOTE
PHQ-9 score is 10, yuan -7 score is 14.  Patient state she is having difficulties falling asleep as well maintaining sleep.  Patient state she tries to go to bed around 9:00 pm and she does not fall asleep until 11:00 p.m..  And she wakes up around 2:00 a.m. in the morning and unable to go back to sleep.  Patient state she think about work and her medical conditions and her mind is racing at night.  Patient denies consuming any caffeinated drink throughout the day.  Patient admit for not being physically active that is because of work restrained coming home late.  Also she endorses not eating healthy and she had gained weight lately.  Discussed with patient will initiate Rx hydroxyzine 25 mg p.o. at bedtime at least an hour prior to going to bed and to dedicate a total of 8 hours of sleep to prevent drowsiness and grogginess in the morning.  Stay hydrated with water. We hope getting enough sleep will resolve the symptoms of depression and anxiety  Discussed lifestyle modification, and exercising.  Patient to return to clinic in 2 weeks for follow-up.  Questions solicited and answered, patient verbalized and agreed to plan of care.

## 2023-10-12 NOTE — PATIENT INSTRUCTIONS
Ivan Miramontes,     If you are due for any health screening(s) below please notify me so we can arrange them to be ordered and scheduled. Most healthy patients at your age complete them, but you are free to accept or refuse.     If you can't do it, I'll definitely understand. If you can, I'd certainly appreciate it!    Tests to Keep You Healthy    Mammogram: Met on 11/15/2022  Colon Cancer Screening: ORDERED  Cervical Cancer Screening: Met on 10/9/2023  Last Blood Pressure <= 139/89 (10/12/2023): NO      Its time for your colon cancer screening     Colorectal cancer is one of the leading causes of cancer death for men and women but it doesnt have to be. Screenings can prevent colorectal cancer or find it early enough to treat and cure the disease.     Our records indicate that you may be overdue for colon cancer screening. A colonoscopy or stool screening test can help identify patients at risk for developing colon cancer. Cancer screenings save lives, so schedule yours today to stay healthy.     A colonoscopy is the preferred test for detecting colon cancer. It is needed only once every 10 years if results are negative. While you are sedated, a flexible, lighted tube with a tiny camera is inserted into the rectum and advanced through the colon to look for cancers.     An alternative screening test that is used at home and returned to the lab may also be used. It detects hidden blood in bowel movements which could indicate cancer in the colon. If results are positive, you will need a colonoscopy to determine if the blood is a sign of cancer. This type of follow up (diagnostic) colonoscopy usually requires additional copays as required by your insurance provider.     If you recently had your colon cancer screening performed outside of [facility], please let your Health care team know so that they can update your health record. Please contact your PCP if you have any questions.    Lets manage your high blood  pressure     Your blood pressure was above 140/90 today during your visit. We recommend that you schedule a nurse visit in two weeks to check your blood pressure and discuss ways to support your health goals.     You can also manage your health and record your blood pressure from the comfort of home by keeping a daily blood pressure log. These results are shared with and reviewed by your provider. Please print this form (Daily Blood Pressure Log) to assist you in keeping track of your blood pressure at home.     Schedule your nurse visit in two weeks to learn more about how to track and manage high blood pressure.    Daily Blood Pressure Log    Name:__________________________________                  Date of Birth:_________    Average Blood Pressure:  __________      Date: Time  (a.m.) Blood  Pressure: Pulse  Rate: Time  (p.m.) Blood  Pressure : Pulse  Rate:   Sample 8:37 127/83 84

## 2023-10-12 NOTE — PROGRESS NOTES
Patient Name: Fe James   : 1970  MRN: [id number]     SUBJECTIVE DATA:    CHIEF COMPLAINT:   Fe James is a 53 y.o. female who presents to clinic today with Anxiety      HPI: Fe James 53 y.o. female with HTN and CKD  has a past medical history of CKD (chronic kidney disease) stage 3, GFR 30-59 ml/min, Gout, unspecified, HTN (hypertension), and Murmur, cardiac.    Hypertension:  Initial blood pressure 147/96.  Patient was seen at cardiology clinic on 2023 by nurse practitioner Ludmila Freed.  Patient currently takes nitroglycerin 0.4 mg sublingual as needed for chest pain, losartan 50 mg p.o. b.i.d. daily, hydralazine 10 mg p.o. b.i.d., clonidine 0.1 mg as needed if blood pressure greater than 170 systolic and 100 diastolic, amlodipine 10 mg p.o. once daily.  According to Cardiology note patient was prescribed Coreg 3.125 mg p.o. b.i.d. for palpitation and blood pressure.  Blood pressure on discharge 136/88.  Also patient patient to continue to have short of breath to be referred to complete pulmonary function test.  Patient state she is not short of breath and possibly that due of not being conditioned and lately she had gained some weight.  Instructed patient if short of breath continues we can refer to complete pulmonary function test as recommended by Cardiology. Continue medication as prescribed as well follow-up as directed.  Questions solicited and answered, patient verbalized.    Depression and anxiety/insomnia:  PHQ-9 score is 10, yuan -7 score is 14.  Patient state she is having difficulties falling asleep as well maintaining sleep.  Patient state she tries to go to bed around 9:00 pm and she does not fall asleep until 11:00 p.m..  And she wakes up around 2:00 a.m. in the morning and unable to go back to sleep.  Patient state she think about work and her medical conditions and her mind is racing at night.  Patient denies consuming any caffeinated drink throughout the day.   "Patient admit for not being physically active that is because of work restrained coming home late.  Also she endorses not eating healthy and she had gained weight lately.  Discussed with patient will initiate Rx hydroxyzine 25 mg p.o. at bedtime at least an hour prior to going to bed and to dedicate a total of 8 hours of sleep to prevent drowsiness and grogginess in the morning.  Stay hydrated with water.  We hope getting enough sleep will resolve the symptoms of depression and anxiety. Discussed lifestyle modification, and exercising.  Patient to return to clinic in 2 weeks for follow-up.  Questions solicited and answered, patient verbalized and agreed to plan of care.    Patient denies chest pain, shortness of breath, dyspnea on exertion, palpitations, peripheral edema, abdominal pain, nausea, vomiting, diarrhea, constipation, fatigue, fever, chills, dysuria,  hematuria, melena, or hematochezia.       ALLERGIES: Review of patient's allergies indicates:  No Known Allergies      ROS:  Review of Systems   Psychiatric/Behavioral:  The patient has insomnia.    All other systems reviewed and are negative.        OBJECTIVE DATA:  Vital signs  Vitals:    10/12/23 1203 10/12/23 1242   BP: (!) 135/91 136/88   Pulse: 67    Resp: 18    Temp: 97.9 °F (36.6 °C)    TempSrc: Oral    SpO2: 97%    Weight: 97.5 kg (215 lb)    Height: 5' 9" (1.753 m)       Body mass index is 31.75 kg/m².    PHYSICAL EXAM:   Physical Exam  Vitals and nursing note reviewed.   Constitutional:       General: She is awake. She is not in acute distress.     Appearance: Normal appearance. She is well-developed, well-groomed and overweight. She is not ill-appearing, toxic-appearing or diaphoretic.   HENT:      Head: Normocephalic and atraumatic.      Right Ear: Tympanic membrane, ear canal and external ear normal.      Left Ear: Ear canal and external ear normal. There is impacted cerumen.      Ears:      Comments: Left ear cerumen impaction removed totally. "  Brown thick cerumen.  TM intact, no bulging, no erythema, no retraction or drainage.     Nose: Nose normal. No rhinorrhea.      Mouth/Throat:      Lips: Pink.      Mouth: Mucous membranes are moist.      Pharynx: Oropharynx is clear. Uvula midline.   Eyes:      General: Lids are normal. No scleral icterus.     Extraocular Movements: Extraocular movements intact.      Conjunctiva/sclera: Conjunctivae normal.      Pupils: Pupils are equal, round, and reactive to light.   Neck:      Trachea: Trachea and phonation normal.   Cardiovascular:      Rate and Rhythm: Normal rate and regular rhythm.      Pulses: Normal pulses.           Radial pulses are 2+ on the right side and 2+ on the left side.      Heart sounds: Murmur (Chronic) heard.   Pulmonary:      Effort: Pulmonary effort is normal.      Breath sounds: Normal breath sounds and air entry. No wheezing or rhonchi.   Abdominal:      Palpations: Abdomen is soft.      Tenderness: There is no abdominal tenderness. There is no right CVA tenderness or left CVA tenderness.   Musculoskeletal:         General: Normal range of motion.      Cervical back: Normal range of motion and neck supple. No rigidity or tenderness.   Lymphadenopathy:      Cervical: No cervical adenopathy.   Skin:     General: Skin is warm and dry.      Capillary Refill: Capillary refill takes less than 2 seconds.   Neurological:      General: No focal deficit present.      Mental Status: She is alert and oriented to person, place, and time. Mental status is at baseline.      GCS: GCS eye subscore is 4. GCS verbal subscore is 5. GCS motor subscore is 6.   Psychiatric:         Attention and Perception: Attention and perception normal.         Mood and Affect: Mood and affect normal.         Speech: Speech normal.         Behavior: Behavior normal. Behavior is cooperative.         Thought Content: Thought content normal. Thought content does not include homicidal or suicidal ideation.         Cognition and  Memory: Cognition and memory normal.         Judgment: Judgment normal.          ASSESSMENT/PLAN:  1. Psychophysiological insomnia  Assessment & Plan:  PHQ-9 score is 10, yuan -7 score is 14.  Patient state she is having difficulties falling asleep as well maintaining sleep.  Patient state she tries to go to bed around 9:00 pm and she does not fall asleep until 11:00 p.m..  And she wakes up around 2:00 a.m. in the morning and unable to go back to sleep.  Patient state she think about work and her medical conditions and her mind is racing at night.  Patient denies consuming any caffeinated drink throughout the day.  Patient admit for not being physically active that is because of work restrained coming home late.  Also she endorses not eating healthy and she had gained weight lately.  Discussed with patient will initiate Rx hydroxyzine 25 mg p.o. at bedtime at least an hour prior to going to bed and to dedicate a total of 8 hours of sleep to prevent drowsiness and grogginess in the morning.  Stay hydrated with water. We hope getting enough sleep will resolve the symptoms of depression and anxiety  Discussed lifestyle modification, and exercising.  Patient to return to clinic in 2 weeks for follow-up.  Questions solicited and answered, patient verbalized and agreed to plan of care.    Orders:  -     hydrOXYzine pamoate (VISTARIL) 25 MG Cap; Take 1 capsule (25 mg total) by mouth nightly as needed (insomnia).  Dispense: 30 capsule; Refill: 0    2. Anxiety and depression  -     hydrOXYzine pamoate (VISTARIL) 25 MG Cap; Take 1 capsule (25 mg total) by mouth nightly as needed (insomnia).  Dispense: 30 capsule; Refill: 0    3. Primary hypertension  Assessment & Plan:   Initial blood pressure 147/96.  Patient was seen at cardiology clinic on August 8, 2023 by nurse practitioner Ludmila Freed.  Patient currently takes nitroglycerin 0.4 mg sublingual as needed for chest pain, losartan 50 mg p.o. b.i.d. daily, hydralazine 10  mg p.o. b.i.d., clonidine 0.1 mg as needed if blood pressure greater than 170 systolic and 100 diastolic, amlodipine 10 mg p.o. once daily.  According to Cardiology note patient was prescribed Coreg 3.125 mg p.o. b.i.d. for palpitation and blood pressure.  Blood pressure on discharge 136/88.  Also patient patient to continue to have short of breath to be referred to complete pulmonary function test.  Patient state she is not short of breath and possibly that due of not being conditioned and lately she had gained some weight.  Instructed patient if short of breath continues we can refer to complete pulmonary function test as recommended by Cardiology. Continue medication as prescribed as well follow-up as directed.  Questions solicited and answered, patient verbalized.      4. Impacted cerumen of left ear  Assessment & Plan:  Left ear cerumen impaction removed.  Brown thick cerumen.  TM intact, no bulging, no erythema, no retraction or drainage.             RESULTS:  Recent Results (from the past 1008 hour(s))   Comprehensive Metabolic Panel    Collection Time: 09/05/23 10:53 AM   Result Value Ref Range    Sodium Level 146 (H) 136 - 145 mmol/L    Potassium Level 4.2 3.5 - 5.1 mmol/L    Chloride 113 (H) 98 - 107 mmol/L    Carbon Dioxide 25 22 - 29 mmol/L    Glucose Level 51 (L) 74 - 100 mg/dL    Blood Urea Nitrogen 41.5 (H) 9.8 - 20.1 mg/dL    Creatinine 2.17 (H) 0.55 - 1.02 mg/dL    Calcium Level Total 9.6 8.4 - 10.2 mg/dL    Protein Total 7.4 6.4 - 8.3 gm/dL    Albumin Level 3.9 3.5 - 5.0 g/dL    Globulin 3.5 2.4 - 3.5 gm/dL    Albumin/Globulin Ratio 1.1 1.1 - 2.0 ratio    Bilirubin Total 0.5 <=1.5 mg/dL    Alkaline Phosphatase 112 40 - 150 unit/L    Alanine Aminotransferase 10 0 - 55 unit/L    Aspartate Aminotransferase 13 5 - 34 unit/L    eGFR 26 mls/min/1.73/m2   Anti-Neutrophilic Cytoplasmic Antibody    Collection Time: 09/05/23 10:53 AM   Result Value Ref Range    c-ANCA Negative Negative    p-ANCA Negative  Negative   HIV 1/2 Ag/Ab (4th Gen)    Collection Time: 09/05/23 10:53 AM   Result Value Ref Range    HIV Nonreactive Nonreactive   Hepatitis Panel, Acute    Collection Time: 09/05/23 10:53 AM   Result Value Ref Range    Hepatitis A IgM Nonreactive Nonreactive    Hepatitis B Core IgM Nonreactive Nonreactive    Hepatitis B Surface Antigen Nonreactive Nonreactive    Hep C Ab Interp Nonreactive Nonreactive   Immunoglobulins (IgG, IgA, IgM) Quantitative    Collection Time: 09/05/23 10:53 AM   Result Value Ref Range    IgG Level 1,273.00 522.00 - 1,631.00 mg/dL    IgA Level 132.0 65.0 - 421.0 mg/dL    IgM Level 119.0 33.0 - 293.0 mg/dL   Immunoglobulin Free LT Chains Blood    Collection Time: 09/05/23 10:53 AM   Result Value Ref Range    Kappa Free Light Chain 52.4 (H) 0.3300 - 1.94 mg/dL    Lambda Free Light Chain 2.41 0.5700 - 2.63 mg/dL    Kappa/Lambda FLC Ratio 21.7 (H) 0.2600 - 1.65   Immunofixation & Protein Electrophoresis    Collection Time: 09/05/23 10:53 AM   Result Value Ref Range    Protein Total 7.2 6.4 - 8.3 gm/dL    Albumin Level 3.7 3.5 - 5.0 g/dL    Globulin 3.5 2.4 - 3.5 gm/dL    Albumin/Globulin Ratio 1.1 1.1 - 2.0 ratio    Albumin, SPEP 51.35     Alpha 1 Glob % 3.43     Alpha 1 Glob 0.25 gm/dl    Alpha 2 Glob% 9.81     Alpha 2 Glob 0.71 gm/dl    Beta Glob % 16.42     Beta Glob 1.18 gm/dl    Gamma Globulin % 19.00     Gamma Globulin 1.37 gm/dl    M Shaheed %      M Shaheed     OREN IgG by IFA    Collection Time: 09/05/23 10:53 AM   Result Value Ref Range    Antinuclear Ab, HEp-2 Substrate <1:80 (Negative) <1:80 (Negative)   Pathologist Interpretation    Collection Time: 09/05/23 10:53 AM   Result Value Ref Range    Pathology Review       No serological evidence of recent or past hepatitis A, B, or C infection.    Miguel Juarez M.D.   Pathologist Interpretation    Collection Time: 09/05/23 10:53 AM   Result Value Ref Range    Pathology Review       SPEP: Total protein level is within the reference range.      Serum protein electrophoresis shows normal distribution of the main protein fractions.     No monoclonal bands are detected. Serum protein electrophoresis shows no pathologic changes.    [place]: Serum immunofixation electrophoresis demonstrates polyclonal distribution of immunoglobulins.  No monoclonal immunoglobulins are detected.    Miguel Juarez M.D.   Protein/Creatinine Ratio, Urine    Collection Time: 09/05/23 11:00 AM   Result Value Ref Range    Urine Protein Level 66.2 mg/dL    Urine Creatinine 71.7 45.0 - 106.0 mg/dL    Urine Protein/Creatinine Ratio 0.9    Urinalysis, Reflex to Urine Culture    Collection Time: 09/05/23 11:00 AM    Specimen: Urine   Result Value Ref Range    Color, UA Colorless (A) Yellow, Light-Yellow, Dark Yellow, Grazyna, Straw    Appearance, UA Clear Clear    Specific Gravity, UA 1.012 1.005 - 1.030    pH, UA 5.5 5.0 - 8.5    Protein, UA 1+ (A) Negative    Glucose, UA Normal Negative, Normal    Ketones, UA Negative Negative    Blood, UA Negative Negative    Bilirubin, UA Negative Negative    Urobilinogen, UA Normal 0.2, 1.0, Normal    Nitrites, UA Negative Negative    Leukocyte Esterase, UA Negative Negative    WBC, UA 0-5 None Seen, 0-2, 3-5, 0-5 /HPF    Bacteria, UA Trace (A) None Seen /HPF    Squamous Epithelial Cells, UA Occ (A) None Seen /HPF    Hyaline Casts, UA None Seen None Seen /lpf    RBC, UA 0-5 None Seen, 0-2, 3-5, 0-5 /HPF   Liquid-Based Pap Smear, Screening Screening    Collection Time: 10/09/23  8:51 AM   Result Value Ref Range    Pathology Result           Follow Up:  Follow up in about 2 weeks (around 10/26/2023).     Face to face  including documentation, chart review, counseling, education, review of test results, relevant medical records, and coordination of care.   I have explained the treatment plan, diagnosis, and prognosis to patient. All questions are answered to the best of my knowledge .   Previous medical history/lab work/radiology reviewed and considered during  medical management decisions.   Medication list reviewed and medication reconciliation performed.  Patient was provided  and care about his/her current diagnosis (es) and medications including risk/benefit and side effects/adverse events, over the counter medication uses/doses, home self-care and contact precautions,  and red flags and indications for when to seek immediate medical attention.   Patient was advised to continue compliance with current medication list and medical recommendations.  Patient dvised continued compliance with recommended eating habits/ diets for medical conditions and exercise 150 minutes/ week (if possible) for medical condition (s).  Educational handouts and instructions on selected disease management in AVS (After Visit Summary).    All of the patient's questions were answered to patient's satisfaction.   The patient was receptive, expressed verbal understanding and agreement the above plan.       This note was created with the assistance of a voice recognition software or phone dictation. There may be transcription errors as a result of using this technology however minimal. Effort has been made to assure accuracy of transcription but any obvious errors or omissions should be clarified with the author of the document

## 2023-10-12 NOTE — ASSESSMENT & PLAN NOTE
Initial blood pressure 147/96.  Patient was seen at cardiology clinic on August 8, 2023 by nurse practitioner Ludmila Freed.  Patient currently takes nitroglycerin 0.4 mg sublingual as needed for chest pain, losartan 50 mg p.o. b.i.d. daily, hydralazine 10 mg p.o. b.i.d., clonidine 0.1 mg as needed if blood pressure greater than 170 systolic and 100 diastolic, amlodipine 10 mg p.o. once daily.  According to Cardiology note patient was prescribed Coreg 3.125 mg p.o. b.i.d. for palpitation and blood pressure.  Blood pressure on discharge 136/88.  Also patient patient to continue to have short of breath to be referred to complete pulmonary function test.  Patient state she is not short of breath and possibly that due of not being conditioned and lately she had gained some weight.  Instructed patient if short of breath continues we can refer to complete pulmonary function test as recommended by Cardiology. Continue medication as prescribed as well follow-up as directed.  Questions solicited and answered, patient verbalized.

## 2023-10-13 ENCOUNTER — PATIENT MESSAGE (OUTPATIENT)
Dept: RESEARCH | Facility: HOSPITAL | Age: 53
End: 2023-10-13
Payer: COMMERCIAL

## 2023-10-13 ENCOUNTER — TELEPHONE (OUTPATIENT)
Dept: RESEARCH | Facility: HOSPITAL | Age: 53
End: 2023-10-13
Payer: COMMERCIAL

## 2023-10-13 NOTE — TELEPHONE ENCOUNTER
[id number]  PI: Dr. Huey Wang  IRB: 2022.074     Date:10/13/2023          Time:1:42        Spoke with Mrs. Fe James about potential interest in participating in the [employer] Clinical Trial ([id number]), IRB 2022.074. Patient was a bit concerned about being involved in research. Dr. Wang spoke on the phone with the patient and she agreed to come in so that we can give her more information to make an informed decision about whether or not she would like to participate. A copy of the informed consent form (ICF), as well as IRB approved patient material: [employer] Patient Information Brochure was sent to the patient via [employer].     Patient is scheduled to come in for the ICF process and potential Screening visit on 10.19.23 with Dr. Karishma Rowan, CRC

## 2023-10-19 DIAGNOSIS — Z00.6 EXAMINATION OF PARTICIPANT IN CLINICAL TRIAL: ICD-10-CM

## 2023-10-19 DIAGNOSIS — N18.32 CKD STAGE G3B/A3, GFR 30-44 AND ALBUMIN CREATININE RATIO >300 MG/G: Primary | ICD-10-CM

## 2023-10-26 ENCOUNTER — OFFICE VISIT (OUTPATIENT)
Dept: FAMILY MEDICINE | Facility: CLINIC | Age: 53
End: 2023-10-26
Payer: COMMERCIAL

## 2023-10-26 ENCOUNTER — LAB VISIT (OUTPATIENT)
Dept: LAB | Facility: HOSPITAL | Age: 53
End: 2023-10-26
Attending: NURSE PRACTITIONER
Payer: COMMERCIAL

## 2023-10-26 VITALS
TEMPERATURE: 98 F | HEIGHT: 69 IN | WEIGHT: 217 LBS | DIASTOLIC BLOOD PRESSURE: 97 MMHG | HEART RATE: 66 BPM | SYSTOLIC BLOOD PRESSURE: 159 MMHG | RESPIRATION RATE: 18 BRPM | OXYGEN SATURATION: 98 % | BODY MASS INDEX: 32.14 KG/M2

## 2023-10-26 DIAGNOSIS — F32.A ANXIETY AND DEPRESSION: ICD-10-CM

## 2023-10-26 DIAGNOSIS — F51.04 PSYCHOPHYSIOLOGICAL INSOMNIA: ICD-10-CM

## 2023-10-26 DIAGNOSIS — F41.9 ANXIETY AND DEPRESSION: ICD-10-CM

## 2023-10-26 DIAGNOSIS — Z13.31 POSITIVE DEPRESSION SCREENING: ICD-10-CM

## 2023-10-26 DIAGNOSIS — I10 PRIMARY HYPERTENSION: Primary | ICD-10-CM

## 2023-10-26 DIAGNOSIS — N18.32 CKD STAGE G3B/A3, GFR 30-44 AND ALBUMIN CREATININE RATIO >300 MG/G: ICD-10-CM

## 2023-10-26 LAB
ALBUMIN SERPL-MCNC: 4 G/DL (ref 3.5–5)
ALBUMIN/GLOB SERPL: 1.2 RATIO (ref 1.1–2)
ALP SERPL-CCNC: 117 UNIT/L (ref 40–150)
ALT SERPL-CCNC: 13 UNIT/L (ref 0–55)
APPEARANCE UR: CLEAR
AST SERPL-CCNC: 15 UNIT/L (ref 5–34)
BACTERIA #/AREA URNS AUTO: ABNORMAL /HPF
BILIRUB SERPL-MCNC: 0.6 MG/DL
BILIRUB UR QL STRIP.AUTO: NEGATIVE
BUN SERPL-MCNC: 33.9 MG/DL (ref 9.8–20.1)
CALCIUM SERPL-MCNC: 9.5 MG/DL (ref 8.4–10.2)
CHLORIDE SERPL-SCNC: 108 MMOL/L (ref 98–107)
CO2 SERPL-SCNC: 25 MMOL/L (ref 22–29)
COLOR UR AUTO: ABNORMAL
CREAT SERPL-MCNC: 2.1 MG/DL (ref 0.55–1.02)
CREAT UR-MCNC: 96.2 MG/DL (ref 45–106)
GFR SERPLBLD CREATININE-BSD FMLA CKD-EPI: 28 MLS/MIN/1.73/M2
GLOBULIN SER-MCNC: 3.3 GM/DL (ref 2.4–3.5)
GLUCOSE SERPL-MCNC: 110 MG/DL (ref 74–100)
GLUCOSE UR QL STRIP.AUTO: NORMAL
HYALINE CASTS #/AREA URNS LPF: ABNORMAL /LPF
KETONES UR QL STRIP.AUTO: NEGATIVE
LEUKOCYTE ESTERASE UR QL STRIP.AUTO: NEGATIVE
NITRITE UR QL STRIP.AUTO: NEGATIVE
PH UR STRIP.AUTO: 5.5 [PH]
POTASSIUM SERPL-SCNC: 4.6 MMOL/L (ref 3.5–5.1)
PROT SERPL-MCNC: 7.3 GM/DL (ref 6.4–8.3)
PROT UR QL STRIP.AUTO: ABNORMAL
PROT UR STRIP-MCNC: 72.2 MG/DL
RBC #/AREA URNS AUTO: ABNORMAL /HPF
RBC UR QL AUTO: NEGATIVE
SODIUM SERPL-SCNC: 141 MMOL/L (ref 136–145)
SP GR UR STRIP.AUTO: 1.01 (ref 1–1.03)
SQUAMOUS #/AREA URNS LPF: ABNORMAL /HPF
URINE PROTEIN/CREATININE RATIO (OHS): 0.8
UROBILINOGEN UR STRIP-ACNC: NORMAL
WBC #/AREA URNS AUTO: ABNORMAL /HPF

## 2023-10-26 PROCEDURE — 3008F BODY MASS INDEX DOCD: CPT | Mod: CPTII,,, | Performed by: NURSE PRACTITIONER

## 2023-10-26 PROCEDURE — 36415 COLL VENOUS BLD VENIPUNCTURE: CPT

## 2023-10-26 PROCEDURE — 1159F PR MEDICATION LIST DOCUMENTED IN MEDICAL RECORD: ICD-10-PCS | Mod: CPTII,,, | Performed by: NURSE PRACTITIONER

## 2023-10-26 PROCEDURE — 3080F DIAST BP >= 90 MM HG: CPT | Mod: CPTII,,, | Performed by: NURSE PRACTITIONER

## 2023-10-26 PROCEDURE — 1160F PR REVIEW ALL MEDS BY PRESCRIBER/CLIN PHARMACIST DOCUMENTED: ICD-10-PCS | Mod: CPTII,,, | Performed by: NURSE PRACTITIONER

## 2023-10-26 PROCEDURE — 1159F MED LIST DOCD IN RCRD: CPT | Mod: CPTII,,, | Performed by: NURSE PRACTITIONER

## 2023-10-26 PROCEDURE — 3077F PR MOST RECENT SYSTOLIC BLOOD PRESSURE >= 140 MM HG: ICD-10-PCS | Mod: CPTII,,, | Performed by: NURSE PRACTITIONER

## 2023-10-26 PROCEDURE — 1160F RVW MEDS BY RX/DR IN RCRD: CPT | Mod: CPTII,,, | Performed by: NURSE PRACTITIONER

## 2023-10-26 PROCEDURE — 99214 OFFICE O/P EST MOD 30 MIN: CPT | Mod: S$PBB,,, | Performed by: NURSE PRACTITIONER

## 2023-10-26 PROCEDURE — 4010F PR ACE/ARB THEARPY RXD/TAKEN: ICD-10-PCS | Mod: CPTII,,, | Performed by: NURSE PRACTITIONER

## 2023-10-26 PROCEDURE — 4010F ACE/ARB THERAPY RXD/TAKEN: CPT | Mod: CPTII,,, | Performed by: NURSE PRACTITIONER

## 2023-10-26 PROCEDURE — 99214 PR OFFICE/OUTPT VISIT, EST, LEVL IV, 30-39 MIN: ICD-10-PCS | Mod: S$PBB,,, | Performed by: NURSE PRACTITIONER

## 2023-10-26 PROCEDURE — 80053 COMPREHEN METABOLIC PANEL: CPT

## 2023-10-26 PROCEDURE — 81001 URINALYSIS AUTO W/SCOPE: CPT

## 2023-10-26 PROCEDURE — 3066F PR DOCUMENTATION OF TREATMENT FOR NEPHROPATHY: ICD-10-PCS | Mod: CPTII,,, | Performed by: NURSE PRACTITIONER

## 2023-10-26 PROCEDURE — 3066F NEPHROPATHY DOC TX: CPT | Mod: CPTII,,, | Performed by: NURSE PRACTITIONER

## 2023-10-26 PROCEDURE — 3080F PR MOST RECENT DIASTOLIC BLOOD PRESSURE >= 90 MM HG: ICD-10-PCS | Mod: CPTII,,, | Performed by: NURSE PRACTITIONER

## 2023-10-26 PROCEDURE — 3008F PR BODY MASS INDEX (BMI) DOCUMENTED: ICD-10-PCS | Mod: CPTII,,, | Performed by: NURSE PRACTITIONER

## 2023-10-26 PROCEDURE — 3077F SYST BP >= 140 MM HG: CPT | Mod: CPTII,,, | Performed by: NURSE PRACTITIONER

## 2023-10-26 PROCEDURE — 82570 ASSAY OF URINE CREATININE: CPT

## 2023-10-26 PROCEDURE — 99215 OFFICE O/P EST HI 40 MIN: CPT | Mod: PBBFAC | Performed by: NURSE PRACTITIONER

## 2023-10-26 RX ORDER — HYDRALAZINE HYDROCHLORIDE 25 MG/1
25 TABLET, FILM COATED ORAL 2 TIMES DAILY
Qty: 60 TABLET | Refills: 11 | Status: SHIPPED | OUTPATIENT
Start: 2023-10-26 | End: 2023-11-01

## 2023-10-26 RX ORDER — TRAZODONE HYDROCHLORIDE 50 MG/1
25 TABLET ORAL NIGHTLY
Qty: 15 TABLET | Refills: 1 | Status: SHIPPED | OUTPATIENT
Start: 2023-10-26

## 2023-10-26 RX ORDER — CITALOPRAM 20 MG/1
20 TABLET, FILM COATED ORAL DAILY
Qty: 30 TABLET | Refills: 1 | Status: SHIPPED | OUTPATIENT
Start: 2023-10-26 | End: 2024-01-09 | Stop reason: SDDI

## 2023-10-26 RX ORDER — LOSARTAN POTASSIUM 100 MG/1
100 TABLET ORAL DAILY
Qty: 90 TABLET | Refills: 3 | Status: SHIPPED | OUTPATIENT
Start: 2023-10-26 | End: 2024-10-25

## 2023-10-26 NOTE — PATIENT INSTRUCTIONS
Ivan Miramontes,     If you are due for any health screening(s) below please notify me so we can arrange them to be ordered and scheduled. Most healthy patients at your age complete them, but you are free to accept or refuse.     If you can't do it, I'll definitely understand. If you can, I'd certainly appreciate it!    Tests to Keep You Healthy    Mammogram: Met on 11/15/2022  Colon Cancer Screening: ORDERED  Cervical Cancer Screening: Met on 10/9/2023  Last Blood Pressure <= 139/89 (10/26/2023): NO      Its time for your colon cancer screening     Colorectal cancer is one of the leading causes of cancer death for men and women but it doesnt have to be. Screenings can prevent colorectal cancer or find it early enough to treat and cure the disease.     Our records indicate that you may be overdue for colon cancer screening. A colonoscopy or stool screening test can help identify patients at risk for developing colon cancer. Cancer screenings save lives, so schedule yours today to stay healthy.     A colonoscopy is the preferred test for detecting colon cancer. It is needed only once every 10 years if results are negative. While you are sedated, a flexible, lighted tube with a tiny camera is inserted into the rectum and advanced through the colon to look for cancers.     An alternative screening test that is used at home and returned to the lab may also be used. It detects hidden blood in bowel movements which could indicate cancer in the colon. If results are positive, you will need a colonoscopy to determine if the blood is a sign of cancer. This type of follow up (diagnostic) colonoscopy usually requires additional copays as required by your insurance provider.     If you recently had your colon cancer screening performed outside of [facility], please let your Health care team know so that they can update your health record. Please contact your PCP if you have any questions.    Lets manage your high blood  pressure     Your blood pressure was above 140/90 today during your visit. We recommend that you schedule a nurse visit in two weeks to check your blood pressure and discuss ways to support your health goals.     You can also manage your health and record your blood pressure from the comfort of home by keeping a daily blood pressure log. These results are shared with and reviewed by your provider. Please print this form (Daily Blood Pressure Log) to assist you in keeping track of your blood pressure at home.     Schedule your nurse visit in two weeks to learn more about how to track and manage high blood pressure.    Daily Blood Pressure Log    Name:__________________________________                  Date of Birth:_________    Average Blood Pressure:  __________      Date: Time  (a.m.) Blood  Pressure: Pulse  Rate: Time  (p.m.) Blood  Pressure : Pulse  Rate:   Sample 8:37 127/83 84

## 2023-10-26 NOTE — PROGRESS NOTES
Patient Name: Fe James   : 1970  MRN: [id number]     SUBJECTIVE DATA:    CHIEF COMPLAINT:   Fe James is a 53 y.o. female who presents to clinic today with Hypertension and Feet burning      HPI:  53-year-old female presents to the clinic to follow-up on depression, anxiety, insomnia, hypertension.       Previous visit on 2023:  Depression and anxiety/insomnia:  PHQ-9 score is 10, yuan -7 score is 14.  Patient state she is having difficulties falling asleep as well maintaining sleep.  Patient state she tries to go to bed around 9:00 pm and she does not fall asleep until 11:00 p.m..  And she wakes up around 2:00 a.m. in the morning and unable to go back to sleep.  Patient state she think about work and her medical conditions and her mind is racing at night.  Patient denies consuming any caffeinated drink throughout the day.  Patient admit for not being physically active that is because of work restrained coming home late.  Also she endorses not eating healthy and she had gained weight lately.  Discussed with patient will initiate Rx hydroxyzine 25 mg p.o. at bedtime at least an hour prior to going to bed and to dedicate a total of 8 hours of sleep to prevent drowsiness and grogginess in the morning.  Stay hydrated with water.  We hope getting enough sleep will resolve the symptoms of depression and anxiety. Discussed lifestyle modification, and exercising.  Patient to return to clinic in 2 weeks for follow-up.  Questions solicited and answered, patient verbalized and agreed to plan of care.    Today's visit 10/26/2023:  Depression and anxiety:  PHQ -9 score is 8, yuan-7 score is 14.  Patient is tearful during interview.  Patient was preserved took about was going on.  Patient agreed to be referred for counseling with behavioral health nurse practitioner bryan Contreras.  Also would like to start medications, Rx Celexa 20 mg p.o. once daily.  Discussed medication side effect.  Patient to  "return in 4 weeks or sooner for follow-up.  Patient verbalized.    Regarding insomnia patient feels hydroxyzine 25 mg was not effective.  Discussed trazodone 25 mg p.o. at bedtime.  Instructed patient to dedicate at least total of 8 hours of sleep to prevent drowsiness and grogginess in the morning.  Discussed with patient if 25 mg dose is not enough she can take 50 mg of trazodone as needed for insomnia.  Patient to keep the clinic up-to-date medication adjustment.  Questions solicited and answered, patient verbalized and agreed to plan of care.  Patient read discharge education material.    Hypertension:  Initial blood pressure 146/106 repeated prior to discharge 159/97.  Discussed with patient will increase hydralazine to 25 mg p.o. b.i.d..  Will re consolidate losartan to 1 tablet of 100 mg p.o. once daily instead of 50 mg twice a day.  Patient to continue with same dose of blood pressure medication Norvasc 10 mg daily and Coreg.  Instructed patient to keep her follow-up appointment in 2 weeks with Cardiology.  Continue to monitor blood pressure at home and notify the clinic if there is no improvement.  Questions solicited and answered, patient verbalized.  Patient read discharge education material.    Patient denies chest pain, shortness of breath, dyspnea on exertion, palpitations, peripheral edema, abdominal pain, nausea, vomiting, diarrhea, constipation, fatigue, fever, chills, dysuria,  hematuria, melena, or hematochezia.     ALLERGIES: Review of patient's allergies indicates:  No Known Allergies      ROS:  Review of Systems   Psychiatric/Behavioral:  Positive for depression. The patient is nervous/anxious and has insomnia.    All other systems reviewed and are negative.        OBJECTIVE DATA:  Vital signs  Vitals:    10/26/23 1421 10/26/23 1453   BP: (!) 146/106 (!) 159/97   Pulse: 66    Resp: 18    Temp: 97.6 °F (36.4 °C)    TempSrc: Oral    SpO2: 98%    Weight: 98.4 kg (217 lb)    Height: 5' 9" (1.753 m) "       Body mass index is 32.05 kg/m².    PHYSICAL EXAM:   Physical Exam  Vitals and nursing note reviewed.   Constitutional:       General: She is awake. She is not in acute distress.     Appearance: Normal appearance. She is well-developed, well-groomed and overweight. She is not ill-appearing, toxic-appearing or diaphoretic.   HENT:      Head: Normocephalic and atraumatic.      Right Ear: Tympanic membrane, ear canal and external ear normal.      Left Ear: Tympanic membrane, ear canal and external ear normal.      Nose: Nose normal.      Mouth/Throat:      Mouth: Mucous membranes are moist.      Pharynx: Oropharynx is clear. Uvula midline.   Eyes:      General: Lids are normal.      Extraocular Movements: Extraocular movements intact.      Conjunctiva/sclera: Conjunctivae normal.      Pupils: Pupils are equal, round, and reactive to light.   Neck:      Trachea: Trachea and phonation normal.   Cardiovascular:      Rate and Rhythm: Normal rate and regular rhythm.      Pulses: Normal pulses.           Radial pulses are 2+ on the right side and 2+ on the left side.      Heart sounds: Normal heart sounds. No murmur heard.  Pulmonary:      Effort: Pulmonary effort is normal.      Breath sounds: Normal breath sounds and air entry. No wheezing or rhonchi.   Abdominal:      Palpations: Abdomen is soft.   Musculoskeletal:         General: Normal range of motion.      Cervical back: Normal range of motion and neck supple. No rigidity.      Right lower leg: No edema.      Left lower leg: No edema.   Lymphadenopathy:      Cervical: No cervical adenopathy.   Skin:     General: Skin is warm and dry.      Capillary Refill: Capillary refill takes less than 2 seconds.   Neurological:      General: No focal deficit present.      Mental Status: She is alert and oriented to person, place, and time. Mental status is at baseline.      GCS: GCS eye subscore is 4. GCS verbal subscore is 5. GCS motor subscore is 6.      Cranial Nerves: No  cranial nerve deficit.      Sensory: No sensory deficit.      Motor: No weakness.      Coordination: Coordination normal.      Gait: Gait normal.   Psychiatric:         Attention and Perception: Attention and perception normal.         Mood and Affect: Mood normal. Affect is tearful.         Speech: Speech normal.         Behavior: Behavior normal. Behavior is cooperative.         Thought Content: Thought content normal.         Cognition and Memory: Cognition and memory normal.         Judgment: Judgment normal.          ASSESSMENT/PLAN:  1. Primary hypertension  Assessment & Plan:  nitial blood pressure 146/106 repeated prior to discharge 159/97.  Discussed with patient will increase hydralazine to 25 mg p.o. b.i.d..  Will re consolidate losartan to 1 tablet of 100 mg p.o. once daily instead of 50 mg twice a day.  Patient to continue with same dose of blood pressure medication Norvasc 10 mg daily and Coreg.  Instructed patient to keep her follow-up appointment in 2 weeks with Cardiology.  Continue to monitor blood pressure at home and notify the clinic if there is no improvement.  Questions solicited and answered, patient verbalized.  Patient read discharge education material.    Orders:  -     hydrALAZINE (APRESOLINE) 25 MG tablet; Take 1 tablet (25 mg total) by mouth 2 (two) times a day.  Dispense: 60 tablet; Refill: 11  -     losartan (COZAAR) 100 MG tablet; Take 1 tablet (100 mg total) by mouth once daily.  Dispense: 90 tablet; Refill: 3    2. Anxiety and depression  Assessment & Plan:  Depression and anxiety:  PHQ -9 score is 8, yuan-7 score is 14.  Patient is tearful during interview.  Patient was preserved took about was going on.  Patient agreed to be referred for counseling with behavioral health nurse practitioner bryan Contreras.  Also would like to start medications, Rx Celexa 20 mg p.o. once daily.  Discussed medication side effect.  Patient to return in 4 weeks or sooner for follow-up.  Patient  verbalized.    Regarding insomnia patient feels hydroxyzine 25 mg was not effective.  Discussed trazodone 25 mg p.o. at bedtime.  Instructed patient to dedicate at least total of 8 hours of sleep to prevent drowsiness and grogginess in the morning.  Discussed with patient if 25 mg dose is not enough she can take 50 mg of trazodone as needed for insomnia.  Patient to keep the clinic up-to-date medication adjustment.  Questions solicited and answered, patient verbalized and agreed to plan of care.  Patient read discharge education material.    Orders:  -     citalopram (CELEXA) 20 MG tablet; Take 1 tablet (20 mg total) by mouth once daily.  Dispense: 30 tablet; Refill: 1  -     traZODone (DESYREL) 50 MG tablet; Take 0.5 tablets (25 mg total) by mouth every evening.  Dispense: 15 tablet; Refill: 1    3. Psychophysiological insomnia  Assessment & Plan:  Regarding insomnia patient feels hydroxyzine 25 mg was not effective.  Discussed trazodone 25 mg p.o. at bedtime.  Instructed patient to dedicate at least total of 8 hours of sleep to prevent drowsiness and grogginess in the morning.  Discussed with patient if 25 mg dose is not enough she can take 50 mg of trazodone as needed for insomnia.  Patient to keep the clinic up-to-date medication adjustment.  Questions solicited and answered, patient verbalized and agreed to plan of care.  Patient read discharge education material.    Orders:  -     traZODone (DESYREL) 50 MG tablet; Take 0.5 tablets (25 mg total) by mouth every evening.  Dispense: 15 tablet; Refill: 1    4. Positive depression screening  Comments:  I have reviewed the positive depression score which warrants active treatment with psychotherapy and/or medications.  Overview:  I have reviewed the positive depression score which warrants active treatment with psychotherapy and/or medications.    Orders:  -     Ambulatory referral/consult to Behavioral Health; Future; Expected date: 11/02/2023  -     citalopram  (CELEXA) 20 MG tablet; Take 1 tablet (20 mg total) by mouth once daily.  Dispense: 30 tablet; Refill: 1           RESULTS:  Recent Results (from the past 1008 hour(s))   Liquid-Based Pap Smear, Screening Screening    Collection Time: 10/09/23  8:51 AM   Result Value Ref Range    Pathology Result     Comprehensive Metabolic Panel    Collection Time: 10/26/23  2:02 PM   Result Value Ref Range    Sodium Level 141 136 - 145 mmol/L    Potassium Level 4.6 3.5 - 5.1 mmol/L    Chloride 108 (H) 98 - 107 mmol/L    Carbon Dioxide 25 22 - 29 mmol/L    Glucose Level 110 (H) 74 - 100 mg/dL    Blood Urea Nitrogen 33.9 (H) 9.8 - 20.1 mg/dL    Creatinine 2.10 (H) 0.55 - 1.02 mg/dL    Calcium Level Total 9.5 8.4 - 10.2 mg/dL    Protein Total 7.3 6.4 - 8.3 gm/dL    Albumin Level 4.0 3.5 - 5.0 g/dL    Globulin 3.3 2.4 - 3.5 gm/dL    Albumin/Globulin Ratio 1.2 1.1 - 2.0 ratio    Bilirubin Total 0.6 <=1.5 mg/dL    Alkaline Phosphatase 117 40 - 150 unit/L    Alanine Aminotransferase 13 0 - 55 unit/L    Aspartate Aminotransferase 15 5 - 34 unit/L    eGFR 28 mls/min/1.73/m2         Follow Up:  Follow up in about 1 month (around 11/27/2023).     Face to face time 35 minutes, including documentation, chart review, counseling, education, review of test results, relevant medical records, and coordination of care.   I have explained the treatment plan, diagnosis, and prognosis to patient. All questions are answered to the best of my knowledge     Previous medical history/lab work/radiology reviewed and considered during medical management decisions.   Medication list reviewed and medication reconciliation performed.  Patient was provided  and care about his/her current diagnosis (es) and medications including risk/benefit and side effects/adverse events, over the counter medication uses/doses, home self-care and contact precautions,  and red flags and indications for when to seek immediate medical attention.   Patient was advised to  continue compliance with current medication list and medical recommendations.  Patient dvised continued compliance with recommended eating habits/ diets for medical conditions and exercise 150 minutes/ week (if possible) for medical condition (s).  Educational handouts and instructions on selected disease management in AVS (After Visit Summary).    All of the patient's questions were answered to patient's satisfaction.   The patient was receptive, expressed verbal understanding and agreement the above plan.     This note was created with the assistance of a voice recognition software or phone dictation. There may be transcription errors as a result of using this technology however minimal. Effort has been made to assure accuracy of transcription but any obvious errors or omissions should be clarified with the author of the document

## 2023-10-26 NOTE — ASSESSMENT & PLAN NOTE
Regarding insomnia patient feels hydroxyzine 25 mg was not effective.  Discussed trazodone 25 mg p.o. at bedtime.  Instructed patient to dedicate at least total of 8 hours of sleep to prevent drowsiness and grogginess in the morning.  Discussed with patient if 25 mg dose is not enough she can take 50 mg of trazodone as needed for insomnia.  Patient to keep the clinic up-to-date medication adjustment.  Questions solicited and answered, patient verbalized and agreed to plan of care.  Patient read discharge education material.

## 2023-10-26 NOTE — ASSESSMENT & PLAN NOTE
nitial blood pressure 146/106 repeated prior to discharge 159/97.  Discussed with patient will increase hydralazine to 25 mg p.o. b.i.d..  Will re consolidate losartan to 1 tablet of 100 mg p.o. once daily instead of 50 mg twice a day.  Patient to continue with same dose of blood pressure medication Norvasc 10 mg daily and Coreg.  Instructed patient to keep her follow-up appointment in 2 weeks with Cardiology.  Continue to monitor blood pressure at home and notify the clinic if there is no improvement.  Questions solicited and answered, patient verbalized.  Patient read discharge education material.

## 2023-10-26 NOTE — ASSESSMENT & PLAN NOTE
Depression and anxiety:  PHQ -9 score is 8, yuan-7 score is 14.  Patient is tearful during interview.  Patient was preserved took about was going on.  Patient agreed to be referred for counseling with behavioral health nurse practitioner bryan Contreras.  Also would like to start medications, Rx Celexa 20 mg p.o. once daily.  Discussed medication side effect.  Patient to return in 4 weeks or sooner for follow-up.  Patient verbalized.    Regarding insomnia patient feels hydroxyzine 25 mg was not effective.  Discussed trazodone 25 mg p.o. at bedtime.  Instructed patient to dedicate at least total of 8 hours of sleep to prevent drowsiness and grogginess in the morning.  Discussed with patient if 25 mg dose is not enough she can take 50 mg of trazodone as needed for insomnia.  Patient to keep the clinic up-to-date medication adjustment.  Questions solicited and answered, patient verbalized and agreed to plan of care.  Patient read discharge education material.

## 2023-11-01 ENCOUNTER — OFFICE VISIT (OUTPATIENT)
Dept: NEPHROLOGY | Facility: CLINIC | Age: 53
End: 2023-11-01
Payer: COMMERCIAL

## 2023-11-01 VITALS
SYSTOLIC BLOOD PRESSURE: 150 MMHG | BODY MASS INDEX: 32.53 KG/M2 | HEART RATE: 65 BPM | TEMPERATURE: 97 F | RESPIRATION RATE: 20 BRPM | HEIGHT: 69 IN | WEIGHT: 219.63 LBS | OXYGEN SATURATION: 99 % | DIASTOLIC BLOOD PRESSURE: 92 MMHG

## 2023-11-01 DIAGNOSIS — N18.4 CKD STAGE G4/A3, GFR 15-29 AND ALBUMIN CREATININE RATIO >300 MG/G: Primary | ICD-10-CM

## 2023-11-01 DIAGNOSIS — I10 PRIMARY HYPERTENSION: ICD-10-CM

## 2023-11-01 PROCEDURE — 4010F PR ACE/ARB THEARPY RXD/TAKEN: ICD-10-PCS | Mod: CPTII,,, | Performed by: NURSE PRACTITIONER

## 2023-11-01 PROCEDURE — 3008F PR BODY MASS INDEX (BMI) DOCUMENTED: ICD-10-PCS | Mod: CPTII,,, | Performed by: NURSE PRACTITIONER

## 2023-11-01 PROCEDURE — 3080F DIAST BP >= 90 MM HG: CPT | Mod: CPTII,,, | Performed by: NURSE PRACTITIONER

## 2023-11-01 PROCEDURE — 3077F PR MOST RECENT SYSTOLIC BLOOD PRESSURE >= 140 MM HG: ICD-10-PCS | Mod: CPTII,,, | Performed by: NURSE PRACTITIONER

## 2023-11-01 PROCEDURE — 99214 OFFICE O/P EST MOD 30 MIN: CPT | Mod: S$PBB,,, | Performed by: NURSE PRACTITIONER

## 2023-11-01 PROCEDURE — 99214 PR OFFICE/OUTPT VISIT, EST, LEVL IV, 30-39 MIN: ICD-10-PCS | Mod: S$PBB,,, | Performed by: NURSE PRACTITIONER

## 2023-11-01 PROCEDURE — 99215 OFFICE O/P EST HI 40 MIN: CPT | Mod: PBBFAC | Performed by: NURSE PRACTITIONER

## 2023-11-01 PROCEDURE — 1159F MED LIST DOCD IN RCRD: CPT | Mod: CPTII,,, | Performed by: NURSE PRACTITIONER

## 2023-11-01 PROCEDURE — 1159F PR MEDICATION LIST DOCUMENTED IN MEDICAL RECORD: ICD-10-PCS | Mod: CPTII,,, | Performed by: NURSE PRACTITIONER

## 2023-11-01 PROCEDURE — 4010F ACE/ARB THERAPY RXD/TAKEN: CPT | Mod: CPTII,,, | Performed by: NURSE PRACTITIONER

## 2023-11-01 PROCEDURE — 3066F NEPHROPATHY DOC TX: CPT | Mod: CPTII,,, | Performed by: NURSE PRACTITIONER

## 2023-11-01 PROCEDURE — 3008F BODY MASS INDEX DOCD: CPT | Mod: CPTII,,, | Performed by: NURSE PRACTITIONER

## 2023-11-01 PROCEDURE — 3077F SYST BP >= 140 MM HG: CPT | Mod: CPTII,,, | Performed by: NURSE PRACTITIONER

## 2023-11-01 PROCEDURE — 3066F PR DOCUMENTATION OF TREATMENT FOR NEPHROPATHY: ICD-10-PCS | Mod: CPTII,,, | Performed by: NURSE PRACTITIONER

## 2023-11-01 PROCEDURE — 3080F PR MOST RECENT DIASTOLIC BLOOD PRESSURE >= 90 MM HG: ICD-10-PCS | Mod: CPTII,,, | Performed by: NURSE PRACTITIONER

## 2023-11-01 RX ORDER — HYDRALAZINE HYDROCHLORIDE 50 MG/1
50 TABLET, FILM COATED ORAL 2 TIMES DAILY
Qty: 60 TABLET | Refills: 11 | Status: SHIPPED | OUTPATIENT
Start: 2023-11-01 | End: 2024-10-31

## 2023-11-01 NOTE — PROGRESS NOTES
"[facility]  Nephrology Clinic Note    Chief complaint: Chronic Kidney Disease (Follow up)    History of present illness:   Fe James is a 53 y.o. Black or  female with past medical history of chronic kidney disease, hypertension, hyperuricemia, and obesity. Presents for follow-up appointment, denies complaints.    Review of Systems  12 point review of systems conducted, negative except as stated in the history of present illness.    Allergies: Patient has No Known Allergies.     Past Medical History:  has a past medical history of CKD (chronic kidney disease) stage 3, GFR 30-59 ml/min, Gout, unspecified, HTN (hypertension), and Murmur, cardiac.    Procedure History:  has a past surgical history that includes Bilateral tubal ligation; Tonsillectomy; and Tubal ligation.    Family History: family history includes Aneurysm in her sister; Down syndrome in her brother; Hypertension in her sister; Kidney disease in her brother and mother; No Known Problems in her brother; Stroke in her father and mother.    Social History:  reports that she has never smoked. She has never used smokeless tobacco. She reports that she does not currently use alcohol. She reports that she does not use drugs.    Physical exam  BP (!) 150/92 (BP Location: Right arm, Patient Position: Sitting, BP Method: Large (Manual))   Pulse 65   Temp 97.4 °F (36.3 °C) (Oral)   Resp 20   Ht 5' 9" (1.753 m)   Wt 99.6 kg (219 lb 9.6 oz)   SpO2 99%   BMI 32.43 kg/m²   General appearance: Patient is in no acute distress.  Skin: No rashes or wounds.  HEENT: PERRLA, EOMI, no scleral icterus, no JVD. Neck is supple.  Chest: Respirations are unlabored. Lungs sounds are clear.   Heart: S1, S2.   Abdomen: Benign.  : Deferred.  Extremities: No edema, peripheral pulses are palpable.   Neuro: No focal deficits.     Home Medications:    Current Outpatient Medications:     allopurinoL (ZYLOPRIM) 100 MG tablet, Take 1 " tablet (100 mg total) by mouth once daily., Disp: 90 tablet, Rfl: 3    amLODIPine (NORVASC) 10 MG tablet, Take 1 tablet (10 mg total) by mouth once daily., Disp: 90 tablet, Rfl: 3    citalopram (CELEXA) 20 MG tablet, Take 1 tablet (20 mg total) by mouth once daily., Disp: 30 tablet, Rfl: 1    cloNIDine (CATAPRES) 0.1 MG tablet, Take 1 tablet (0.1 mg total) by mouth daily as needed (elevated blood pressure). If BP SYSTOLOIC 170 AND DYASTOLIC 100., Disp: 90 tablet, Rfl: 0    losartan (COZAAR) 100 MG tablet, Take 1 tablet (100 mg total) by mouth once daily., Disp: 90 tablet, Rfl: 3    nitroGLYCERIN (NITROSTAT) 0.4 MG SL tablet, Place 1 tablet (0.4 mg total) under the tongue every 5 (five) minutes as needed., Disp: 25 tablet, Rfl: 3    traZODone (DESYREL) 50 MG tablet, Take 0.5 tablets (25 mg total) by mouth every evening., Disp: 15 tablet, Rfl: 1    hydrALAZINE (APRESOLINE) 50 MG tablet, Take 1 tablet (50 mg total) by mouth 2 (two) times a day., Disp: 60 tablet, Rfl: 11    Laboratory data    Serum  Lab Results   Component Value Date    WBC 4.52 05/01/2023    HGB 14.3 05/01/2023    HCT 44.5 05/01/2023     05/01/2023     10/26/2023    K 4.6 10/26/2023    CHLORIDE 108 (H) 10/26/2023    CO2 25 10/26/2023    BUN 33.9 (H) 10/26/2023    CREATININE 2.10 (H) 10/26/2023    EGFRNORACEVR 28 10/26/2023    GLUCOSE 110 (H) 10/26/2023    CALCIUM 9.5 10/26/2023    ALKPHOS 117 10/26/2023    LABPROT 7.3 10/26/2023    ALBUMIN 4.0 10/26/2023    BILIDIR 0.1 06/16/2021    IBILI 0.20 06/16/2021    AST 15 10/26/2023    ALT 13 10/26/2023    MG 2.35 12/28/2020    PHOS 2.9 12/28/2020      Lab Results   Component Value Date    HGBA1C 5.2 10/05/2022    .1 (H) 12/28/2020    KAEUPHAW22EX 54.1 12/28/2020    ANAHS <1:80 (Negative) 09/05/2023    CANCA Negative 09/05/2023    PANCA Negative 09/05/2023    HIV Nonreactive 09/05/2023    HEPCAB Nonreactive 09/05/2023    HEPBSURFAG Nonreactive 09/05/2023    MSPIKEPCT  09/05/2023       Comment:      None observed.     Urine:  Lab Results   Component Value Date    COLORUA Light-Yellow 10/26/2023    APPEARANCEUA Clear 10/26/2023    SGUA 1.011 10/26/2023    PHUA 5.5 10/26/2023    PROTEINUA 1+ (A) 10/26/2023    GLUCOSEUA Normal 10/26/2023    KETONESUA Negative 10/26/2023    BLOODUA Negative 10/26/2023    NITRITESUA Negative 10/26/2023    LEUKOCYTESUR Negative 10/26/2023    RBCUA 0-5 10/26/2023    WBCUA 0-5 10/26/2023    BACTERIA None Seen 10/26/2023    SQEPUA Trace (A) 10/26/2023    HYALINECASTS None Seen 10/26/2023    CREATRANDUR 96.2 10/26/2023    PROTEINURINE 72.2 10/26/2023    UPROTCREA 0.8 10/26/2023         Imaging  US Retroperitoneal Limited 03/03/2020  Grayscale and color Doppler images of the kidneys were obtained. The  right kidney measures 9 cm and the left kidney measures 9 cm in  length. There is heterogeneous renal parenchymal echogenicity with  loss of corticomedullary differentiation. There is no hydronephrosis.  There is a 2 cm cyst of the left kidney.    Impression  Findings suggestive of medical renal disease.  Electronically Signed By: Douglas Gonzalez MD  Date/Time Signed: 03/03/2020 13:17      Impression and plan     CKD stage G4/A3, GFR 15-29 and albumin creatinine ratio >300 mg/g  Proteinuria evaluation revealed negative OREN, negative ANCA, SPEP negative for M spike, negative hepatitis panel, negative HIV.  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 Patient was provided with information on how to schedule a counseling session with a genetic counselor, should they desire to do so.   I discussed the benefits and limitations of genetic testing, including the potential implications of test results on clinical management and the fact that the clinical use and understanding of some of these genes included on the multi-gene panel analysis is limited.   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  There are no indications for immunosuppression.  Continue risk factor management, ILAN blockade, and treatment of complications advanced CKD.    Follow up in about 11 weeks (around 1/17/2024).     Primary hypertension  Blood pressure reading is at goal, continue current antihypertensive regimen and 2 g a day dietary sodium restriction.      BMI 32.0-32.9,adult  Lifestyle and dietary interventions discussed, patient encouraged to maintain non-sedentary lifestyle and well-balanced diet.     Orders Placed This Encounter   Procedures    Comprehensive Metabolic Panel     Standing Status:   Future     Standing Expiration Date:   2/10/2024    Protein/Creatinine Ratio, Urine     Standing Status:   Future     Standing Expiration Date:   2/10/2024     Order Specific Question:   Specimen Source     Answer:   Urine    Urinalysis, Reflex to Urine Culture     Standing Status:   Future     Standing Expiration Date:   2/10/2024     Order Specific Question:   Specimen Source     Answer:   Urine    PTH, Intact     Standing Status:   Future     Standing Expiration Date:   2/10/2024    Phosphorus     Standing Status:   Future     Standing Expiration Date:   2/10/2024        11/1/2023  Jazmin Huggins NP  [facility]

## 2023-11-01 NOTE — LETTER
November 1, 2023      [facility]  [address] [address]  Phone: [phone]       Patient: Fe James   YOB: 1970  Date of Visit: 11/01/2023    To Whom It May Concern:    Emiliano James  was at [facility] on 11/01/2023. The patient may return to work/school on 11/01/2023 with no restrictions. If you have any questions or concerns, or if I can be of further assistance, please do not hesitate to contact me.    Sincerely,    [facility]

## 2023-11-07 NOTE — PROGRESS NOTES
CHIEF COMPLAINT:   Chief Complaint   Patient presents with    3 month f/u visit      C/o sob attributed to weight gain                                                  HPI:  Fe James 53 y.o. female with HTN and CKD who presents to cardiology clinic for routine follow up and ongoing care.  Due to previous complaints of exertional dyspnea and intermittent chest pain, the patient completed a nuclear stress test on 23  that was equivocal due to LBBB, with a mild-to-moderate intensity, small to moderate-sized, equivocal mostly fixed perfusion abnormality with some reversibility in the basal to mid septal wall that is being medically managed. Echocardiogram obtained on 23 revealed preserved EF of 65%, normal diastolic function and no significant valvular abnormalities.  Seven day event monitor obtained in 2023 revealed underlying normal sinus rhythm with no significant arrhythmias noted.      Patient presents to clinic today reporting that she feels well overall.  She endorses improvement in her exertional dyspnea since last seen.  She denies any chest pain, orthopnea, PND, peripheral edema, claudication symptoms, lightheadedness, dizziness or syncope.  She also notes significant improvement in palpitations with the addition of carvedilol.  She states that she experienced 2 transient episodes of palpitations since initiating the medication.  Patient reports that she has also modified her diet and has increased her physical activity by ambulating 3 to 4 times a week and is able to do so without experiencing any ischemic symptoms.  She endorses compliance with her current medications and is tolerating without issue.    Social History:  Denies any Illicit drug, ETOH or tobacco use  Family History: Mom-  of massive CVA at age 56-; Dad-  of CVA at age 72, Brother- Downs Syndrome-enlarged heart: sister-  of brain aneurysm at age 16                                                                                                                                                                                                                                                                                                  CARDIAC TESTING:   Nuclear Stress Test 7.27.23    Equivocal myocardial perfusion scan.    There is a mild to moderate intensity, small to moderate sized, equivocal mostly fixed perfusion abnormality with some reversibilty in the basal to mid septal wall(s). This finding is equivocal due to LBBB.    There are no other significant perfusion abnormalities.    The gated perfusion images showed an ejection fraction of 51% at rest. The gated perfusion images showed an ejection fraction of 67% post stress. Normal ejection fraction is greater than 53%.    The patient reported no chest pain during the stress test.       ECHO 7.27.23  Concentric hypertrophy and normal systolic function.  The estimated ejection fraction is 65%.  Normal left ventricular diastolic function.  Normal right ventricular size.  Normal central venous pressure (3 mmHg).  The estimated PA systolic pressure is 16 mmHg.    7 day Event Monitor 7.3.23-7.9.23  [place] present included: <1% Bradycardia, <1 Tachycardia (1 episode), <1% Supraventricular (2 episodes), 2.24% Ventricular (9 episodes, 94.66% NSR (3 episodes), 2.87% Other [place]    Physician Interpretation  Underlying rhythm is sinus.  During symptoms of chest discomfort the patient is in sinus with HR 92-94.  During manually marked events (but no symptom reported) the patient is in sinus with HR .  No significant arrhythmias noted         Patient Active Problem List   Diagnosis    Primary hypertension    Elevated uric acid in blood    Heart murmur    Stage 3 chronic kidney disease    Encounter for colorectal cancer screening    Murmur    SOB (shortness of breath) on exertion    Obesity    Plantar fasciitis    Sciatica    Psychophysiological insomnia    Impacted cerumen of  left ear    Anxiety and depression    Positive depression screening    Abnormal stress test     Past Surgical History:   Procedure Laterality Date    BILATERAL TUBAL LIGATION      TONSILLECTOMY      TUBAL LIGATION       Social History     Socioeconomic History    Marital status:     Highest education level: Some college, no degree   Occupational History    Occupation: [employer] manager at [employer]   Tobacco Use    Smoking status: Never    Smokeless tobacco: Never   Substance and Sexual Activity    Alcohol use: Not Currently    Drug use: Never    Sexual activity: Not Currently     Partners: Male   Social History Narrative    ** Merged History Encounter **          Social Determinants of Health     Financial Resource Strain: Low Risk  (10/5/2022)    Overall Financial Resource Strain (CARDIA)     Difficulty of Paying Living Expenses: Not hard at all   Food Insecurity: No Food Insecurity (10/5/2022)    Hunger Vital Sign     Worried About Running Out of Food in the Last Year: Never true     Ran Out of Food in the Last Year: Never true   Transportation Needs: No Transportation Needs (10/5/2022)    PRAPARE - Transportation     Lack of Transportation (Medical): No     Lack of Transportation (Non-Medical): No   Physical Activity: Inactive (10/5/2022)    Exercise Vital Sign     Days of Exercise per Week: 0 days     Minutes of Exercise per Session: 0 min   Stress: No Stress Concern Present (10/5/2022)    [employer] - Occupational Stress Questionnaire     Feeling of Stress : Not at all   Social Connections: Moderately Integrated (10/5/2022)    Social Connection and Isolation Panel [NHANES]     Frequency of Communication with Friends and Family: Twice a week     Frequency of Social Gatherings with Friends and Family: Once a week     Attends Hindu Services: More than 4 times per year     Active Member of Clubs or Organizations: Yes     Attends Club or Organization Meetings: Never      "Marital Status:    Housing Stability: Low Risk  (10/5/2022)    Housing Stability Vital Sign     Unable to Pay for Housing in the Last Year: No     Number of Places Lived in the Last Year: 1     Unstable Housing in the Last Year: No        Family History   Problem Relation Age of Onset    Kidney disease Mother     Stroke Mother     Stroke Father     Aneurysm Sister     Hypertension Sister     Kidney disease Brother     Down syndrome Brother     No Known Problems Brother      Review of patient's allergies indicates:  No Known Allergies      ROS:  Review of Systems   Constitutional: Negative.    HENT: Negative.     Eyes: Negative.    Respiratory:  Positive for shortness of breath.    Cardiovascular:  Positive for chest pain, palpitations and leg swelling.   Gastrointestinal: Negative.    Genitourinary: Negative.    Musculoskeletal: Negative.    Skin: Negative.    Neurological: Negative.    Endo/Heme/Allergies: Negative.    Psychiatric/Behavioral: Negative.                                                                                                                                                                                  Negative except as stated in the history of present illness. See HPI for details.    PHYSICAL EXAM:  Visit Vitals  BP (!) 150/86 (BP Location: Left arm, Patient Position: Sitting, BP Method: Large (Manual))   Pulse 93   Temp 98.1 °F (36.7 °C) (Oral)   Resp 18   Ht 5' 9" (1.753 m)   Wt 95.7 kg (211 lb)   SpO2 99%   BMI 31.16 kg/m²           Physical Exam  HENT:      Head: Normocephalic.      Nose: Nose normal.   Eyes:      Pupils: Pupils are equal, round, and reactive to light.   Cardiovascular:      Rate and Rhythm: Normal rate and regular rhythm.      Heart sounds: Murmur heard.   Pulmonary:      Effort: Pulmonary effort is normal.   Abdominal:      General: Abdomen is flat. Bowel sounds are normal.      Palpations: Abdomen is soft.   Musculoskeletal:         General: Normal range of " motion.      Cervical back: Normal range of motion.   Skin:     General: Skin is warm.   Neurological:      General: No focal deficit present.      Mental Status: She is alert.   Psychiatric:         Mood and Affect: Mood normal.       Current Outpatient Medications   Medication Instructions    allopurinoL (ZYLOPRIM) 100 mg, Oral, Daily    amLODIPine (NORVASC) 10 mg, Oral, Daily    carvediloL (COREG) 3.125 mg, Oral, 2 times daily with meals    citalopram (CELEXA) 20 mg, Oral, Daily    cloNIDine (CATAPRES) 0.1 mg, Oral, Daily PRN, If BP SYSTOLOIC 170 AND DYASTOLIC 100.    hydrALAZINE (APRESOLINE) 50 mg, Oral, 2 times daily    losartan (COZAAR) 100 mg, Oral, Daily    nitroGLYCERIN (NITROSTAT) 0.4 mg, Sublingual, Every 5 min PRN    traZODone (DESYREL) 25 mg, Oral, Nightly        All medications, laboratory studies, cardiac diagnostic imaging reviewed.     Lab Results   Component Value Date    LDL 97.00 10/05/2022    LDL 93.00 06/16/2021    TRIG 117 10/05/2022    TRIG 111 06/16/2021    CREATININE 2.10 (H) 10/26/2023    MG 2.35 12/28/2020    K 4.6 10/26/2023        ASSESSMENT/PLAN:  Equivocal stress test  - mild to moderate intensity, small to moderate sized, equivocal mostly fixed perfusion abnormality with some reversibilty in the basal to mid septal wall(s). This finding is equivocal due to LBBB.(7.27.23)  - Reports improved SOB. Denies CP  - Continue medication management with amlodipine 10 mg, coreg 3.125 mg BID and SL nitro prn CP  - Instructed patient to notify clinic of any anginal/anginal equivalent symptoms.  If symptoms persist despite optimization of medications, consider further invasive evaluation at that time.  - Strict ED precautions provided     SOB with exertion- improved   - LVEF -65%, normal DD, and no significant valvular abnormalities per ECHO 7.27.23  - Consider PFTs, Defer to PCP     Palpitations -improved   - 7 day event monitor- underlying normal sinus rhythm with no significant arrhythmias  noted (July 2023)  - Continue Coreg 3.125 mg BID  - Advised to avoid/decrease caffeine intake and remain well hydrated        HTN  - BP above goal, but reports systolic blood pressure ranging in the 120s and 130s at home  - Continue current medications for now.  Coreg 3.125 mg BID, losartan 100 mg,  amlodipine 10 mg. Hydralazine recently increased to 50 mg BID per nephrology   - Has prn clonidine per PCP.   - NV in 2-3 weeks for BP/HR check.  Instructed the patient to bring blood pressure monitor to ensure adequacy.  If BP remains above goal can further increase hydralazine or up titrate beta-blocker pending HR  - Continue management per PCP     CKD  - Management per nephrology     Nurse visit in 2-3 weeks for BP/HR check.  Please bring BP log and BP monitor.  Follow-up in cardiology clinic in 6 months or sooner if needed  Follow-up with PCP/nephrology as directed  ED precautions

## 2023-11-10 ENCOUNTER — OFFICE VISIT (OUTPATIENT)
Dept: CARDIOLOGY | Facility: CLINIC | Age: 53
End: 2023-11-10
Payer: COMMERCIAL

## 2023-11-10 VITALS
DIASTOLIC BLOOD PRESSURE: 86 MMHG | OXYGEN SATURATION: 99 % | RESPIRATION RATE: 18 BRPM | WEIGHT: 211 LBS | TEMPERATURE: 98 F | BODY MASS INDEX: 31.25 KG/M2 | HEART RATE: 93 BPM | HEIGHT: 69 IN | SYSTOLIC BLOOD PRESSURE: 150 MMHG

## 2023-11-10 DIAGNOSIS — I10 PRIMARY HYPERTENSION: Primary | ICD-10-CM

## 2023-11-10 DIAGNOSIS — R06.02 SOB (SHORTNESS OF BREATH) ON EXERTION: ICD-10-CM

## 2023-11-10 DIAGNOSIS — R94.39 ABNORMAL STRESS TEST: ICD-10-CM

## 2023-11-10 PROCEDURE — 3066F PR DOCUMENTATION OF TREATMENT FOR NEPHROPATHY: ICD-10-PCS | Mod: CPTII,,, | Performed by: NURSE PRACTITIONER

## 2023-11-10 PROCEDURE — 3008F BODY MASS INDEX DOCD: CPT | Mod: CPTII,,, | Performed by: NURSE PRACTITIONER

## 2023-11-10 PROCEDURE — 1159F PR MEDICATION LIST DOCUMENTED IN MEDICAL RECORD: ICD-10-PCS | Mod: CPTII,,, | Performed by: NURSE PRACTITIONER

## 2023-11-10 PROCEDURE — 1159F MED LIST DOCD IN RCRD: CPT | Mod: CPTII,,, | Performed by: NURSE PRACTITIONER

## 2023-11-10 PROCEDURE — 99214 OFFICE O/P EST MOD 30 MIN: CPT | Mod: PBBFAC | Performed by: NURSE PRACTITIONER

## 2023-11-10 PROCEDURE — 99214 OFFICE O/P EST MOD 30 MIN: CPT | Mod: S$PBB,,, | Performed by: NURSE PRACTITIONER

## 2023-11-10 PROCEDURE — 1160F RVW MEDS BY RX/DR IN RCRD: CPT | Mod: CPTII,,, | Performed by: NURSE PRACTITIONER

## 2023-11-10 PROCEDURE — 1160F PR REVIEW ALL MEDS BY PRESCRIBER/CLIN PHARMACIST DOCUMENTED: ICD-10-PCS | Mod: CPTII,,, | Performed by: NURSE PRACTITIONER

## 2023-11-10 PROCEDURE — 3079F PR MOST RECENT DIASTOLIC BLOOD PRESSURE 80-89 MM HG: ICD-10-PCS | Mod: CPTII,,, | Performed by: NURSE PRACTITIONER

## 2023-11-10 PROCEDURE — 3008F PR BODY MASS INDEX (BMI) DOCUMENTED: ICD-10-PCS | Mod: CPTII,,, | Performed by: NURSE PRACTITIONER

## 2023-11-10 PROCEDURE — 99214 PR OFFICE/OUTPT VISIT, EST, LEVL IV, 30-39 MIN: ICD-10-PCS | Mod: S$PBB,,, | Performed by: NURSE PRACTITIONER

## 2023-11-10 PROCEDURE — 4010F ACE/ARB THERAPY RXD/TAKEN: CPT | Mod: CPTII,,, | Performed by: NURSE PRACTITIONER

## 2023-11-10 PROCEDURE — 3077F SYST BP >= 140 MM HG: CPT | Mod: CPTII,,, | Performed by: NURSE PRACTITIONER

## 2023-11-10 PROCEDURE — 3066F NEPHROPATHY DOC TX: CPT | Mod: CPTII,,, | Performed by: NURSE PRACTITIONER

## 2023-11-10 PROCEDURE — 3077F PR MOST RECENT SYSTOLIC BLOOD PRESSURE >= 140 MM HG: ICD-10-PCS | Mod: CPTII,,, | Performed by: NURSE PRACTITIONER

## 2023-11-10 PROCEDURE — 3079F DIAST BP 80-89 MM HG: CPT | Mod: CPTII,,, | Performed by: NURSE PRACTITIONER

## 2023-11-10 PROCEDURE — 4010F PR ACE/ARB THEARPY RXD/TAKEN: ICD-10-PCS | Mod: CPTII,,, | Performed by: NURSE PRACTITIONER

## 2023-11-10 RX ORDER — CARVEDILOL 3.12 MG/1
3.12 TABLET ORAL 2 TIMES DAILY WITH MEALS
COMMUNITY
End: 2023-11-10 | Stop reason: SDUPTHER

## 2023-11-10 RX ORDER — CARVEDILOL 3.12 MG/1
3.12 TABLET ORAL 2 TIMES DAILY WITH MEALS
Qty: 180 TABLET | Refills: 3 | Status: SHIPPED | OUTPATIENT
Start: 2023-11-10 | End: 2024-11-09

## 2023-11-10 NOTE — PATIENT INSTRUCTIONS
Nurse visit in 2-3 weeks for BP/HR check.  Please bring BP log and BP monitor.  Follow-up in cardiology clinic in 6 months or sooner if needed  Follow-up with PCP/nephrology as directed  ED precautions

## 2023-11-15 LAB — NONINV COLON CA DNA+OCC BLD SCRN STL QL: NEGATIVE

## 2023-12-13 ENCOUNTER — TELEPHONE (OUTPATIENT)
Dept: FAMILY MEDICINE | Facility: CLINIC | Age: 53
End: 2023-12-13
Payer: COMMERCIAL

## 2023-12-13 VITALS — DIASTOLIC BLOOD PRESSURE: 68 MMHG | SYSTOLIC BLOOD PRESSURE: 125 MMHG

## 2023-12-13 DIAGNOSIS — I10 PRIMARY HYPERTENSION: Primary | ICD-10-CM

## 2023-12-13 NOTE — TELEPHONE ENCOUNTER
Spoke to patient over the phone, patient id self.  Patient state after the changing by increasing Cozaar to 100 mg p.o. once daily.  And hydralazine 50 mg twice a day blood pressure has been stable.  Patient to continue also take Norvasc 10 mg p.o. daily and Coreg 3.125 mg twice a day.  Questions solicited and answered, patient verbalized and agreed to plan.

## 2024-01-09 ENCOUNTER — TELEPHONE (OUTPATIENT)
Dept: FAMILY MEDICINE | Facility: CLINIC | Age: 54
End: 2024-01-09
Payer: COMMERCIAL

## 2024-01-09 DIAGNOSIS — F41.9 ANXIETY AND DEPRESSION: Primary | ICD-10-CM

## 2024-01-09 DIAGNOSIS — F32.A ANXIETY AND DEPRESSION: Primary | ICD-10-CM

## 2024-01-09 NOTE — TELEPHONE ENCOUNTER
Spoke to patient over the phone, patient id self with accurate date of birth.  Patient has sent a refill on Celexa 20 mg.  Reviewing chart patient has not taking her medication since November 2023.  Patient state when she went to  her medication from the pharmacy properly either has sent for refill.  Patient state she does not take it like she is supposed too.  She takes it whenever she feel likes she needs it.  When she takes it makes her feel down.  Discussed with patient to stop the medication.  Keep appointment on February 1, 2024.  Will discuss different medication management.  Questions solicited and answered, patient verbalized and agreed to plan.

## 2024-01-11 ENCOUNTER — LAB VISIT (OUTPATIENT)
Dept: LAB | Facility: HOSPITAL | Age: 54
End: 2024-01-11
Attending: NURSE PRACTITIONER
Payer: COMMERCIAL

## 2024-01-11 DIAGNOSIS — N18.4 CKD STAGE G4/A3, GFR 15-29 AND ALBUMIN CREATININE RATIO >300 MG/G: ICD-10-CM

## 2024-01-11 LAB
ALBUMIN SERPL-MCNC: 3.9 G/DL (ref 3.5–5)
ALBUMIN/GLOB SERPL: 1.3 RATIO (ref 1.1–2)
ALP SERPL-CCNC: 92 UNIT/L (ref 40–150)
ALT SERPL-CCNC: 12 UNIT/L (ref 0–55)
APPEARANCE UR: CLEAR
AST SERPL-CCNC: 13 UNIT/L (ref 5–34)
BACTERIA #/AREA URNS AUTO: ABNORMAL /HPF
BILIRUB SERPL-MCNC: 0.5 MG/DL
BILIRUB UR QL STRIP.AUTO: NEGATIVE
BUN SERPL-MCNC: 33.2 MG/DL (ref 9.8–20.1)
CALCIUM SERPL-MCNC: 8.7 MG/DL (ref 8.4–10.2)
CHLORIDE SERPL-SCNC: 111 MMOL/L (ref 98–107)
CO2 SERPL-SCNC: 23 MMOL/L (ref 22–29)
COLOR UR AUTO: COLORLESS
CREAT SERPL-MCNC: 2.01 MG/DL (ref 0.55–1.02)
CREAT UR-MCNC: 64.7 MG/DL (ref 45–106)
GFR SERPLBLD CREATININE-BSD FMLA CKD-EPI: 29 MLS/MIN/1.73/M2
GLOBULIN SER-MCNC: 2.9 GM/DL (ref 2.4–3.5)
GLUCOSE SERPL-MCNC: 150 MG/DL (ref 74–100)
GLUCOSE UR QL STRIP.AUTO: NORMAL
HYALINE CASTS #/AREA URNS LPF: ABNORMAL /LPF
KETONES UR QL STRIP.AUTO: NEGATIVE
LEUKOCYTE ESTERASE UR QL STRIP.AUTO: NEGATIVE
MUCOUS THREADS URNS QL MICRO: ABNORMAL /LPF
NITRITE UR QL STRIP.AUTO: NEGATIVE
PH UR STRIP.AUTO: 5 [PH]
PHOSPHATE SERPL-MCNC: 3 MG/DL (ref 2.3–4.7)
POTASSIUM SERPL-SCNC: 4 MMOL/L (ref 3.5–5.1)
PROT SERPL-MCNC: 6.8 GM/DL (ref 6.4–8.3)
PROT UR QL STRIP.AUTO: ABNORMAL
PROT UR STRIP-MCNC: 26.9 MG/DL
PTH-INTACT SERPL-MCNC: 212.3 PG/ML (ref 8.7–77)
RBC #/AREA URNS AUTO: ABNORMAL /HPF
RBC UR QL AUTO: NEGATIVE
SODIUM SERPL-SCNC: 140 MMOL/L (ref 136–145)
SP GR UR STRIP.AUTO: 1.01 (ref 1–1.03)
SQUAMOUS #/AREA URNS LPF: ABNORMAL /HPF
URINE PROTEIN/CREATININE RATIO (OLG): 0.4
UROBILINOGEN UR STRIP-ACNC: NORMAL
WBC #/AREA URNS AUTO: ABNORMAL /HPF

## 2024-01-11 PROCEDURE — 84100 ASSAY OF PHOSPHORUS: CPT

## 2024-01-11 PROCEDURE — 83970 ASSAY OF PARATHORMONE: CPT

## 2024-01-11 PROCEDURE — 36415 COLL VENOUS BLD VENIPUNCTURE: CPT

## 2024-01-11 PROCEDURE — 82570 ASSAY OF URINE CREATININE: CPT

## 2024-01-11 PROCEDURE — 80053 COMPREHEN METABOLIC PANEL: CPT

## 2024-01-11 PROCEDURE — 81001 URINALYSIS AUTO W/SCOPE: CPT

## 2024-01-17 ENCOUNTER — OFFICE VISIT (OUTPATIENT)
Dept: NEPHROLOGY | Facility: CLINIC | Age: 54
End: 2024-01-17
Payer: COMMERCIAL

## 2024-01-17 DIAGNOSIS — N25.81 SECONDARY HYPERPARATHYROIDISM OF RENAL ORIGIN: ICD-10-CM

## 2024-01-17 DIAGNOSIS — N18.4 CKD STAGE G4/A3, GFR 15-29 AND ALBUMIN CREATININE RATIO >300 MG/G: Primary | ICD-10-CM

## 2024-01-17 DIAGNOSIS — I10 PRIMARY HYPERTENSION: ICD-10-CM

## 2024-01-17 PROCEDURE — 3066F NEPHROPATHY DOC TX: CPT | Mod: CPTII,95,, | Performed by: NURSE PRACTITIONER

## 2024-01-17 PROCEDURE — 4010F ACE/ARB THERAPY RXD/TAKEN: CPT | Mod: CPTII,95,, | Performed by: NURSE PRACTITIONER

## 2024-01-17 PROCEDURE — 1159F MED LIST DOCD IN RCRD: CPT | Mod: CPTII,95,, | Performed by: NURSE PRACTITIONER

## 2024-01-17 PROCEDURE — 99214 OFFICE O/P EST MOD 30 MIN: CPT | Mod: 95,,, | Performed by: NURSE PRACTITIONER

## 2024-01-17 RX ORDER — CALCITRIOL 0.25 UG/1
0.25 CAPSULE ORAL EVERY OTHER DAY
Qty: 45 CAPSULE | Refills: 1 | Status: SHIPPED | OUTPATIENT
Start: 2024-01-17 | End: 2024-07-15

## 2024-01-17 NOTE — PROGRESS NOTES
Established Patient - Audio Only Telehealth Visit     The patient location is: Home  The chief complaint leading to consultation is:  Management of chronic kidney disease  Visit type: Virtual visit with audio only (telephone)  Total time spent with patient:  25  minutes     The reason for the audio only service rather than synchronous audio and video virtual visit was related to technical difficulties or patient preference/necessity.     Each patient to whom I provide medical services by telemedicine is:  (1) informed of the relationship between the physician and patient and the respective role of any other health care provider with respect to management of the patient; and (2) notified that they may decline to receive medical services by telemedicine and may withdraw from such care at any time. Patient verbally consented to receive this service via voice-only telephone call.     This service was not originating from a related E/M service provided within the previous 7 days nor will  to an E/M service or procedure within the next 24 hours or my soonest available appointment.  Prevailing standard of care was able to be met in this audio-only visit.      [facility]  Nephrology Clinic Note    Chief complaint: Follow-up (Virtual)    History of present illness:   Fe James is a 53 y.o. Black or  female with past medical history of chronic kidney disease, hypertension, hyperuricemia, and obesity. Presents for follow-up appointment, denies complaints.    Review of Systems  12 point review of systems conducted, negative except as stated in the history of present illness.    Allergies: Patient has No Known Allergies.     Past Medical History:  has a past medical history of CKD (chronic kidney disease) stage 3, GFR 30-59 ml/min, Gout, unspecified, HTN (hypertension), and Murmur, cardiac.    Procedure History:  has a past surgical history that includes Bilateral tubal  ligation; Tonsillectomy; and Tubal ligation.    Family History: family history includes Aneurysm in her sister; Down syndrome in her brother; Hypertension in her sister; Kidney disease in her brother and mother; No Known Problems in her brother; Stroke in her father and mother.    Social History:  reports that she has never smoked. She has never used smokeless tobacco. She reports that she does not currently use alcohol. She reports that she does not use drugs.    Home Medications:    Current Outpatient Medications:     allopurinoL (ZYLOPRIM) 100 MG tablet, Take 1 tablet (100 mg total) by mouth once daily., Disp: 90 tablet, Rfl: 3    amLODIPine (NORVASC) 10 MG tablet, Take 1 tablet (10 mg total) by mouth once daily., Disp: 90 tablet, Rfl: 3    calcitRIOL (ROCALTROL) 0.25 MCG Cap, Take 1 capsule (0.25 mcg total) by mouth every other day., Disp: 45 capsule, Rfl: 1    carvediloL (COREG) 3.125 MG tablet, Take 1 tablet (3.125 mg total) by mouth 2 (two) times daily with meals., Disp: 180 tablet, Rfl: 3    cloNIDine (CATAPRES) 0.1 MG tablet, Take 1 tablet (0.1 mg total) by mouth daily as needed (elevated blood pressure). If BP SYSTOLOIC 170 AND DYASTOLIC 100., Disp: 90 tablet, Rfl: 0    hydrALAZINE (APRESOLINE) 50 MG tablet, Take 1 tablet (50 mg total) by mouth 2 (two) times a day., Disp: 60 tablet, Rfl: 11    losartan (COZAAR) 100 MG tablet, Take 1 tablet (100 mg total) by mouth once daily., Disp: 90 tablet, Rfl: 3    metoprolol succinate (TOPROL-XL) 25 MG 24 hr tablet, Take 12.5 mg by mouth once daily., Disp: , Rfl:     nitroGLYCERIN (NITROSTAT) 0.4 MG SL tablet, Place 1 tablet (0.4 mg total) under the tongue every 5 (five) minutes as needed., Disp: 25 tablet, Rfl: 3    traZODone (DESYREL) 50 MG tablet, Take 0.5 tablets (25 mg total) by mouth every evening., Disp: 15 tablet, Rfl: 1    Laboratory data    Lab Results   Component Value Date    WBC 4.52 05/01/2023    HGB 14.3 05/01/2023    HCT 44.5 05/01/2023      05/01/2023     01/11/2024    K 4.0 01/11/2024    CHLORIDE 111 (H) 01/11/2024    CO2 23 01/11/2024    BUN 33.2 (H) 01/11/2024    CREATININE 2.01 (H) 01/11/2024    EGFRNORACEVR 29 01/11/2024    GLUCOSE 150 (H) 01/11/2024    CALCIUM 8.7 01/11/2024    ALKPHOS 92 01/11/2024    LABPROT 6.8 01/11/2024    ALBUMIN 3.9 01/11/2024    BILIDIR 0.1 06/16/2021    IBILI 0.20 06/16/2021    AST 13 01/11/2024    ALT 12 01/11/2024    MG 2.35 12/28/2020    PHOS 3.0 01/11/2024      Lab Results   Component Value Date    HGBA1C 5.2 10/05/2022    .3 (H) 01/11/2024    IDQGUWAC65RX 54.1 12/28/2020    ANAHS <1:80 (Negative) 09/05/2023    CANCA Negative 09/05/2023    PANCA Negative 09/05/2023    HIV Nonreactive 09/05/2023    HEPCAB Nonreactive 09/05/2023    HEPBSURFAG Nonreactive 09/05/2023    MSPIKEPCT  09/05/2023      Comment:      None observed.     Urine:  Lab Results   Component Value Date    COLORUA Colorless (A) 01/11/2024    APPEARANCEUA Clear 01/11/2024    SGUA 1.010 01/11/2024    PHUA 5.0 01/11/2024    PROTEINUA Trace (A) 01/11/2024    GLUCOSEUA Normal 01/11/2024    KETONESUA Negative 01/11/2024    BLOODUA Negative 01/11/2024    NITRITESUA Negative 01/11/2024    LEUKOCYTESUR Negative 01/11/2024    RBCUA 0-5 01/11/2024    WBCUA 0-5 01/11/2024    BACTERIA None Seen 01/11/2024    SQEPUA Few (A) 01/11/2024    HYALINECASTS None Seen 01/11/2024    CREATRANDUR 64.7 01/11/2024    PROTEINURINE 26.9 01/11/2024    UPROTCREA 0.4 01/11/2024         Imaging  US Retroperitoneal Limited 03/03/2020     Grayscale and color Doppler images of the kidneys were obtained. The  right kidney measures 9 cm and the left kidney measures 9 cm in  length. There is heterogeneous renal parenchymal echogenicity with  loss of corticomedullary differentiation. There is no hydronephrosis.  There is a 2 cm cyst of the left kidney.    Impression  Findings suggestive of medical renal disease.       Impression    ICD-10-CM ICD-9-CM   1. CKD stage G4/A3, GFR  15-29 and albumin creatinine ratio >300 mg/g  N18.4 585.4   2. Secondary hyperparathyroidism of renal origin  N25.81 588.81   3. Primary hypertension  I10 401.9   4. BMI 31.0-31.9,adult  Z68.31 V85.31        Plan     CKD stage G4/A3, GFR 15-29 and albumin creatinine ratio >300 mg/g  -     Comprehensive Metabolic Panel; Future; Expected date: 04/07/2024  -     Protein/Creatinine Ratio, Urine; Future; Expected date: 04/07/2024  Proteinuria evaluation revealed negative OREN, negative ANCA, SPEP negative for M spike, negative hepatitis panel, negative HIV.  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  There are no indications for immunosuppression.  Continue risk factor management, ILAN blockade, and treatment of complications advanced CKD.    Follow up in about 3 months (around 4/17/2024).    Secondary hyperparathyroidism of renal origin  -     calcitRIOL (ROCALTROL) 0.25 MCG Cap; Take 1 capsule (0.25 mcg total) by mouth every other day.  Dispense: 45 capsule; Refill: 1  -     PTH, Intact; Future; Expected date: 04/07/2024  -     Phosphorus; Future; Expected date: 04/07/2024  Intact PTH is trending up.  Will start calcitriol, repeat intact PTH and phosphorus levels prior to next visit.      Primary hypertension  Blood pressure reading is at goal, continue current antihypertensive regimen and 2 g a day dietary sodium restriction.      BMI 31.0-31.9,adult  Lifestyle and dietary interventions discussed, patient encouraged to maintain non-sedentary lifestyle and well-balanced diet.           1/17/2024  Jazmin Huggins NP  [facility]

## 2024-02-01 ENCOUNTER — OFFICE VISIT (OUTPATIENT)
Dept: FAMILY MEDICINE | Facility: CLINIC | Age: 54
End: 2024-02-01
Payer: COMMERCIAL

## 2024-02-01 VITALS
WEIGHT: 217 LBS | DIASTOLIC BLOOD PRESSURE: 82 MMHG | SYSTOLIC BLOOD PRESSURE: 138 MMHG | HEART RATE: 68 BPM | BODY MASS INDEX: 32.14 KG/M2 | HEIGHT: 69 IN | TEMPERATURE: 98 F | RESPIRATION RATE: 18 BRPM

## 2024-02-01 DIAGNOSIS — I10 PRIMARY HYPERTENSION: Primary | ICD-10-CM

## 2024-02-01 DIAGNOSIS — Z12.31 ENCOUNTER FOR SCREENING MAMMOGRAM FOR BREAST CANCER: ICD-10-CM

## 2024-02-01 PROCEDURE — 1160F RVW MEDS BY RX/DR IN RCRD: CPT | Mod: CPTII,,, | Performed by: NURSE PRACTITIONER

## 2024-02-01 PROCEDURE — 99213 OFFICE O/P EST LOW 20 MIN: CPT | Mod: S$PBB,,, | Performed by: NURSE PRACTITIONER

## 2024-02-01 PROCEDURE — 99215 OFFICE O/P EST HI 40 MIN: CPT | Mod: PBBFAC | Performed by: NURSE PRACTITIONER

## 2024-02-01 PROCEDURE — 3075F SYST BP GE 130 - 139MM HG: CPT | Mod: CPTII,,, | Performed by: NURSE PRACTITIONER

## 2024-02-01 PROCEDURE — 1159F MED LIST DOCD IN RCRD: CPT | Mod: CPTII,,, | Performed by: NURSE PRACTITIONER

## 2024-02-01 PROCEDURE — 3008F BODY MASS INDEX DOCD: CPT | Mod: CPTII,,, | Performed by: NURSE PRACTITIONER

## 2024-02-01 PROCEDURE — 3079F DIAST BP 80-89 MM HG: CPT | Mod: CPTII,,, | Performed by: NURSE PRACTITIONER

## 2024-02-01 PROCEDURE — 4010F ACE/ARB THERAPY RXD/TAKEN: CPT | Mod: CPTII,,, | Performed by: NURSE PRACTITIONER

## 2024-02-01 PROCEDURE — 3066F NEPHROPATHY DOC TX: CPT | Mod: CPTII,,, | Performed by: NURSE PRACTITIONER

## 2024-02-01 NOTE — PROGRESS NOTES
"Patient Name: Fe James   : 1970  MRN: [id number]     SUBJECTIVE DATA:    CHIEF COMPLAINT:   Fe James is a 53 y.o. female who presents to clinic today with Hypertension        HPI:  53-year-old female presents to the clinic to follow-up on hypertension.  Blood pressure controlled, manual blood pressure 138/82.  Patient state blood pressure at home prior to arrival 136/76.  Patient state she is currently takes losartan 100 mg p.o. tablet once daily.  Coreg 3.125 mg tablet twice a day with meals.  Amlodipine 10 mg p.o. once daily.  Nitroglycerin 0.4 mg sublingual as directed.  Hydralazine 50 mg p.o. b.i.d. daily.  Continue current blood pressure medication regiment.  Reminded patient with a appointments coming up with Nephrology, Cardiology and gyn.  Patient to return to clinic as needed.  Patient to return in October for yearly wellness with labs.  Questions solicited and answered, patient verbalized and agreed to plan.    Patient denies chest pain, shortness of breath, dyspnea on exertion, palpitations, peripheral edema, abdominal pain, nausea, vomiting, diarrhea, constipation, fatigue, fever, chills, dysuria,  hematuria, melena, or hematochezia.    ALLERGIES: Review of patient's allergies indicates:  No Known Allergies      ROS:  Review of Systems   All other systems reviewed and are negative.        OBJECTIVE DATA:  Vital signs  Vitals:    24 1352 24 1417   BP: (!) 142/92 138/82   BP Location:  Right arm   Patient Position:  Sitting   Pulse: 69 68   Resp: 18    Temp: 97.8 °F (36.6 °C)    TempSrc: Oral    Weight: 98.4 kg (217 lb)    Height: 5' 9" (1.753 m)       Body mass index is 32.05 kg/m².    PHYSICAL EXAM:   Physical Exam  Vitals and nursing note reviewed.   Constitutional:       General: She is awake. She is not in acute distress.     Appearance: Normal appearance. She is well-developed and well-groomed. She is obese. She is not ill-appearing, toxic-appearing or diaphoretic.   HENT: "      Head: Normocephalic and atraumatic.      Right Ear: Tympanic membrane, ear canal and external ear normal.      Left Ear: Tympanic membrane, ear canal and external ear normal.      Nose: Nose normal.      Mouth/Throat:      Mouth: Mucous membranes are moist.      Pharynx: Oropharynx is clear. Uvula midline.   Eyes:      General: Lids are normal. No scleral icterus.     Extraocular Movements: Extraocular movements intact.      Conjunctiva/sclera: Conjunctivae normal.      Pupils: Pupils are equal, round, and reactive to light.   Neck:      Trachea: Trachea and phonation normal.   Cardiovascular:      Rate and Rhythm: Normal rate and regular rhythm.      Pulses: Normal pulses.           Radial pulses are 2+ on the right side and 2+ on the left side.      Heart sounds: Normal heart sounds. No murmur heard.  Pulmonary:      Effort: Pulmonary effort is normal.      Breath sounds: Normal breath sounds and air entry. No wheezing or rhonchi.   Abdominal:      Palpations: Abdomen is soft.      Tenderness: There is no abdominal tenderness.   Musculoskeletal:         General: Normal range of motion.      Cervical back: Normal range of motion and neck supple.      Right lower leg: No edema.      Left lower leg: No edema.   Skin:     General: Skin is warm and dry.      Capillary Refill: Capillary refill takes less than 2 seconds.   Neurological:      General: No focal deficit present.      Mental Status: She is alert and oriented to person, place, and time. Mental status is at baseline.      GCS: GCS eye subscore is 4. GCS verbal subscore is 5. GCS motor subscore is 6.      Cranial Nerves: No cranial nerve deficit.      Sensory: No sensory deficit.      Motor: No weakness.      Coordination: Coordination normal.      Gait: Gait normal.   Psychiatric:         Attention and Perception: Attention and perception normal.         Mood and Affect: Mood and affect normal.         Speech: Speech normal.         Behavior: Behavior  normal. Behavior is cooperative.         Thought Content: Thought content normal.         Cognition and Memory: Cognition and memory normal.         Judgment: Judgment normal.          ASSESSMENT/PLAN:  1. Primary hypertension  Assessment & Plan:  53-year-old female presents to the clinic to follow-up on hypertension.  Blood pressure controlled, manual blood pressure 138/82.  Patient state blood pressure at home prior to arrival 136/76.  Patient state she is currently takes losartan 100 mg p.o. tablet once daily.  Coreg 3.125 mg tablet twice a day with meals.  Amlodipine 10 mg p.o. once daily.  Nitroglycerin 0.4 mg sublingual as directed.  Hydralazine 50 mg p.o. b.i.d. daily.  Continue current blood pressure medication regiment.  Reminded patient with a appointments coming up with Nephrology, Cardiology and gyn.  Patient to return to clinic as needed.  Patient to return in October for yearly wellness with labs.  Questions solicited and answered, patient verbalized and agreed to plan.      2. Encounter for screening mammogram for breast cancer  Assessment & Plan:  Patient agreed to complete mammogram for breast cancer screening.  Referral initiated.    Orders:  -     Mammo Digital Screening Bilat w/ Baldomero; Future; Expected date: 02/01/2024           RESULTS:  Recent Results (from the past 1008 hour(s))   Comprehensive Metabolic Panel    Collection Time: 01/11/24  1:57 PM   Result Value Ref Range    Sodium Level 140 136 - 145 mmol/L    Potassium Level 4.0 3.5 - 5.1 mmol/L    Chloride 111 (H) 98 - 107 mmol/L    Carbon Dioxide 23 22 - 29 mmol/L    Glucose Level 150 (H) 74 - 100 mg/dL    Blood Urea Nitrogen 33.2 (H) 9.8 - 20.1 mg/dL    Creatinine 2.01 (H) 0.55 - 1.02 mg/dL    Calcium Level Total 8.7 8.4 - 10.2 mg/dL    Protein Total 6.8 6.4 - 8.3 gm/dL    Albumin Level 3.9 3.5 - 5.0 g/dL    Globulin 2.9 2.4 - 3.5 gm/dL    Albumin/Globulin Ratio 1.3 1.1 - 2.0 ratio    Bilirubin Total 0.5 <=1.5 mg/dL    Alkaline Phosphatase  92 40 - 150 unit/L    Alanine Aminotransferase 12 0 - 55 unit/L    Aspartate Aminotransferase 13 5 - 34 unit/L    eGFR 29 mls/min/1.73/m2   PTH, Intact    Collection Time: 01/11/24  1:57 PM   Result Value Ref Range    Parathyroid Hormone Intact 212.3 (H) 8.7 - 77.0 pg/mL   Phosphorus    Collection Time: 01/11/24  1:57 PM   Result Value Ref Range    Phosphorus Level 3.0 2.3 - 4.7 mg/dL   Protein/Creatinine Ratio, Urine    Collection Time: 01/11/24  2:27 PM   Result Value Ref Range    Urine Protein Level 26.9 mg/dL    Urine Creatinine 64.7 45.0 - 106.0 mg/dL    Urine Protein/Creatinine Ratio 0.4    Urinalysis, Reflex to Urine Culture    Collection Time: 01/11/24  2:27 PM    Specimen: Urine   Result Value Ref Range    Color, UA Colorless (A) Yellow, Light-Yellow, Dark Yellow, Graz[place], Straw    Appearance, UA Clear Clear    Specific Gravity, UA 1.010 1.005 - 1.030    pH, UA 5.0 5.0 - 8.5    Protein, UA Trace (A) Negative    Glucose, UA Normal Negative, Normal    Ketones, UA Negative Negative    Blood, UA Negative Negative    Bilirubin, UA Negative Negative    Urobilinogen, UA Normal 0.2, 1.0, Normal    Nitrites, UA Negative Negative    Leukocyte Esterase, UA Negative Negative    WBC, UA 0-5 None Seen, 0-2, 3-5, 0-5 /HPF    Bacteria, UA None Seen None Seen /HPF    Squamous Epithelial Cells, UA Few (A) None Seen /HPF    Mucous, UA Trace (A) None Seen /LPF    Hyaline Casts, UA None Seen None Seen /lpf    RBC, UA 0-5 None Seen, 0-2, 3-5, 0-5 /HPF         Follow Up:  Follow up in about 9 months (around 10/18/2024).     Face to face time 20 minutes, including documentation, chart review, counseling, education, review of test results, relevant medical records, and coordination of care.   I have explained the treatment plan, diagnosis, and prognosis to patient. All questions are answered to the best of my knowledge.     Previous medical history/lab work/radiology reviewed and considered during medical management decisions.    Medication list reviewed and medication reconciliation performed.  Patient was provided  and care about his/her current diagnosis (es) and medications including risk/benefit and side effects/adverse events, over the counter medication uses/doses, home self-care and contact precautions,  and red flags and indications for when to seek immediate medical attention.   Patient was advised to continue compliance with current medication list and medical recommendations.  Patient dvised continued compliance with recommended eating habits/ diets for medical conditions and exercise 150 minutes/ week (if possible) for medical condition (s).  Educational handouts and instructions on selected disease management in AVS (After Visit Summary).    All of the patient's questions were answered to patient's satisfaction.   The patient was receptive, expressed verbal understanding and agreement the above plan.      This note was created with the assistance of a voice recognition software or phone dictation. There may be transcription errors as a result of using this technology however minimal. Effort has been made to assure accuracy of transcription but any obvious errors or omissions should be clarified with the author of the document

## 2024-02-01 NOTE — ASSESSMENT & PLAN NOTE
53-year-old female presents to the clinic to follow-up on hypertension.  Blood pressure controlled, manual blood pressure 138/82.  Patient state blood pressure at home prior to arrival 136/76.  Patient state she is currently takes losartan 100 mg p.o. tablet once daily.  Coreg 3.125 mg tablet twice a day with meals.  Amlodipine 10 mg p.o. once daily.  Nitroglycerin 0.4 mg sublingual as directed.  Hydralazine 50 mg p.o. b.i.d. daily.  Continue current blood pressure medication regiment.  Reminded patient with a appointments coming up with Nephrology, Cardiology and gyn.  Patient to return to clinic as needed.  Patient to return in October for yearly wellness with labs.  Questions solicited and answered, patient verbalized and agreed to plan.

## 2024-02-27 DIAGNOSIS — E79.0 ELEVATED URIC ACID IN BLOOD: ICD-10-CM

## 2024-02-27 RX ORDER — ALLOPURINOL 100 MG/1
100 TABLET ORAL
Qty: 90 TABLET | Refills: 3 | Status: SHIPPED | OUTPATIENT
Start: 2024-02-27

## 2024-04-10 ENCOUNTER — PATIENT MESSAGE (OUTPATIENT)
Dept: NEPHROLOGY | Facility: CLINIC | Age: 54
End: 2024-04-10

## 2024-04-16 ENCOUNTER — LAB VISIT (OUTPATIENT)
Dept: LAB | Facility: HOSPITAL | Age: 54
End: 2024-04-16
Attending: NURSE PRACTITIONER

## 2024-04-16 DIAGNOSIS — N18.4 CKD STAGE G4/A3, GFR 15-29 AND ALBUMIN CREATININE RATIO >300 MG/G: ICD-10-CM

## 2024-04-16 DIAGNOSIS — N25.81 SECONDARY HYPERPARATHYROIDISM OF RENAL ORIGIN: ICD-10-CM

## 2024-04-16 LAB
ALBUMIN SERPL-MCNC: 3.8 G/DL (ref 3.5–5)
ALBUMIN/GLOB SERPL: 1.3 RATIO (ref 1.1–2)
ALP SERPL-CCNC: 94 UNIT/L (ref 40–150)
ALT SERPL-CCNC: 18 UNIT/L (ref 0–55)
AST SERPL-CCNC: 17 UNIT/L (ref 5–34)
BILIRUB SERPL-MCNC: 0.3 MG/DL
BUN SERPL-MCNC: 39.2 MG/DL (ref 9.8–20.1)
CALCIUM SERPL-MCNC: 9 MG/DL (ref 8.4–10.2)
CHLORIDE SERPL-SCNC: 104 MMOL/L (ref 98–107)
CO2 SERPL-SCNC: 20 MMOL/L (ref 22–29)
CREAT SERPL-MCNC: 2.3 MG/DL (ref 0.55–1.02)
CREAT UR-MCNC: 26.3 MG/DL (ref 45–106)
GFR SERPLBLD CREATININE-BSD FMLA CKD-EPI: 25 MLS/MIN/1.73/M2
GLOBULIN SER-MCNC: 3 GM/DL (ref 2.4–3.5)
GLUCOSE SERPL-MCNC: 88 MG/DL (ref 74–100)
PHOSPHATE SERPL-MCNC: 3.3 MG/DL (ref 2.3–4.7)
POTASSIUM SERPL-SCNC: 4.1 MMOL/L (ref 3.5–5.1)
PROT SERPL-MCNC: 6.8 GM/DL (ref 6.4–8.3)
PROT UR STRIP-MCNC: 12 MG/DL
PTH-INTACT SERPL-MCNC: 286.7 PG/ML (ref 8.7–77)
SODIUM SERPL-SCNC: 134 MMOL/L (ref 136–145)
URINE PROTEIN/CREATININE RATIO (OLG): 0.5

## 2024-04-16 PROCEDURE — 83970 ASSAY OF PARATHORMONE: CPT

## 2024-04-16 PROCEDURE — 36415 COLL VENOUS BLD VENIPUNCTURE: CPT

## 2024-04-16 PROCEDURE — 84100 ASSAY OF PHOSPHORUS: CPT

## 2024-04-16 PROCEDURE — 84156 ASSAY OF PROTEIN URINE: CPT

## 2024-04-16 PROCEDURE — 80053 COMPREHEN METABOLIC PANEL: CPT

## 2024-04-16 PROCEDURE — 82570 ASSAY OF URINE CREATININE: CPT

## 2024-04-17 ENCOUNTER — DOCUMENTATION ONLY (OUTPATIENT)
Dept: NEPHROLOGY | Facility: CLINIC | Age: 54
End: 2024-04-17

## 2024-04-17 NOTE — PROGRESS NOTES
Patient was no-show to clinic today. If patient calls back to reschedule, please schedule within 3 months.  Thank you

## 2024-04-25 DIAGNOSIS — I10 PRIMARY HYPERTENSION: ICD-10-CM

## 2024-04-25 RX ORDER — AMLODIPINE BESYLATE 10 MG/1
10 TABLET ORAL
Qty: 90 TABLET | Refills: 3 | Status: SHIPPED | OUTPATIENT
Start: 2024-04-25

## 2024-07-10 DIAGNOSIS — N18.9 CHRONIC KIDNEY DISEASE, UNSPECIFIED CKD STAGE: Primary | ICD-10-CM

## 2024-07-16 ENCOUNTER — LAB VISIT (OUTPATIENT)
Dept: LAB | Facility: HOSPITAL | Age: 54
End: 2024-07-16
Attending: NURSE PRACTITIONER
Payer: COMMERCIAL

## 2024-07-16 DIAGNOSIS — N18.9 CHRONIC KIDNEY DISEASE, UNSPECIFIED CKD STAGE: ICD-10-CM

## 2024-07-16 LAB
ALBUMIN SERPL-MCNC: 3.9 G/DL (ref 3.5–5)
ALBUMIN/GLOB SERPL: 1.3 RATIO (ref 1.1–2)
ALP SERPL-CCNC: 89 UNIT/L (ref 40–150)
ALT SERPL-CCNC: 18 UNIT/L (ref 0–55)
ANION GAP SERPL CALC-SCNC: 7 MEQ/L
AST SERPL-CCNC: 18 UNIT/L (ref 5–34)
BASOPHILS # BLD AUTO: 0.03 X10(3)/MCL
BASOPHILS NFR BLD AUTO: 0.7 %
BILIRUB SERPL-MCNC: 0.5 MG/DL
BUN SERPL-MCNC: 31.3 MG/DL (ref 9.8–20.1)
CALCIUM SERPL-MCNC: 8.5 MG/DL (ref 8.4–10.2)
CHLORIDE SERPL-SCNC: 108 MMOL/L (ref 98–107)
CO2 SERPL-SCNC: 21 MMOL/L (ref 22–29)
CREAT SERPL-MCNC: 1.85 MG/DL (ref 0.55–1.02)
CREAT/UREA NIT SERPL: 17
EOSINOPHIL # BLD AUTO: 0.15 X10(3)/MCL (ref 0–0.9)
EOSINOPHIL NFR BLD AUTO: 3.3 %
ERYTHROCYTE [DISTWIDTH] IN BLOOD BY AUTOMATED COUNT: 13.3 % (ref 11.5–17)
GFR SERPLBLD CREATININE-BSD FMLA CKD-EPI: 32 ML/MIN/1.73/M2
GLOBULIN SER-MCNC: 3 GM/DL (ref 2.4–3.5)
GLUCOSE SERPL-MCNC: 96 MG/DL (ref 74–100)
HCT VFR BLD AUTO: 39.7 % (ref 37–47)
HGB BLD-MCNC: 12.7 G/DL (ref 12–16)
IMM GRANULOCYTES # BLD AUTO: 0.01 X10(3)/MCL (ref 0–0.04)
IMM GRANULOCYTES NFR BLD AUTO: 0.2 %
LYMPHOCYTES # BLD AUTO: 1.05 X10(3)/MCL (ref 0.6–4.6)
LYMPHOCYTES NFR BLD AUTO: 23.2 %
MCH RBC QN AUTO: 27.4 PG (ref 27–31)
MCHC RBC AUTO-ENTMCNC: 32 G/DL (ref 33–36)
MCV RBC AUTO: 85.7 FL (ref 80–94)
MONOCYTES # BLD AUTO: 0.5 X10(3)/MCL (ref 0.1–1.3)
MONOCYTES NFR BLD AUTO: 11 %
NEUTROPHILS # BLD AUTO: 2.79 X10(3)/MCL (ref 2.1–9.2)
NEUTROPHILS NFR BLD AUTO: 61.6 %
NRBC BLD AUTO-RTO: 0 %
PLATELET # BLD AUTO: 268 X10(3)/MCL (ref 130–400)
PMV BLD AUTO: 10.4 FL (ref 7.4–10.4)
POTASSIUM SERPL-SCNC: 4 MMOL/L (ref 3.5–5.1)
PROT SERPL-MCNC: 6.9 GM/DL (ref 6.4–8.3)
PTH-INTACT SERPL-MCNC: 286.3 PG/ML (ref 8.7–77)
RBC # BLD AUTO: 4.63 X10(6)/MCL (ref 4.2–5.4)
SODIUM SERPL-SCNC: 136 MMOL/L (ref 136–145)
WBC # BLD AUTO: 4.53 X10(3)/MCL (ref 4.5–11.5)

## 2024-07-16 PROCEDURE — 36415 COLL VENOUS BLD VENIPUNCTURE: CPT

## 2024-07-16 PROCEDURE — 85025 COMPLETE CBC W/AUTO DIFF WBC: CPT

## 2024-07-16 PROCEDURE — 80053 COMPREHEN METABOLIC PANEL: CPT

## 2024-07-16 PROCEDURE — 83970 ASSAY OF PARATHORMONE: CPT

## 2024-07-17 ENCOUNTER — DOCUMENTATION ONLY (OUTPATIENT)
Dept: NEPHROLOGY | Facility: CLINIC | Age: 54
End: 2024-07-17
Payer: COMMERCIAL

## 2024-07-17 NOTE — PROGRESS NOTES
Patient was no-show to clinic today. If patient calls back to reschedule, please schedule 1st available follow up appointment.  Thank you

## 2024-07-22 DIAGNOSIS — N25.81 SECONDARY HYPERPARATHYROIDISM OF RENAL ORIGIN: ICD-10-CM

## 2024-07-22 RX ORDER — CALCITRIOL 0.25 UG/1
0.25 CAPSULE ORAL EVERY OTHER DAY
Qty: 15 CAPSULE | Refills: 1 | Status: SHIPPED | OUTPATIENT
Start: 2024-07-22

## 2024-08-19 ENCOUNTER — OFFICE VISIT (OUTPATIENT)
Dept: CARDIOLOGY | Facility: CLINIC | Age: 54
End: 2024-08-19
Payer: COMMERCIAL

## 2024-08-19 VITALS
RESPIRATION RATE: 20 BRPM | BODY MASS INDEX: 30.9 KG/M2 | DIASTOLIC BLOOD PRESSURE: 86 MMHG | WEIGHT: 208.63 LBS | SYSTOLIC BLOOD PRESSURE: 128 MMHG | HEIGHT: 69 IN | TEMPERATURE: 98 F | OXYGEN SATURATION: 100 % | HEART RATE: 78 BPM

## 2024-08-19 DIAGNOSIS — I10 PRIMARY HYPERTENSION: ICD-10-CM

## 2024-08-19 DIAGNOSIS — R01.1 MURMUR: ICD-10-CM

## 2024-08-19 DIAGNOSIS — N18.30 STAGE 3 CHRONIC KIDNEY DISEASE, UNSPECIFIED WHETHER STAGE 3A OR 3B CKD: ICD-10-CM

## 2024-08-19 DIAGNOSIS — R94.39 ABNORMAL STRESS TEST: Primary | ICD-10-CM

## 2024-08-19 DIAGNOSIS — R06.02 SOB (SHORTNESS OF BREATH) ON EXERTION: ICD-10-CM

## 2024-08-19 LAB
OHS QRS DURATION: 146 MS
OHS QTC CALCULATION: 497 MS

## 2024-08-19 PROCEDURE — 4010F ACE/ARB THERAPY RXD/TAKEN: CPT | Mod: CPTII,,, | Performed by: NURSE PRACTITIONER

## 2024-08-19 PROCEDURE — 3079F DIAST BP 80-89 MM HG: CPT | Mod: CPTII,,, | Performed by: NURSE PRACTITIONER

## 2024-08-19 PROCEDURE — 99214 OFFICE O/P EST MOD 30 MIN: CPT | Mod: S$PBB,,, | Performed by: NURSE PRACTITIONER

## 2024-08-19 PROCEDURE — 93005 ELECTROCARDIOGRAM TRACING: CPT

## 2024-08-19 PROCEDURE — 1160F RVW MEDS BY RX/DR IN RCRD: CPT | Mod: CPTII,,, | Performed by: NURSE PRACTITIONER

## 2024-08-19 PROCEDURE — 3008F BODY MASS INDEX DOCD: CPT | Mod: CPTII,,, | Performed by: NURSE PRACTITIONER

## 2024-08-19 PROCEDURE — 99214 OFFICE O/P EST MOD 30 MIN: CPT | Mod: PBBFAC,25 | Performed by: NURSE PRACTITIONER

## 2024-08-19 PROCEDURE — 3066F NEPHROPATHY DOC TX: CPT | Mod: CPTII,,, | Performed by: NURSE PRACTITIONER

## 2024-08-19 PROCEDURE — 1159F MED LIST DOCD IN RCRD: CPT | Mod: CPTII,,, | Performed by: NURSE PRACTITIONER

## 2024-08-19 PROCEDURE — 3074F SYST BP LT 130 MM HG: CPT | Mod: CPTII,,, | Performed by: NURSE PRACTITIONER

## 2024-08-19 RX ORDER — LOSARTAN POTASSIUM 100 MG/1
100 TABLET ORAL DAILY
Qty: 90 TABLET | Refills: 3 | Status: SHIPPED | OUTPATIENT
Start: 2024-08-19 | End: 2025-08-19

## 2024-08-19 RX ORDER — CARVEDILOL 3.12 MG/1
3.12 TABLET ORAL 2 TIMES DAILY WITH MEALS
Qty: 180 TABLET | Refills: 3 | Status: SHIPPED | OUTPATIENT
Start: 2024-08-19 | End: 2025-08-19

## 2024-08-19 NOTE — PROGRESS NOTES
CHIEF COMPLAINT:   Chief Complaint   Patient presents with    Follow-up     Denies any cardiac problems                                                  HPI:  Fe James 54 y.o. female with HTN and CKD who presents to cardiology clinic for routine follow up and ongoing care.  Due to previous complaints of exertional dyspnea and intermittent chest pain, the patient completed a Nuclear stress test on 23  that was equivocal due to LBBB, with a mild-to-moderate intensity, small to moderate-sized, equivocal mostly fixed perfusion abnormality with some reversibility in the basal to mid septal wall that is currently being medically managed. Echocardiogram obtained on 23 revealed preserved EF of 65%, normal diastolic function and no significant valvular abnormalities.  Seven Day Event monitor obtained in 2023 revealed underlying normal sinus rhythm with no significant arrhythmias noted.      Patient presents to clinic today reporting that she feels good.  She denies any chest pain, palpitations, orthopnea, PND, peripheral edema, claudication, lightheadedness, dizziness, near-syncope or syncope.  She endorses significant improvement in her previous exertional dyspnea, citing symptoms with over exertion only.  The patient is able to perform her routine heavy housework, lawn care with a riding mower, and walk her dog in the evenings without experiencing any chest pain or shortness of breath.  She endorses compliance with her current medications and is currently tolerating without any issues.      Social History:  Denies any Illicit drug, ETOH or tobacco use  Family History: Mom-  of massive CVA at age 56-; Dad-  of CVA at age 72, Brother- Downs Syndrome-enlarged heart: sister-  of brain aneurysm at age 16                                                                                                                                                                                                                                                                                                  CARDIAC TESTING:   Nuclear Stress Test 7.27.23    Equivocal myocardial perfusion scan.    There is a mild to moderate intensity, small to moderate sized, equivocal mostly fixed perfusion abnormality with some reversibilty in the basal to mid septal wall(s). This finding is equivocal due to LBBB.    There are no other significant perfusion abnormalities.    The gated perfusion images showed an ejection fraction of 51% at rest. The gated perfusion images showed an ejection fraction of 67% post stress. Normal ejection fraction is greater than 53%.    The patient reported no chest pain during the stress test.       ECHO 7.27.23  Concentric hypertrophy and normal systolic function.  The estimated ejection fraction is 65%.  Normal left ventricular diastolic function.  Normal right ventricular size.  Normal central venous pressure (3 mmHg).  The estimated PA systolic pressure is 16 mmHg.    7 day Event Monitor 7.3.23-7.9.23  [place] present included: <1% Bradycardia, <1 Tachycardia (1 episode), <1% Supraventricular (2 episodes), 2.24% Ventricular (9 episodes, 94.66% NSR (3 episodes), 2.87% Other [place]    Physician Interpretation  Underlying rhythm is sinus.  During symptoms of chest discomfort the patient is in sinus with HR 92-94.  During manually marked events (but no symptom reported) the patient is in sinus with HR .  No significant arrhythmias noted         Patient Active Problem List   Diagnosis    Primary hypertension    Elevated uric acid in blood    Heart murmur    Stage 3 chronic kidney disease    Encounter for colorectal cancer screening    Murmur    SOB (shortness of breath) on exertion    Obesity    Plantar fasciitis    Sciatica    Psychophysiological insomnia    Impacted cerumen of left ear    Anxiety and depression    Positive depression screening    Abnormal stress test    Encounter for screening mammogram for  breast cancer     Past Surgical History:   Procedure Laterality Date    BILATERAL TUBAL LIGATION      TONSILLECTOMY      TUBAL LIGATION       Social History     Socioeconomic History    Marital status:     Highest education level: Some college, no degree   Occupational History    Occupation: stor manager at [employer]   Tobacco Use    Smoking status: Never    Smokeless tobacco: Never   Substance and Sexual Activity    Alcohol use: Not Currently    Drug use: Never    Sexual activity: Not Currently     Partners: Male   Social History Narrative    ** Merged History Encounter **          Social Determinants of Health     Financial Resource Strain: Low Risk  (10/5/2022)    Overall Financial Resource Strain (CARDIA)     Difficulty of Paying Living Expenses: Not hard at all   Food Insecurity: No Food Insecurity (10/5/2022)    Hunger Vital Sign     Worried About Running Out of Food in the Last Year: Never true     Ran Out of Food in the Last Year: Never true   Transportation Needs: No Transportation Needs (10/5/2022)    PRAPARE - Transportation     Lack of Transportation (Medical): No     Lack of Transportation (Non-Medical): No   Physical Activity: Inactive (10/5/2022)    Exercise Vital Sign     Days of Exercise per Week: 0 days     Minutes of Exercise per Session: 0 min   Stress: No Stress Concern Present (10/5/2022)    [place] [employer] - Occupational Stress Questionnaire     Feeling of Stress : Not at all   Housing Stability: Low Risk  (10/5/2022)    Housing Stability Vital Sign     Unable to Pay for Housing in the Last Year: No     Number of Places Lived in the Last Year: 1     Unstable Housing in the Last Year: No        Family History   Problem Relation Name Age of Onset    Kidney disease Mother Nithya Verduzco     Stroke Mother Nithya Verduzco     Stroke Father Chema Verduzco     Aneurysm Sister      Hypertension Sister Caity Verduzco     Kidney disease Brother      Down syndrome  "Brother      No Known Problems Brother       Review of patient's allergies indicates:  No Known Allergies      ROS:  Review of Systems   Constitutional: Negative.    HENT: Negative.     Eyes: Negative.    Respiratory:  Positive for shortness of breath.    Cardiovascular:  Positive for chest pain, palpitations and leg swelling.   Gastrointestinal: Negative.    Genitourinary: Negative.    Musculoskeletal: Negative.    Skin: Negative.    Neurological: Negative.    Endo/Heme/Allergies: Negative.    Psychiatric/Behavioral: Negative.                                                                                                                                                                                  Negative except as stated in the history of present illness. See HPI for details.    PHYSICAL EXAM:  Visit Vitals  /86 (BP Location: Right arm, Patient Position: Sitting, BP Method: Large (Automatic))   Pulse 78   Temp 97.7 °F (36.5 °C) (Oral)   Resp 20   Ht 5' 9" (1.753 m)   Wt 94.6 kg (208 lb 9.6 oz)   SpO2 100%   BMI 30.80 kg/m²           Physical Exam  HENT:      Head: Normocephalic.      Nose: Nose normal.   Eyes:      Pupils: Pupils are equal, round, and reactive to light.   Cardiovascular:      Rate and Rhythm: Normal rate and regular rhythm.      Heart sounds: Murmur heard.   Pulmonary:      Effort: Pulmonary effort is normal.   Abdominal:      General: Abdomen is flat. Bowel sounds are normal.      Palpations: Abdomen is soft.   Musculoskeletal:         General: Normal range of motion.      Cervical back: Normal range of motion.   Skin:     General: Skin is warm.   Neurological:      General: No focal deficit present.      Mental Status: She is alert.   Psychiatric:         Mood and Affect: Mood normal.       Current Outpatient Medications   Medication Instructions    allopurinoL (ZYLOPRIM) 100 mg, Oral    amLODIPine (NORVASC) 10 mg, Oral    calcitRIOL (ROCALTROL) 0.25 mcg, Oral, Every other day    " carvediloL (COREG) 3.125 mg, Oral, 2 times daily with meals    cloNIDine (CATAPRES) 0.1 mg, Oral, Daily PRN, If BP SYSTOLOIC 170 AND DYASTOLIC 100.    hydrALAZINE (APRESOLINE) 50 mg, Oral, 2 times daily    losartan (COZAAR) 100 mg, Oral, Daily    nitroGLYCERIN (NITROSTAT) 0.4 mg, Sublingual, Every 5 min PRN    traZODone (DESYREL) 25 mg, Oral, Nightly        All medications, laboratory studies, cardiac diagnostic imaging reviewed.     Lab Results   Component Value Date    LDL 97.00 10/05/2022    LDL 93.00 06/16/2021    TRIG 117 10/05/2022    TRIG 111 06/16/2021    CREATININE 1.85 (H) 07/16/2024    MG 2.35 12/28/2020    K 4.0 07/16/2024        ASSESSMENT/PLAN:  Equivocal stress test  - Mild to moderate intensity, small to moderate sized, equivocal mostly fixed perfusion abnormality with some reversibilty in the basal to mid septal wall(s). This finding is equivocal due to LBBB.(7.27.23).  Currently being medically managed  - Denies CP. Reports exertional dyspnea with any strenuous activities.  Able to perform routine housework and activities without angina or equivalent symptoms  - Continue medication management with amlodipine 10 mg, coreg 3.125 mg BID and SL nitro prn CP  - Instructed patient to notify clinic of any anginal/anginal equivalent symptoms.  If symptoms persist despite optimized antianginals, consider repeating ischemic evaluation  - Strict ED precautions provided     SOB with exertion- significantly improved  - LVEF -65%, normal DD, and no significant valvular abnormalities per ECHO 7.27.23  - Reports exertional dyspnea with over exertion only.  Able to perform routine housework and activities without any shortness of breath    Palpitations -resolved   - 7 day event monitor- underlying normal sinus rhythm with no significant arrhythmias noted (July 2023)  - Continue Coreg 3.125 mg BID  - Advised to avoid/decrease caffeine intake and remain well hydrated      HTN  - BP at goal   - Continue Coreg 3.125 mg  BID, losartan 100 mg,  amlodipine 10 mg, and Hydralazine 50 mg BID  - Continue Management per PCP     CKD  - Management per nephrology     EKG  Follow-up in cardiology clinic in 6 months with [facility] or sooner if needed  Follow-up with PCP/nephrology as directed  ED precautions

## 2024-08-19 NOTE — PATIENT INSTRUCTIONS
Follow-up in cardiology clinic in 6 months with FLP or sooner if needed  Follow-up with PCP/nephrology as directed  ED precautions

## 2024-09-19 DIAGNOSIS — N25.81 SECONDARY HYPERPARATHYROIDISM OF RENAL ORIGIN: ICD-10-CM

## 2024-09-19 RX ORDER — CALCITRIOL 0.25 UG/1
0.25 CAPSULE ORAL EVERY OTHER DAY
Qty: 15 CAPSULE | Refills: 1 | Status: SHIPPED | OUTPATIENT
Start: 2024-09-19

## 2024-10-18 DIAGNOSIS — Z12.31 ENCOUNTER FOR SCREENING MAMMOGRAM FOR BREAST CANCER: Primary | ICD-10-CM

## 2024-11-13 DIAGNOSIS — N18.9 CHRONIC KIDNEY DISEASE, UNSPECIFIED CKD STAGE: Primary | ICD-10-CM

## 2024-11-19 ENCOUNTER — LAB VISIT (OUTPATIENT)
Dept: LAB | Facility: HOSPITAL | Age: 54
End: 2024-11-19
Attending: NURSE PRACTITIONER
Payer: COMMERCIAL

## 2024-11-19 DIAGNOSIS — N18.9 CHRONIC KIDNEY DISEASE, UNSPECIFIED CKD STAGE: ICD-10-CM

## 2024-11-19 LAB
ALBUMIN SERPL-MCNC: 3.6 G/DL (ref 3.5–5)
ALBUMIN/GLOB SERPL: 1.2 RATIO (ref 1.1–2)
ALP SERPL-CCNC: 86 UNIT/L (ref 40–150)
ALT SERPL-CCNC: 10 UNIT/L (ref 0–55)
ANION GAP SERPL CALC-SCNC: 7 MEQ/L
AST SERPL-CCNC: 12 UNIT/L (ref 5–34)
BILIRUB SERPL-MCNC: 0.4 MG/DL
BUN SERPL-MCNC: 36 MG/DL (ref 9.8–20.1)
CALCIUM SERPL-MCNC: 8.9 MG/DL (ref 8.4–10.2)
CHLORIDE SERPL-SCNC: 112 MMOL/L (ref 98–107)
CO2 SERPL-SCNC: 24 MMOL/L (ref 22–29)
CREAT SERPL-MCNC: 2.12 MG/DL (ref 0.55–1.02)
CREAT/UREA NIT SERPL: 17
GFR SERPLBLD CREATININE-BSD FMLA CKD-EPI: 27 ML/MIN/1.73/M2
GLOBULIN SER-MCNC: 2.9 GM/DL (ref 2.4–3.5)
GLUCOSE SERPL-MCNC: 81 MG/DL (ref 74–100)
POTASSIUM SERPL-SCNC: 4.1 MMOL/L (ref 3.5–5.1)
PROT SERPL-MCNC: 6.5 GM/DL (ref 6.4–8.3)
PTH-INTACT SERPL-MCNC: 214.8 PG/ML (ref 8.7–77)
SODIUM SERPL-SCNC: 143 MMOL/L (ref 136–145)

## 2024-11-19 PROCEDURE — 83970 ASSAY OF PARATHORMONE: CPT

## 2024-11-19 PROCEDURE — 80053 COMPREHEN METABOLIC PANEL: CPT

## 2024-11-19 PROCEDURE — 36415 COLL VENOUS BLD VENIPUNCTURE: CPT

## 2024-11-20 ENCOUNTER — OFFICE VISIT (OUTPATIENT)
Dept: NEPHROLOGY | Facility: CLINIC | Age: 54
End: 2024-11-20
Payer: COMMERCIAL

## 2024-11-20 VITALS
DIASTOLIC BLOOD PRESSURE: 80 MMHG | RESPIRATION RATE: 18 BRPM | SYSTOLIC BLOOD PRESSURE: 138 MMHG | BODY MASS INDEX: 30.85 KG/M2 | OXYGEN SATURATION: 99 % | WEIGHT: 208.31 LBS | TEMPERATURE: 98 F | HEIGHT: 69 IN | HEART RATE: 73 BPM

## 2024-11-20 DIAGNOSIS — I10 PRIMARY HYPERTENSION: ICD-10-CM

## 2024-11-20 DIAGNOSIS — E79.0 ELEVATED URIC ACID IN BLOOD: ICD-10-CM

## 2024-11-20 DIAGNOSIS — N18.4 CKD STAGE G4/A3, GFR 15-29 AND ALBUMIN CREATININE RATIO >300 MG/G: Primary | ICD-10-CM

## 2024-11-20 DIAGNOSIS — N25.81 SECONDARY HYPERPARATHYROIDISM OF RENAL ORIGIN: ICD-10-CM

## 2024-11-20 PROCEDURE — 1159F MED LIST DOCD IN RCRD: CPT | Mod: CPTII,,, | Performed by: NURSE PRACTITIONER

## 2024-11-20 PROCEDURE — 4010F ACE/ARB THERAPY RXD/TAKEN: CPT | Mod: CPTII,,, | Performed by: NURSE PRACTITIONER

## 2024-11-20 PROCEDURE — 3079F DIAST BP 80-89 MM HG: CPT | Mod: CPTII,,, | Performed by: NURSE PRACTITIONER

## 2024-11-20 PROCEDURE — 1160F RVW MEDS BY RX/DR IN RCRD: CPT | Mod: CPTII,,, | Performed by: NURSE PRACTITIONER

## 2024-11-20 PROCEDURE — 3075F SYST BP GE 130 - 139MM HG: CPT | Mod: CPTII,,, | Performed by: NURSE PRACTITIONER

## 2024-11-20 PROCEDURE — 99214 OFFICE O/P EST MOD 30 MIN: CPT | Mod: PBBFAC | Performed by: NURSE PRACTITIONER

## 2024-11-20 PROCEDURE — 3066F NEPHROPATHY DOC TX: CPT | Mod: CPTII,,, | Performed by: NURSE PRACTITIONER

## 2024-11-20 PROCEDURE — 99214 OFFICE O/P EST MOD 30 MIN: CPT | Mod: S$PBB,,, | Performed by: NURSE PRACTITIONER

## 2024-11-20 PROCEDURE — 3008F BODY MASS INDEX DOCD: CPT | Mod: CPTII,,, | Performed by: NURSE PRACTITIONER

## 2024-11-20 RX ORDER — HYDRALAZINE HYDROCHLORIDE 50 MG/1
50 TABLET, FILM COATED ORAL 2 TIMES DAILY
Qty: 180 TABLET | Refills: 3 | Status: SHIPPED | OUTPATIENT
Start: 2024-11-20 | End: 2025-11-20

## 2024-11-20 RX ORDER — LOSARTAN POTASSIUM 100 MG/1
100 TABLET ORAL DAILY
Qty: 90 TABLET | Refills: 3 | Status: SHIPPED | OUTPATIENT
Start: 2024-11-20 | End: 2025-11-20

## 2024-11-20 RX ORDER — CARVEDILOL 3.12 MG/1
3.12 TABLET ORAL 2 TIMES DAILY WITH MEALS
Qty: 180 TABLET | Refills: 3 | Status: SHIPPED | OUTPATIENT
Start: 2024-11-20 | End: 2025-11-20

## 2024-11-20 RX ORDER — AMLODIPINE BESYLATE 10 MG/1
10 TABLET ORAL DAILY
Qty: 90 TABLET | Refills: 1 | Status: SHIPPED | OUTPATIENT
Start: 2024-11-20 | End: 2025-05-19

## 2024-11-20 RX ORDER — CALCITRIOL 0.25 UG/1
0.25 CAPSULE ORAL EVERY OTHER DAY
Qty: 45 CAPSULE | Refills: 1 | Status: SHIPPED | OUTPATIENT
Start: 2024-11-20 | End: 2025-05-19

## 2024-11-20 RX ORDER — ALLOPURINOL 100 MG/1
100 TABLET ORAL DAILY
Qty: 90 TABLET | Refills: 1 | Status: SHIPPED | OUTPATIENT
Start: 2024-11-20 | End: 2025-05-19

## 2024-11-20 NOTE — PROGRESS NOTES
"[facility]  Nephrology Clinic Note    Chief complaint: Follow-up (RTC, took meds, w/o complaints)    History of present illness:   Fe James is a 54 y.o. Black or  female with past medical history of chronic kidney disease, hypertension, hyperuricemia, and obesity. Presents for follow-up appointment, denies complaints.    Review of Systems  12 point review of systems conducted, negative except as stated in the history of present illness.    Allergies: Patient has No Known Allergies.     Past Medical History:  has a past medical history of CKD (chronic kidney disease) stage 3, GFR 30-59 ml/min, Gout, unspecified, HTN (hypertension), and Murmur, cardiac.    Procedure History:  has a past surgical history that includes Bilateral tubal ligation; Tonsillectomy; and Tubal ligation.    Family History: family history includes Aneurysm in her sister; Down syndrome in her brother; Hypertension in her sister; Kidney disease in her brother and mother; No Known Problems in her brother; Stroke in her father and mother.    Social History:  reports that she has never smoked. She has never used smokeless tobacco. She reports that she does not currently use alcohol. She reports that she does not use drugs.    Physical exam  /80 (BP Location: Right arm, Patient Position: Sitting)   Pulse 73   Temp 97.9 °F (36.6 °C) (Oral)   Resp 18   Ht 5' 8.9" (1.75 m)   Wt 94.5 kg (208 lb 5.4 oz)   SpO2 99%   BMI 30.86 kg/m²   General appearance: Patient is in no acute distress.  Skin: No rashes or wounds.  HEENT: PERRLA, EOMI, no scleral icterus, no JVD. Neck is supple.  Chest: Respirations are unlabored. Lungs sounds are clear.   Heart: S1, S2.   Abdomen: Benign.  : Deferred.  Extremities: No edema, peripheral pulses are palpable.   Neuro: No focal deficits.     Home Medications:    Current Outpatient Medications:     nitroGLYCERIN (NITROSTAT) 0.4 MG SL tablet, Place 1 tablet (0.4 mg " total) under the tongue every 5 (five) minutes as needed., Disp: 25 tablet, Rfl: 3    allopurinoL (ZYLOPRIM) 100 MG tablet, Take 1 tablet (100 mg total) by mouth once daily., Disp: 90 tablet, Rfl: 1    amLODIPine (NORVASC) 10 MG tablet, Take 1 tablet (10 mg total) by mouth once daily., Disp: 90 tablet, Rfl: 1    calcitRIOL (ROCALTROL) 0.25 MCG Cap, Take 1 capsule (0.25 mcg total) by mouth every other day., Disp: 45 capsule, Rfl: 1    carvediloL (COREG) 3.125 MG tablet, Take 1 tablet (3.125 mg total) by mouth 2 (two) times daily with meals., Disp: 180 tablet, Rfl: 3    hydrALAZINE (APRESOLINE) 50 MG tablet, Take 1 tablet (50 mg total) by mouth 2 (two) times a day., Disp: 180 tablet, Rfl: 3    losartan (COZAAR) 100 MG tablet, Take 1 tablet (100 mg total) by mouth once daily., Disp: 90 tablet, Rfl: 3    Laboratory data    Lab Results   Component Value Date    WBC 4.53 07/16/2024    HGB 12.7 07/16/2024    HCT 39.7 07/16/2024     07/16/2024     11/19/2024    K 4.1 11/19/2024    CO2 24 11/19/2024    BUN 36.0 (H) 11/19/2024    CREATININE 2.12 (H) 11/19/2024    EGFRNORACEVR 27 11/19/2024    GLUCOSE 81 11/19/2024    CALCIUM 8.9 11/19/2024    ALKPHOS 86 11/19/2024    LABPROT 6.5 11/19/2024    ALBUMIN 3.6 11/19/2024    BILIDIR 0.1 06/16/2021    IBILI 0.20 06/16/2021    AST 12 11/19/2024    ALT 10 11/19/2024    MG 2.35 12/28/2020    PHOS 3.3 04/16/2024      Lab Results   Component Value Date    HGBA1C 5.2 10/05/2022    .8 (H) 11/19/2024    [id number] 54.1 12/28/2020    ANAHS <1:80 (Negative) 09/05/2023    CANCA Negative 09/05/2023    PANCA Negative 09/05/2023    HIV Nonreactive 09/05/2023    HEPCAB Nonreactive 09/05/2023    MSPIKEPCT  09/05/2023      Comment:      None observed.     Urine:  Lab Results   Component Value Date    APPEARANCEUA Clear 01/11/2024    SGUA 1.010 01/11/2024    PROTEINUA Trace (A) 01/11/2024    KETONESUA Negative 01/11/2024    LEUKOCYTESUR Negative 01/11/2024    RBCUA 0-5 01/11/2024     WBCUA 0-5 01/11/2024    BACTERIA None Seen 01/11/2024    SQEPUA Few (A) 01/11/2024    HYALINECASTS None Seen 01/11/2024    CREATRANDUR 26.3 (L) 04/16/2024    PROTEINURINE 12.0 04/16/2024    UPROTCREA 0.5 04/16/2024         Imaging  US Retroperitoneal Limited 03/03/2020     Grayscale and color Doppler images of the kidneys were obtained. The  right kidney measures 9 cm and the left kidney measures 9 cm in  length. There is heterogeneous renal parenchymal echogenicity with  loss of corticomedullary differentiation. There is no hydronephrosis.  There is a 2 cm cyst of the left kidney.    Impression  Findings suggestive of medical renal disease.    Impression    ICD-10-CM ICD-9-CM   1. CKD stage G4/A3, GFR 15-29 and albumin creatinine ratio >300 mg/g  N18.4 585.4   2. Primary hypertension  I10 401.9   3. Secondary hyperparathyroidism of renal origin  N25.81 588.81   4. BMI 30.0-30.9,adult  Z68.30 V85.30   5. Elevated uric acid in blood  E79.0 790.6        Plan  CKD stage G4/A3, GFR 15-29 and albumin creatinine ratio >300 mg/g  Proteinuria evaluation revealed negative OREN, negative ANCA, SPEP negative for M spike, negative hepatitis panel, negative HIV.  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  There are no indications for immunosuppression.  Continue risk factor management, ILAN blockade, and treatment of complications advanced CKD.      Primary hypertension  Blood pressure reading is at goal, continue current antihypertensive regimen and 2 g a day dietary sodium restriction.      Secondary hyperparathyroidism of renal origin  Continue calcitriol as prescribed.    BMI 30.0-30.9,adult  Lifestyle and dietary interventions discussed, patient encouraged to maintain non-sedentary lifestyle and well-balanced diet.     Hyperuricemia   Continue allopurinol.  Patient denies any gout  flares.    Orders Placed This Encounter   Procedures    Comprehensive Metabolic Panel     Standing Status:   Future     Standing Expiration Date:   6/10/2025    CBC Auto Differential     Standing Status:   Future     Standing Expiration Date:   6/10/2025    Protein/Creatinine Ratio, Urine     Standing Status:   Future     Standing Expiration Date:   6/10/2025     Order Specific Question:   Specimen Source     Answer:   Urine    Urinalysis, Reflex to Urine Culture     Standing Status:   Future     Standing Expiration Date:   6/10/2025     Order Specific Question:   Specimen Source     Answer:   Urine    Phosphorus     Standing Status:   Future     Standing Expiration Date:   6/10/2025    PTH, Intact     Standing Status:   Future     Standing Expiration Date:   6/10/2025     Follow up in about 6 months (around 5/20/2025).       Jazmin Huggins NP  [facility]phrology

## 2024-12-23 ENCOUNTER — OFFICE VISIT (OUTPATIENT)
Dept: FAMILY MEDICINE | Facility: CLINIC | Age: 54
End: 2024-12-23
Payer: COMMERCIAL

## 2024-12-23 VITALS
BODY MASS INDEX: 32.13 KG/M2 | HEART RATE: 65 BPM | HEIGHT: 68 IN | TEMPERATURE: 97 F | DIASTOLIC BLOOD PRESSURE: 80 MMHG | RESPIRATION RATE: 18 BRPM | SYSTOLIC BLOOD PRESSURE: 131 MMHG | OXYGEN SATURATION: 98 % | WEIGHT: 212 LBS

## 2024-12-23 DIAGNOSIS — Z12.31 ENCOUNTER FOR SCREENING MAMMOGRAM FOR BREAST CANCER: ICD-10-CM

## 2024-12-23 DIAGNOSIS — N18.4 CKD (CHRONIC KIDNEY DISEASE) STAGE 4, GFR 15-29 ML/MIN: ICD-10-CM

## 2024-12-23 DIAGNOSIS — I10 PRIMARY HYPERTENSION: Primary | ICD-10-CM

## 2024-12-23 LAB
CREAT UR-MCNC: 65.9 MG/DL (ref 45–106)
MICROALBUMIN UR-MCNC: 195.8 UG/ML
MICROALBUMIN/CREAT RATIO PNL UR: 297.1 MG/GM CR (ref 0–30)

## 2024-12-23 PROCEDURE — 1159F MED LIST DOCD IN RCRD: CPT | Mod: CPTII,,, | Performed by: NURSE PRACTITIONER

## 2024-12-23 PROCEDURE — 4010F ACE/ARB THERAPY RXD/TAKEN: CPT | Mod: CPTII,,, | Performed by: NURSE PRACTITIONER

## 2024-12-23 PROCEDURE — 99213 OFFICE O/P EST LOW 20 MIN: CPT | Mod: S$PBB,,, | Performed by: NURSE PRACTITIONER

## 2024-12-23 PROCEDURE — 1160F RVW MEDS BY RX/DR IN RCRD: CPT | Mod: CPTII,,, | Performed by: NURSE PRACTITIONER

## 2024-12-23 PROCEDURE — 3079F DIAST BP 80-89 MM HG: CPT | Mod: CPTII,,, | Performed by: NURSE PRACTITIONER

## 2024-12-23 PROCEDURE — 99214 OFFICE O/P EST MOD 30 MIN: CPT | Mod: PBBFAC | Performed by: NURSE PRACTITIONER

## 2024-12-23 PROCEDURE — 3066F NEPHROPATHY DOC TX: CPT | Mod: CPTII,,, | Performed by: NURSE PRACTITIONER

## 2024-12-23 PROCEDURE — 82570 ASSAY OF URINE CREATININE: CPT | Performed by: NURSE PRACTITIONER

## 2024-12-23 PROCEDURE — 3008F BODY MASS INDEX DOCD: CPT | Mod: CPTII,,, | Performed by: NURSE PRACTITIONER

## 2024-12-23 PROCEDURE — 3075F SYST BP GE 130 - 139MM HG: CPT | Mod: CPTII,,, | Performed by: NURSE PRACTITIONER

## 2024-12-23 NOTE — ASSESSMENT & PLAN NOTE
Blood pressure controlled, 131/80.  Patient currently compliance with losartan 100 mg p.o. once daily, hydralazine 50 mg p.o. b.i.d., Coreg 3.125 mg p.o. b.i.d., amlodipine 10 mg p.o. once daily.  Continue to follow low-salt diet less than 2 g.  Stay hydrated with water.  Keep appointment with Nephrology and Cardiology.

## 2024-12-23 NOTE — ASSESSMENT & PLAN NOTE
BUN and creatinine was completed a month ago, BUN 36, creatinine 2.16.  eGFR 27  BUN and creatinine ratio 17.  Microalbumin pending.  Keep appointments with Nephrology and take medications as prescribed.  Questions solicited and answered, patient verbalized understanding.

## 2024-12-23 NOTE — PROGRESS NOTES
"Patient Name: Fe James   : 1970  MRN: [id number]     SUBJECTIVE DATA:    CHIEF COMPLAINT:   Fe James is a 54 y.o. female who presents to clinic today with Hypertension and Follow-up        HPI:  54-year-old female presents to the clinic with past medical history of chronic kidney disease, hypertension, hyperuricemia, and obesity. Presents for follow-up appointment, denies complaints.     Care gaps:  Patient agreed to complete annual urine creatinine and referral to complete mammogram.          ALLERGIES: Review of patient's allergies indicates:  No Known Allergies      ROS:  Review of Systems   All other systems reviewed and are negative.        OBJECTIVE DATA:  Vital signs  Vitals:    24 0924   BP: 131/80   Pulse: 65   Resp: 18   Temp: 97.1 °F (36.2 °C)   TempSrc: Oral   SpO2: 98%   Weight: 96.2 kg (212 lb)   Height: 5' 8" (1.727 m)      Body mass index is 32.23 kg/m².    PHYSICAL EXAM:   Physical Exam  Vitals and nursing note reviewed.   Constitutional:       General: She is awake. She is not in acute distress.     Appearance: Normal appearance. She is well-developed and well-groomed. She is obese. She is not ill-appearing, toxic-appearing or diaphoretic.   HENT:      Head: Normocephalic and atraumatic.      Right Ear: External ear normal.      Left Ear: External ear normal.      Nose: Nose normal.      Mouth/Throat:      Lips: Pink.      Mouth: Mucous membranes are moist.      Tongue: Tongue does not deviate from midline.      Pharynx: Oropharynx is clear.   Eyes:      General: Lids are normal. Gaze aligned appropriately.      Extraocular Movements: Extraocular movements intact.      Pupils: Pupils are equal, round, and reactive to light.   Neck:      Trachea: Trachea and phonation normal.   Cardiovascular:      Rate and Rhythm: Normal rate and regular rhythm.      Pulses: Normal pulses.           Radial pulses are 2+ on the right side and 2+ on the left side.      Heart sounds: Normal heart " sounds.   Pulmonary:      Effort: Pulmonary effort is normal.      Breath sounds: Normal breath sounds and air entry.   Abdominal:      General: Abdomen is flat.   Genitourinary:     Comments: Denies any urinary symptoms  Musculoskeletal:         General: Normal range of motion.      Cervical back: Normal range of motion and neck supple.   Lymphadenopathy:      Cervical: No cervical adenopathy.   Skin:     General: Skin is warm and dry.      Capillary Refill: Capillary refill takes less than 2 seconds.   Neurological:      General: No focal deficit present.      Mental Status: She is alert and oriented to person, place, and time. Mental status is at baseline.      GCS: GCS eye subscore is 4. GCS verbal subscore is 5. GCS motor subscore is 6.      Cranial Nerves: Cranial nerves 2-12 are intact.   Psychiatric:         Attention and Perception: Attention and perception normal.         Mood and Affect: Mood and affect normal.         Speech: Speech normal.         Behavior: Behavior normal. Behavior is cooperative.         Thought Content: Thought content normal.         Cognition and Memory: Cognition and memory normal.         Judgment: Judgment normal.          ASSESSMENT/PLAN:  1. Primary hypertension  Assessment & Plan:  Blood pressure controlled, 131/80.  Patient currently compliance with losartan 100 mg p.o. once daily, hydralazine 50 mg p.o. b.i.d., Coreg 3.125 mg p.o. b.i.d., amlodipine 10 mg p.o. once daily.  Continue to follow low-salt diet less than 2 g.  Stay hydrated with water.  Keep appointment with Nephrology and Cardiology.      2. CKD (chronic kidney disease) stage 4, GFR 15-29 ml/min  Assessment & Plan:  BUN and creatinine was completed a month ago, BUN 36, creatinine 2.16.  eGFR 27  BUN and creatinine ratio 17.  Microalbumin pending.  Keep appointments with Nephrology and take medications as prescribed.  Questions solicited and answered, patient verbalized understanding.    Orders:  -      Microalbumin/Creatinine Ratio, Urine    3. Encounter for screening mammogram for breast cancer  -     Mammo Digital Screening Bilat w/ Baldomero; Future; Expected date: 12/23/2024           RESULTS:  Recent Results (from the past 6 weeks)   Comprehensive Metabolic Panel    Collection Time: 11/19/24 10:01 AM   Result Value Ref Range    Sodium 143 136 - 145 mmol/L    Potassium 4.1 3.5 - 5.1 mmol/L    Chloride 112 (H) 98 - 107 mmol/L    CO2 24 22 - 29 mmol/L    Glucose 81 74 - 100 mg/dL    Blood Urea Nitrogen 36.0 (H) 9.8 - 20.1 mg/dL    Creatinine 2.12 (H) 0.55 - 1.02 mg/dL    Calcium 8.9 8.4 - 10.2 mg/dL    Protein Total 6.5 6.4 - 8.3 gm/dL    Albumin 3.6 3.5 - 5.0 g/dL    Globulin 2.9 2.4 - 3.5 gm/dL    Albumin/Globulin Ratio 1.2 1.1 - 2.0 ratio    Bilirubin Total 0.4 <=1.5 mg/dL    ALP 86 40 - 150 unit/L    ALT 10 0 - 55 unit/L    AST 12 5 - 34 unit/L    eGFR 27 mL/min/1.73/m2    Anion Gap 7.0 mEq/L    BUN/Creatinine Ratio 17    PTH, Intact    Collection Time: 11/19/24 10:01 AM   Result Value Ref Range    PARATHYROID HORMONE INTACT 214.8 (H) 8.7 - 77.0 pg/mL         Follow Up:  Follow up in about 4 months (around 4/10/2025).     25 minutes of total time spent on the encounter, which includes face to face time and non-face to face time preparing to see the patient (eg, review of tests), Obtaining and/or reviewing separately obtained history, Documenting clinical information in the electronic or other health record, Independently interpreting results (not separately reported) and communicating results to the patient/family/caregiver, or Care coordination (not separately reported).      Previous medical history/lab work/radiology reviewed and considered during medical management decisions.   Medication list reviewed and medication reconciliation performed.  Patient was provided  and care about his/her current diagnosis (es) and medications including risk/benefit and side effects/adverse events, over the counter  medication uses/doses, home self-care and contact precautions,  and red flags and indications for when to seek immediate medical attention.   Patient was advised to continue compliance with current medication list and medical recommendations.  Patient dvised continued compliance with recommended eating habits/ diets for medical conditions and exercise 150 minutes/ week (if possible) for medical condition (s).  Educational handouts and instructions on selected disease management in AVS (After Visit Summary).    All of the patient's questions were answered to patient's satisfaction.   The patient was receptive, expressed verbal understanding and agreement the above plan.      This note was created with the assistance of a voice recognition software or phone dictation. There may be transcription errors as a result of using this technology however minimal. Effort has been made to assure accuracy of transcription but any obvious errors or omissions should be clarified with the author of the document

## 2024-12-30 ENCOUNTER — TELEPHONE (OUTPATIENT)
Dept: FAMILY MEDICINE | Facility: CLINIC | Age: 54
End: 2024-12-30
Payer: COMMERCIAL

## 2024-12-30 NOTE — TELEPHONE ENCOUNTER
Called patient to give results. Patient verbalized understanding. No additional questions at this time.     ----- Message from JOY Butler sent at 12/30/2024  9:20 AM CST -----  PLEASE CALL PATIENTS WITH RESULTS,   Microalbumin to creatinine ratio elevated.  Stay hydrated with water.  Stay physically active.  Keep appointment with Nephrology as directed and take medications as prescribed.

## 2025-05-19 ENCOUNTER — PATIENT MESSAGE (OUTPATIENT)
Dept: NEPHROLOGY | Facility: CLINIC | Age: 55
End: 2025-05-19
Payer: COMMERCIAL

## 2025-05-20 ENCOUNTER — LAB VISIT (OUTPATIENT)
Dept: LAB | Facility: HOSPITAL | Age: 55
End: 2025-05-20
Attending: NURSE PRACTITIONER
Payer: COMMERCIAL

## 2025-05-20 ENCOUNTER — OFFICE VISIT (OUTPATIENT)
Dept: NEPHROLOGY | Facility: CLINIC | Age: 55
End: 2025-05-20
Payer: COMMERCIAL

## 2025-05-20 VITALS
HEIGHT: 68 IN | DIASTOLIC BLOOD PRESSURE: 83 MMHG | BODY MASS INDEX: 31.37 KG/M2 | OXYGEN SATURATION: 98 % | HEART RATE: 78 BPM | WEIGHT: 207 LBS | RESPIRATION RATE: 20 BRPM | SYSTOLIC BLOOD PRESSURE: 132 MMHG | TEMPERATURE: 98 F

## 2025-05-20 DIAGNOSIS — N18.4 CKD STAGE G4/A3, GFR 15-29 AND ALBUMIN CREATININE RATIO >300 MG/G: Primary | ICD-10-CM

## 2025-05-20 DIAGNOSIS — N25.81 SECONDARY HYPERPARATHYROIDISM OF RENAL ORIGIN: ICD-10-CM

## 2025-05-20 DIAGNOSIS — I10 PRIMARY HYPERTENSION: ICD-10-CM

## 2025-05-20 DIAGNOSIS — N18.4 CKD STAGE G4/A3, GFR 15-29 AND ALBUMIN CREATININE RATIO >300 MG/G: ICD-10-CM

## 2025-05-20 LAB
ALBUMIN SERPL-MCNC: 3.7 G/DL (ref 3.5–5)
ALBUMIN/GLOB SERPL: 1.1 RATIO (ref 1.1–2)
ALP SERPL-CCNC: 93 UNIT/L (ref 40–150)
ALT SERPL-CCNC: 12 UNIT/L (ref 0–55)
ANION GAP SERPL CALC-SCNC: 5 MEQ/L
AST SERPL-CCNC: 15 UNIT/L (ref 11–45)
BACTERIA #/AREA URNS AUTO: ABNORMAL /HPF
BASOPHILS # BLD AUTO: 0.05 X10(3)/MCL
BASOPHILS NFR BLD AUTO: 1.2 %
BILIRUB SERPL-MCNC: 0.6 MG/DL
BILIRUB UR QL STRIP.AUTO: NEGATIVE
BUN SERPL-MCNC: 41.2 MG/DL (ref 9.8–20.1)
CALCIUM SERPL-MCNC: 8.9 MG/DL (ref 8.4–10.2)
CHLORIDE SERPL-SCNC: 112 MMOL/L (ref 98–107)
CLARITY UR: CLEAR
CO2 SERPL-SCNC: 24 MMOL/L (ref 22–29)
COLOR UR AUTO: COLORLESS
CREAT SERPL-MCNC: 2.33 MG/DL (ref 0.55–1.02)
CREAT UR-MCNC: 74.5 MG/DL (ref 45–106)
CREAT/UREA NIT SERPL: 18
EOSINOPHIL # BLD AUTO: 0.17 X10(3)/MCL (ref 0–0.9)
EOSINOPHIL NFR BLD AUTO: 4 %
ERYTHROCYTE [DISTWIDTH] IN BLOOD BY AUTOMATED COUNT: 12.9 % (ref 11.5–17)
GFR SERPLBLD CREATININE-BSD FMLA CKD-EPI: 24 ML/MIN/1.73/M2
GLOBULIN SER-MCNC: 3.4 GM/DL (ref 2.4–3.5)
GLUCOSE SERPL-MCNC: 88 MG/DL (ref 74–100)
GLUCOSE UR QL STRIP: NORMAL
HCT VFR BLD AUTO: 40.9 % (ref 37–47)
HGB BLD-MCNC: 13.1 G/DL (ref 12–16)
HGB UR QL STRIP: NEGATIVE
HYALINE CASTS #/AREA URNS LPF: ABNORMAL /LPF
IMM GRANULOCYTES # BLD AUTO: 0.01 X10(3)/MCL (ref 0–0.04)
IMM GRANULOCYTES NFR BLD AUTO: 0.2 %
KETONES UR QL STRIP: NEGATIVE
LEUKOCYTE ESTERASE UR QL STRIP: 25
LYMPHOCYTES # BLD AUTO: 1.08 X10(3)/MCL (ref 0.6–4.6)
LYMPHOCYTES NFR BLD AUTO: 25.2 %
MCH RBC QN AUTO: 28.1 PG (ref 27–31)
MCHC RBC AUTO-ENTMCNC: 32 G/DL (ref 33–36)
MCV RBC AUTO: 87.6 FL (ref 80–94)
MONOCYTES # BLD AUTO: 0.4 X10(3)/MCL (ref 0.1–1.3)
MONOCYTES NFR BLD AUTO: 9.3 %
NEUTROPHILS # BLD AUTO: 2.58 X10(3)/MCL (ref 2.1–9.2)
NEUTROPHILS NFR BLD AUTO: 60.1 %
NITRITE UR QL STRIP: NEGATIVE
NRBC BLD AUTO-RTO: 0 %
PH UR STRIP: 5.5 [PH]
PHOSPHATE SERPL-MCNC: 3.3 MG/DL (ref 2.3–4.7)
PLATELET # BLD AUTO: 255 X10(3)/MCL (ref 130–400)
PMV BLD AUTO: 10.4 FL (ref 7.4–10.4)
POTASSIUM SERPL-SCNC: 4.2 MMOL/L (ref 3.5–5.1)
PROT SERPL-MCNC: 7.1 GM/DL (ref 6.4–8.3)
PROT UR QL STRIP: ABNORMAL
PROT UR STRIP-MCNC: 50.1 MG/DL
PTH-INTACT SERPL-MCNC: 188.2 PG/ML (ref 8.7–77)
RBC # BLD AUTO: 4.67 X10(6)/MCL (ref 4.2–5.4)
RBC #/AREA URNS AUTO: ABNORMAL /HPF
SODIUM SERPL-SCNC: 141 MMOL/L (ref 136–145)
SP GR UR STRIP.AUTO: 1.01 (ref 1–1.03)
SQUAMOUS #/AREA URNS LPF: ABNORMAL /HPF
URINE PROTEIN/CREATININE RATIO (OLG): 0.7
UROBILINOGEN UR STRIP-ACNC: NORMAL
WBC # BLD AUTO: 4.29 X10(3)/MCL (ref 4.5–11.5)
WBC #/AREA URNS AUTO: ABNORMAL /HPF

## 2025-05-20 PROCEDURE — 84100 ASSAY OF PHOSPHORUS: CPT

## 2025-05-20 PROCEDURE — 1159F MED LIST DOCD IN RCRD: CPT | Mod: CPTII,,, | Performed by: NURSE PRACTITIONER

## 2025-05-20 PROCEDURE — 36415 COLL VENOUS BLD VENIPUNCTURE: CPT

## 2025-05-20 PROCEDURE — 3008F BODY MASS INDEX DOCD: CPT | Mod: CPTII,,, | Performed by: NURSE PRACTITIONER

## 2025-05-20 PROCEDURE — 82570 ASSAY OF URINE CREATININE: CPT

## 2025-05-20 PROCEDURE — 80053 COMPREHEN METABOLIC PANEL: CPT

## 2025-05-20 PROCEDURE — 99214 OFFICE O/P EST MOD 30 MIN: CPT | Mod: S$PBB,,, | Performed by: NURSE PRACTITIONER

## 2025-05-20 PROCEDURE — 3079F DIAST BP 80-89 MM HG: CPT | Mod: CPTII,,, | Performed by: NURSE PRACTITIONER

## 2025-05-20 PROCEDURE — 85025 COMPLETE CBC W/AUTO DIFF WBC: CPT

## 2025-05-20 PROCEDURE — 99214 OFFICE O/P EST MOD 30 MIN: CPT | Mod: PBBFAC | Performed by: NURSE PRACTITIONER

## 2025-05-20 PROCEDURE — 4010F ACE/ARB THERAPY RXD/TAKEN: CPT | Mod: CPTII,,, | Performed by: NURSE PRACTITIONER

## 2025-05-20 PROCEDURE — 3075F SYST BP GE 130 - 139MM HG: CPT | Mod: CPTII,,, | Performed by: NURSE PRACTITIONER

## 2025-05-20 PROCEDURE — 83970 ASSAY OF PARATHORMONE: CPT

## 2025-05-20 PROCEDURE — 81015 MICROSCOPIC EXAM OF URINE: CPT

## 2025-05-20 PROCEDURE — 3066F NEPHROPATHY DOC TX: CPT | Mod: CPTII,,, | Performed by: NURSE PRACTITIONER

## 2025-05-20 NOTE — PROGRESS NOTES
" [facility]  Nephrology Clinic    Chief complaint: Chronic Kidney Disease (Follow up)    History of present illness:   Fe James is a 55 y.o. Black or  female with past medical history of chronic kidney disease, hypertension, hyperuricemia, and obesity. Presents for follow-up appointment, denies complaints.    Review of Systems  12 point review of systems conducted, negative except as stated in the history of present illness.    Allergies: Patient has no known allergies.     Past Medical History:  has a past medical history of CKD (chronic kidney disease) stage 3, GFR 30-59 ml/min, Gout, unspecified, HTN (hypertension), and Murmur, cardiac.    Procedure History:  has a past surgical history that includes Bilateral tubal ligation; Tonsillectomy; and Tubal ligation.    Family History: family history includes Aneurysm in her sister; Down syndrome in her brother; Hypertension in her sister; Kidney disease in her brother and mother; No Known Problems in her brother; Stroke in her father and mother.    Social History:  reports that she has never smoked. She has never used smokeless tobacco. She reports that she does not currently use alcohol. She reports that she does not use drugs.    Physical exam  /83 (BP Location: Left arm, Patient Position: Sitting)   Pulse 78   Temp 98.1 °F (36.7 °C) (Oral)   Resp 20   Ht 5' 8" (1.727 m)   Wt 93.9 kg (207 lb 0.2 oz)   SpO2 98%   BMI 31.48 kg/m²   General appearance: Patient is in no acute distress.  Skin: No rashes or wounds.  HEENT: PERRLA, EOMI, no scleral icterus, no JVD. Neck is supple.  Chest: Respirations are unlabored. Lungs sounds are clear.   Heart: S1, S2.   Abdomen: Benign.  : Deferred.  Extremities: No edema, peripheral pulses are palpable.   Neuro: No focal deficits.     Home Medications:  Current Medications[1]    Laboratory data    Lab Results   Component Value Date    WBC 4.29 (L) 05/20/2025    HGB 13.1 " 05/20/2025    HCT 40.9 05/20/2025     05/20/2025     05/20/2025    K 4.2 05/20/2025    CO2 24 05/20/2025    BUN 41.2 (H) 05/20/2025    CREATININE 2.33 (H) 05/20/2025    EGFRNORACEVR 24 05/20/2025    CALCIUM 8.9 05/20/2025    ALKPHOS 93 05/20/2025    ALBUMIN 3.7 05/20/2025    BILIDIR 0.1 06/16/2021    IBILI 0.20 06/16/2021    AST 15 05/20/2025    ALT 12 05/20/2025    MG 2.35 12/28/2020    PHOS 3.3 05/20/2025      Lab Results   Component Value Date    HGBA1C 5.2 10/05/2022    .2 (H) 05/20/2025    [id number] 54.1 12/28/2020    ANAHS <1:80 (Negative) 09/05/2023    CANCA Negative 09/05/2023    PANCA Negative 09/05/2023    HIV Nonreactive 09/05/2023    HEPCAB Nonreactive 09/05/2023    MSPIKEPCT  09/05/2023      Comment:      None observed.     Urine:  Lab Results   Component Value Date    APPEARANCEUA Clear 05/20/2025    SGUA 1.010 05/20/2025    PROTEINUA 1+ (A) 05/20/2025    KETONESUA Negative 05/20/2025    LEUKOCYTESUR 25 (A) 05/20/2025    RBCUA 0-5 05/20/2025    WBCUA 0-5 05/20/2025    BACTERIA Trace (A) 05/20/2025    SQEPUA Occasional (A) 05/20/2025    HYALINECASTS None Seen 05/20/2025    CREATRANDUR 74.5 05/20/2025    PROTEINURINE 50.1 05/20/2025    UPROTCREA 0.7 05/20/2025         Imaging  US Retroperitoneal Limited 03/03/2020     Grayscale and color Doppler images of the kidneys were obtained. The  right kidney measures 9 cm and the left kidney measures 9 cm in  length. There is heterogeneous renal parenchymal echogenicity with  loss of corticomedullary differentiation. There is no hydronephrosis.  There is a 2 cm cyst of the left kidney.    Impression  Findings suggestive of medical renal disease.    Impression    ICD-10-CM ICD-9-CM   1. CKD stage G4/A3, GFR 15-29 and albumin creatinine ratio >300 mg/g  N18.4 585.4   2. Primary hypertension  I10 401.9   3. Secondary hyperparathyroidism of renal origin  N25.81 588.81   4. BMI 32.0-32.9,adult  Z68.32 V85.32        Plan  CKD stage G4/A3, GFR  15-29 and albumin creatinine ratio >300 mg/g  Proteinuria evaluation revealed negative OREN, negative ANCA, SPEP negative for M spike, negative hepatitis panel, negative HIV.  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  There are no indications for immunosuppression.  Continue risk factor management, ILAN blockade, and treatment of complications advanced CKD.      Primary hypertension  Blood pressure reading is at goal, continue current antihypertensive regimen and 2 g a day dietary sodium restriction.      Secondary hyperparathyroidism of renal origin  Continue calcitriol at current dose.  Will check intact PTH and phosphorus level prior to next appointment.      BMI 32.0-32.9,adult  Lifestyle and dietary interventions discussed, patient encouraged to maintain non-sedentary lifestyle and well-balanced diet.       Follow up in about 4 months (around 9/20/2025).     Orders Placed This Encounter   Procedures    Comprehensive Metabolic Panel     Standing Status:   Future     Expected Date:   9/10/2025     Expiration Date:   10/10/2025    Microalbumin/Creatinine Ratio, Urine     Standing Status:   Future     Expected Date:   9/10/2025     Expiration Date:   10/10/2025     Specimen Source:   Urine     Send normal result to authorizing provider's In Basket if patient is active on MyChart::   Yes    Urinalysis, Reflex to Urine Culture     Standing Status:   Future     Expected Date:   9/10/2025     Expiration Date:   10/10/2025     Specimen Source:   Urine    PTH, Intact     Standing Status:   Future     Expected Date:   9/10/2025     Expiration Date:   10/10/2025        Jazmin Huggins NP  [facility]phrology            [1]   Current Outpatient Medications:     allopurinoL (ZYLOPRIM) 100 MG tablet, Take 1 tablet (100 mg total) by mouth once daily., Disp: 90 tablet, Rfl: 1    amLODIPine (NORVASC) 10 MG  tablet, Take 1 tablet (10 mg total) by mouth once daily., Disp: 90 tablet, Rfl: 1    calcitRIOL (ROCALTROL) 0.25 MCG Cap, Take 1 capsule (0.25 mcg total) by mouth every other day., Disp: 45 capsule, Rfl: 1    carvediloL (COREG) 3.125 MG tablet, Take 1 tablet (3.125 mg total) by mouth 2 (two) times daily with meals., Disp: 180 tablet, Rfl: 3    hydrALAZINE (APRESOLINE) 50 MG tablet, Take 1 tablet (50 mg total) by mouth 2 (two) times a day., Disp: 180 tablet, Rfl: 3    losartan (COZAAR) 100 MG tablet, Take 1 tablet (100 mg total) by mouth once daily., Disp: 90 tablet, Rfl: 3

## 2025-06-12 DIAGNOSIS — N25.81 SECONDARY HYPERPARATHYROIDISM OF RENAL ORIGIN: ICD-10-CM

## 2025-06-12 DIAGNOSIS — E79.0 ELEVATED URIC ACID IN BLOOD: ICD-10-CM

## 2025-06-12 RX ORDER — ALLOPURINOL 100 MG/1
100 TABLET ORAL
Qty: 30 TABLET | Refills: 5 | Status: SHIPPED | OUTPATIENT
Start: 2025-06-12

## 2025-06-12 RX ORDER — CALCITRIOL 0.25 UG/1
0.25 CAPSULE ORAL EVERY OTHER DAY
Qty: 45 CAPSULE | Refills: 1 | Status: SHIPPED | OUTPATIENT
Start: 2025-06-12

## 2025-07-30 DIAGNOSIS — I10 PRIMARY HYPERTENSION: ICD-10-CM

## 2025-07-30 RX ORDER — AMLODIPINE BESYLATE 10 MG/1
10 TABLET ORAL
Qty: 30 TABLET | Refills: 5 | Status: SHIPPED | OUTPATIENT
Start: 2025-07-30